# Patient Record
Sex: FEMALE | Race: ASIAN | NOT HISPANIC OR LATINO | URBAN - METROPOLITAN AREA
[De-identification: names, ages, dates, MRNs, and addresses within clinical notes are randomized per-mention and may not be internally consistent; named-entity substitution may affect disease eponyms.]

---

## 2021-05-13 PROBLEM — Z00.00 ENCOUNTER FOR PREVENTIVE HEALTH EXAMINATION: Status: ACTIVE | Noted: 2021-05-13

## 2022-12-24 ENCOUNTER — INPATIENT (INPATIENT)
Facility: HOSPITAL | Age: 87
LOS: 11 days | Discharge: NURSING, REHAB OR LONG-TERM CARE | DRG: 853 | End: 2023-01-05
Attending: INTERNAL MEDICINE | Admitting: INTERNAL MEDICINE
Payer: MEDICARE

## 2022-12-24 VITALS
WEIGHT: 89.95 LBS | HEART RATE: 123 BPM | SYSTOLIC BLOOD PRESSURE: 144 MMHG | OXYGEN SATURATION: 93 % | TEMPERATURE: 98 F | DIASTOLIC BLOOD PRESSURE: 90 MMHG | RESPIRATION RATE: 18 BRPM

## 2022-12-24 DIAGNOSIS — I80.10 PHLEBITIS AND THROMBOPHLEBITIS OF UNSPECIFIED FEMORAL VEIN: ICD-10-CM

## 2022-12-24 DIAGNOSIS — Z90.49 ACQUIRED ABSENCE OF OTHER SPECIFIED PARTS OF DIGESTIVE TRACT: Chronic | ICD-10-CM

## 2022-12-24 DIAGNOSIS — I26.99 OTHER PULMONARY EMBOLISM WITHOUT ACUTE COR PULMONALE: ICD-10-CM

## 2022-12-24 LAB
ALBUMIN SERPL ELPH-MCNC: 1.2 G/DL — LOW (ref 3.3–5)
ALBUMIN SERPL ELPH-MCNC: 1.5 G/DL — LOW (ref 3.3–5)
ALP SERPL-CCNC: 137 U/L — HIGH (ref 40–120)
ALP SERPL-CCNC: 149 U/L — HIGH (ref 40–120)
ALT FLD-CCNC: 55 U/L — HIGH (ref 10–45)
ALT FLD-CCNC: 59 U/L — HIGH (ref 10–45)
ANION GAP SERPL CALC-SCNC: 11 MMOL/L — SIGNIFICANT CHANGE UP (ref 5–17)
ANION GAP SERPL CALC-SCNC: 7 MMOL/L — SIGNIFICANT CHANGE UP (ref 5–17)
APPEARANCE UR: CLEAR — SIGNIFICANT CHANGE UP
APTT BLD: 30.2 SEC — SIGNIFICANT CHANGE UP (ref 27.5–35.5)
APTT BLD: >200 SEC — CRITICAL HIGH (ref 27.5–35.5)
AST SERPL-CCNC: 63 U/L — HIGH (ref 10–40)
AST SERPL-CCNC: 71 U/L — HIGH (ref 10–40)
BACTERIA # UR AUTO: NEGATIVE /HPF — SIGNIFICANT CHANGE UP
BASE EXCESS BLDA CALC-SCNC: -2.7 MMOL/L — LOW (ref -2–3)
BASOPHILS # BLD AUTO: 0.05 K/UL — SIGNIFICANT CHANGE UP (ref 0–0.2)
BASOPHILS NFR BLD AUTO: 0.4 % — SIGNIFICANT CHANGE UP (ref 0–2)
BILIRUB SERPL-MCNC: 0.6 MG/DL — SIGNIFICANT CHANGE UP (ref 0.2–1.2)
BILIRUB SERPL-MCNC: 0.7 MG/DL — SIGNIFICANT CHANGE UP (ref 0.2–1.2)
BILIRUB UR-MCNC: ABNORMAL
BUN SERPL-MCNC: 25 MG/DL — HIGH (ref 7–23)
BUN SERPL-MCNC: 27 MG/DL — HIGH (ref 7–23)
CALCIUM SERPL-MCNC: 7.5 MG/DL — LOW (ref 8.4–10.5)
CALCIUM SERPL-MCNC: 8.2 MG/DL — LOW (ref 8.4–10.5)
CHLORIDE SERPL-SCNC: 103 MMOL/L — SIGNIFICANT CHANGE UP (ref 96–108)
CHLORIDE SERPL-SCNC: 105 MMOL/L — SIGNIFICANT CHANGE UP (ref 96–108)
CK MB BLD-MCNC: 2.4 % — SIGNIFICANT CHANGE UP (ref 0–3.5)
CK MB CFR SERPL CALC: 5.3 NG/ML — SIGNIFICANT CHANGE UP (ref 0.5–10)
CK SERPL-CCNC: 218 U/L — HIGH (ref 25–170)
CO2 BLDA-SCNC: 21 MMOL/L — SIGNIFICANT CHANGE UP (ref 19–24)
CO2 SERPL-SCNC: 23 MMOL/L — SIGNIFICANT CHANGE UP (ref 22–31)
CO2 SERPL-SCNC: 24 MMOL/L — SIGNIFICANT CHANGE UP (ref 22–31)
COLOR SPEC: YELLOW — SIGNIFICANT CHANGE UP
CREAT SERPL-MCNC: 0.76 MG/DL — SIGNIFICANT CHANGE UP (ref 0.5–1.3)
CREAT SERPL-MCNC: 1.03 MG/DL — SIGNIFICANT CHANGE UP (ref 0.5–1.3)
DIFF PNL FLD: ABNORMAL
EGFR: 53 ML/MIN/1.73M2 — LOW
EGFR: 76 ML/MIN/1.73M2 — SIGNIFICANT CHANGE UP
EOSINOPHIL # BLD AUTO: 0.02 K/UL — SIGNIFICANT CHANGE UP (ref 0–0.5)
EOSINOPHIL NFR BLD AUTO: 0.2 % — SIGNIFICANT CHANGE UP (ref 0–6)
EPI CELLS # UR: SIGNIFICANT CHANGE UP
GAS PNL BLDA: SIGNIFICANT CHANGE UP
GLUCOSE SERPL-MCNC: 216 MG/DL — HIGH (ref 70–99)
GLUCOSE SERPL-MCNC: 81 MG/DL — SIGNIFICANT CHANGE UP (ref 70–99)
GLUCOSE UR QL: NEGATIVE — SIGNIFICANT CHANGE UP
HCO3 BLDA-SCNC: 21 MMOL/L — SIGNIFICANT CHANGE UP (ref 21–28)
HCT VFR BLD CALC: 28.4 % — LOW (ref 34.5–45)
HCT VFR BLD CALC: 36.8 % — SIGNIFICANT CHANGE UP (ref 34.5–45)
HGB BLD-MCNC: 11.6 G/DL — SIGNIFICANT CHANGE UP (ref 11.5–15.5)
HGB BLD-MCNC: 9.3 G/DL — LOW (ref 11.5–15.5)
HOROWITZ INDEX BLDA+IHG-RTO: 100 — SIGNIFICANT CHANGE UP
IMM GRANULOCYTES NFR BLD AUTO: 0.9 % — SIGNIFICANT CHANGE UP (ref 0–0.9)
INR BLD: 1.34 RATIO — HIGH (ref 0.88–1.16)
INR BLD: 1.85 RATIO — HIGH (ref 0.88–1.16)
KETONES UR-MCNC: NEGATIVE — SIGNIFICANT CHANGE UP
LACTATE SERPL-SCNC: 2.3 MMOL/L — HIGH (ref 0.7–2)
LACTATE SERPL-SCNC: 4.2 MMOL/L — CRITICAL HIGH (ref 0.7–2)
LACTATE SERPL-SCNC: 7.5 MMOL/L — CRITICAL HIGH (ref 0.7–2)
LEUKOCYTE ESTERASE UR-ACNC: ABNORMAL
LYMPHOCYTES # BLD AUTO: 1.29 K/UL — SIGNIFICANT CHANGE UP (ref 1–3.3)
LYMPHOCYTES # BLD AUTO: 10.4 % — LOW (ref 13–44)
MCHC RBC-ENTMCNC: 28.1 PG — SIGNIFICANT CHANGE UP (ref 27–34)
MCHC RBC-ENTMCNC: 28.4 PG — SIGNIFICANT CHANGE UP (ref 27–34)
MCHC RBC-ENTMCNC: 31.5 GM/DL — LOW (ref 32–36)
MCHC RBC-ENTMCNC: 32.7 GM/DL — SIGNIFICANT CHANGE UP (ref 32–36)
MCV RBC AUTO: 86.6 FL — SIGNIFICANT CHANGE UP (ref 80–100)
MCV RBC AUTO: 89.1 FL — SIGNIFICANT CHANGE UP (ref 80–100)
MONOCYTES # BLD AUTO: 0.37 K/UL — SIGNIFICANT CHANGE UP (ref 0–0.9)
MONOCYTES NFR BLD AUTO: 3 % — SIGNIFICANT CHANGE UP (ref 2–14)
NEUTROPHILS # BLD AUTO: 10.6 K/UL — HIGH (ref 1.8–7.4)
NEUTROPHILS NFR BLD AUTO: 85.1 % — HIGH (ref 43–77)
NITRITE UR-MCNC: NEGATIVE — SIGNIFICANT CHANGE UP
NRBC # BLD: 0 /100 WBCS — SIGNIFICANT CHANGE UP (ref 0–0)
NRBC # BLD: 0 /100 WBCS — SIGNIFICANT CHANGE UP (ref 0–0)
NT-PROBNP SERPL-SCNC: 8237 PG/ML — HIGH (ref 0–300)
PCO2 BLDA: 27 MMHG — LOW (ref 32–35)
PH BLDA: 7.49 — HIGH (ref 7.35–7.45)
PH UR: 6 — SIGNIFICANT CHANGE UP (ref 5–8)
PLATELET # BLD AUTO: 175 K/UL — SIGNIFICANT CHANGE UP (ref 150–400)
PLATELET # BLD AUTO: 241 K/UL — SIGNIFICANT CHANGE UP (ref 150–400)
PO2 BLDA: 289 MMHG — HIGH (ref 83–108)
POTASSIUM SERPL-MCNC: 4.9 MMOL/L — SIGNIFICANT CHANGE UP (ref 3.5–5.3)
POTASSIUM SERPL-MCNC: 5.5 MMOL/L — HIGH (ref 3.5–5.3)
POTASSIUM SERPL-SCNC: 4.9 MMOL/L — SIGNIFICANT CHANGE UP (ref 3.5–5.3)
POTASSIUM SERPL-SCNC: 5.5 MMOL/L — HIGH (ref 3.5–5.3)
PROT SERPL-MCNC: 4.6 G/DL — LOW (ref 6–8.3)
PROT SERPL-MCNC: 5.6 G/DL — LOW (ref 6–8.3)
PROT UR-MCNC: 30 MG/DL
PROTHROM AB SERPL-ACNC: 15.6 SEC — HIGH (ref 10.5–13.4)
PROTHROM AB SERPL-ACNC: 21.6 SEC — HIGH (ref 10.5–13.4)
RAPID RVP RESULT: SIGNIFICANT CHANGE UP
RBC # BLD: 3.28 M/UL — LOW (ref 3.8–5.2)
RBC # BLD: 4.13 M/UL — SIGNIFICANT CHANGE UP (ref 3.8–5.2)
RBC # FLD: 13.2 % — SIGNIFICANT CHANGE UP (ref 10.3–14.5)
RBC # FLD: 13.2 % — SIGNIFICANT CHANGE UP (ref 10.3–14.5)
RBC CASTS # UR COMP ASSIST: ABNORMAL /HPF (ref 0–4)
SAO2 % BLDA: 99.2 % — HIGH (ref 94–98)
SARS-COV-2 RNA SPEC QL NAA+PROBE: SIGNIFICANT CHANGE UP
SODIUM SERPL-SCNC: 136 MMOL/L — SIGNIFICANT CHANGE UP (ref 135–145)
SODIUM SERPL-SCNC: 137 MMOL/L — SIGNIFICANT CHANGE UP (ref 135–145)
SP GR SPEC: 1.01 — SIGNIFICANT CHANGE UP (ref 1.01–1.02)
TROPONIN I, HIGH SENSITIVITY RESULT: 140.4 NG/L — HIGH
TROPONIN I, HIGH SENSITIVITY RESULT: 170.9 NG/L — HIGH
UROBILINOGEN FLD QL: 4
WBC # BLD: 12.44 K/UL — HIGH (ref 3.8–10.5)
WBC # BLD: 15.43 K/UL — HIGH (ref 3.8–10.5)
WBC # FLD AUTO: 12.44 K/UL — HIGH (ref 3.8–10.5)
WBC # FLD AUTO: 15.43 K/UL — HIGH (ref 3.8–10.5)
WBC UR QL: SIGNIFICANT CHANGE UP /HPF (ref 0–5)

## 2022-12-24 PROCEDURE — 74177 CT ABD & PELVIS W/CONTRAST: CPT | Mod: 26,MG

## 2022-12-24 PROCEDURE — 93010 ELECTROCARDIOGRAM REPORT: CPT

## 2022-12-24 PROCEDURE — 99221 1ST HOSP IP/OBS SF/LOW 40: CPT

## 2022-12-24 PROCEDURE — 71045 X-RAY EXAM CHEST 1 VIEW: CPT | Mod: 26

## 2022-12-24 PROCEDURE — G1004: CPT

## 2022-12-24 PROCEDURE — 71260 CT THORAX DX C+: CPT | Mod: 26,MG

## 2022-12-24 PROCEDURE — 99291 CRITICAL CARE FIRST HOUR: CPT | Mod: FS

## 2022-12-24 RX ORDER — SODIUM CHLORIDE 9 MG/ML
1250 INJECTION INTRAMUSCULAR; INTRAVENOUS; SUBCUTANEOUS ONCE
Refills: 0 | Status: COMPLETED | OUTPATIENT
Start: 2022-12-24 | End: 2022-12-24

## 2022-12-24 RX ORDER — PIPERACILLIN AND TAZOBACTAM 4; .5 G/20ML; G/20ML
3.38 INJECTION, POWDER, LYOPHILIZED, FOR SOLUTION INTRAVENOUS ONCE
Refills: 0 | Status: COMPLETED | OUTPATIENT
Start: 2022-12-24 | End: 2022-12-24

## 2022-12-24 RX ORDER — SODIUM CHLORIDE 9 MG/ML
1000 INJECTION, SOLUTION INTRAVENOUS
Refills: 0 | Status: DISCONTINUED | OUTPATIENT
Start: 2022-12-24 | End: 2022-12-26

## 2022-12-24 RX ORDER — ACETAMINOPHEN 500 MG
650 TABLET ORAL EVERY 6 HOURS
Refills: 0 | Status: DISCONTINUED | OUTPATIENT
Start: 2022-12-24 | End: 2022-12-26

## 2022-12-24 RX ORDER — VANCOMYCIN HCL 1 G
1000 VIAL (EA) INTRAVENOUS ONCE
Refills: 0 | Status: COMPLETED | OUTPATIENT
Start: 2022-12-24 | End: 2022-12-24

## 2022-12-24 RX ORDER — VANCOMYCIN HCL 1 G
1000 VIAL (EA) INTRAVENOUS EVERY 12 HOURS
Refills: 0 | Status: DISCONTINUED | OUTPATIENT
Start: 2022-12-24 | End: 2022-12-25

## 2022-12-24 RX ORDER — MIDODRINE HYDROCHLORIDE 2.5 MG/1
5 TABLET ORAL EVERY 8 HOURS
Refills: 0 | Status: DISCONTINUED | OUTPATIENT
Start: 2022-12-24 | End: 2022-12-26

## 2022-12-24 RX ORDER — MIDODRINE HYDROCHLORIDE 2.5 MG/1
15 TABLET ORAL EVERY 8 HOURS
Refills: 0 | Status: DISCONTINUED | OUTPATIENT
Start: 2022-12-24 | End: 2022-12-24

## 2022-12-24 RX ORDER — HEPARIN SODIUM 5000 [USP'U]/ML
INJECTION INTRAVENOUS; SUBCUTANEOUS
Qty: 25000 | Refills: 0 | Status: DISCONTINUED | OUTPATIENT
Start: 2022-12-24 | End: 2022-12-25

## 2022-12-24 RX ORDER — PIPERACILLIN AND TAZOBACTAM 4; .5 G/20ML; G/20ML
3.38 INJECTION, POWDER, LYOPHILIZED, FOR SOLUTION INTRAVENOUS EVERY 8 HOURS
Refills: 0 | Status: DISCONTINUED | OUTPATIENT
Start: 2022-12-24 | End: 2022-12-26

## 2022-12-24 RX ORDER — PANTOPRAZOLE SODIUM 20 MG/1
40 TABLET, DELAYED RELEASE ORAL DAILY
Refills: 0 | Status: DISCONTINUED | OUTPATIENT
Start: 2022-12-24 | End: 2022-12-26

## 2022-12-24 RX ORDER — PIPERACILLIN AND TAZOBACTAM 4; .5 G/20ML; G/20ML
3.38 INJECTION, POWDER, LYOPHILIZED, FOR SOLUTION INTRAVENOUS ONCE
Refills: 0 | Status: DISCONTINUED | OUTPATIENT
Start: 2022-12-24 | End: 2022-12-24

## 2022-12-24 RX ORDER — HEPARIN SODIUM 5000 [USP'U]/ML
3500 INJECTION INTRAVENOUS; SUBCUTANEOUS ONCE
Refills: 0 | Status: COMPLETED | OUTPATIENT
Start: 2022-12-24 | End: 2022-12-24

## 2022-12-24 RX ADMIN — PIPERACILLIN AND TAZOBACTAM 200 GRAM(S): 4; .5 INJECTION, POWDER, LYOPHILIZED, FOR SOLUTION INTRAVENOUS at 16:47

## 2022-12-24 RX ADMIN — SODIUM CHLORIDE 100 MILLILITER(S): 9 INJECTION, SOLUTION INTRAVENOUS at 16:47

## 2022-12-24 RX ADMIN — Medication 0.5 MILLIGRAM(S): at 12:30

## 2022-12-24 RX ADMIN — Medication 250 MILLIGRAM(S): at 12:07

## 2022-12-24 RX ADMIN — HEPARIN SODIUM 800 UNIT(S)/HR: 5000 INJECTION INTRAVENOUS; SUBCUTANEOUS at 15:22

## 2022-12-24 RX ADMIN — SODIUM CHLORIDE 1250 MILLILITER(S): 9 INJECTION INTRAMUSCULAR; INTRAVENOUS; SUBCUTANEOUS at 12:40

## 2022-12-24 RX ADMIN — Medication 1000 MILLIGRAM(S): at 14:11

## 2022-12-24 RX ADMIN — SODIUM CHLORIDE 1250 MILLILITER(S): 9 INJECTION INTRAMUSCULAR; INTRAVENOUS; SUBCUTANEOUS at 14:15

## 2022-12-24 RX ADMIN — MIDODRINE HYDROCHLORIDE 5 MILLIGRAM(S): 2.5 TABLET ORAL at 22:03

## 2022-12-24 RX ADMIN — HEPARIN SODIUM 3500 UNIT(S): 5000 INJECTION INTRAVENOUS; SUBCUTANEOUS at 15:20

## 2022-12-24 RX ADMIN — PIPERACILLIN AND TAZOBACTAM 25 GRAM(S): 4; .5 INJECTION, POWDER, LYOPHILIZED, FOR SOLUTION INTRAVENOUS at 22:30

## 2022-12-24 RX ADMIN — Medication 100 MILLIGRAM(S): at 21:16

## 2022-12-24 RX ADMIN — HEPARIN SODIUM 700 UNIT(S)/HR: 5000 INJECTION INTRAVENOUS; SUBCUTANEOUS at 19:29

## 2022-12-24 RX ADMIN — HEPARIN SODIUM 0 UNIT(S)/HR: 5000 INJECTION INTRAVENOUS; SUBCUTANEOUS at 18:30

## 2022-12-24 NOTE — ED PROVIDER NOTE - OBJECTIVE STATEMENT
daughter at bedside translating for patient   87-year-old female brought in by EMS to the ED by her daughter for a decubitus ulcer.  Patient was also short of breath per EMS.  Per patient's daughter she had a small ulcer chronically which they were doing local wound care for, however she reports she has not seen the ulcer in 2 weeks, when she looked at it today she saw that it was large and very deep.  Patient is bedbound and does not ambulate.  No fever chills no chest pain no nausea vomiting. daughter at bedside translating for patient   87-year-old female brought in by EMS to the ED by her daughter for a decubitus ulcer.  Patient was also short of breath per EMS.  Per patient's daughter she had a small ulcer chronically which they were doing local wound care for, however she reports she has not seen the ulcer in 2 weeks, when she looked at it today she saw that it was large and very deep.  Patient is bedbound and does not ambulate.  No fever chills no chest pain no nausea vomiting. pt cannot provide history

## 2022-12-24 NOTE — ED PROVIDER NOTE - CLINICAL SUMMARY MEDICAL DECISION MAKING FREE TEXT BOX
87-year-old female brought in by EMS to the ED by her daughter for a decubitus ulcer.  Patient was also short of breath per EMS.  Per patient's daughter she had a small ulcer chronically which they were doing local wound care for, however she reports she has not seen the ulcer in 2 weeks, when she looked at it today she saw that it was large and very deep.  Patient is bedbound and does not ambulate.  No fever chills no chest pain no nausea vomiting. PE as noted above. ED sepsis w/u initiated. 87-year-old female brought in by EMS to the ED by her daughter for a decubitus ulcer.  Patient was also short of breath per EMS.  Per patient's daughter she had a small ulcer chronically which they were doing local wound care for, however she reports she has not seen the ulcer in 2 weeks, when she looked at it today she saw that it was large and very deep.  Patient is bedbound and does not ambulate.  No fever chills no chest pain no nausea vomiting. PE as noted above. ED sepsis w/u initiated.    4pm: lab abnormalities reviewed. CT results reviewed- b/l PE and right fem DVT.  Gluteal abscess noted and air droplets around the sacral decubitus. Surgery and ICU c/s. Pt accepted to [facility].  Heparin started. daughter at bedside translating for patient   87-year-old female brought in by EMS to the ED by her daughter for a decubitus ulcer.  Patient was also short of breath per EMS.  Per patient's daughter she had a small ulcer chronically which they were doing local wound care for, however she reports she has not seen the ulcer in 2 weeks, when she looked at it today she saw that it was large and very deep.  Patient is bedbound and does not ambulate.  No fever chills no chest pain no nausea vomiting. pt cannot provide history  labs imaging re-assess    4pm: lab abnormalities reviewed. CT results reviewed- b/l PE and right fem DVT.  Gluteal abscess noted and air droplets around the sacral decubitus. Surgery and ICU c/s. Pt accepted to .  Heparin started.

## 2022-12-24 NOTE — ED ADULT NURSE NOTE - OBJECTIVE STATEMENT
Patient presents to ED after daughter called ambulance for worsening pressure ulcer on sacrum. Patient daughter states she last saw the ulcer 2 weeks ago Patient presents to ED after daughter called ambulance for worsening pressure ulcer on sacrum. Patient daughter states she last saw the ulcer 2 weeks ago and then when seeing it today realized it was very bad. Patient has home health aide but aide never mentioned to family worsening condition of the ulcer. Patient agitated and pulling at clothes. Patient's daughter states she Patient presents to ED after daughter called ambulance for worsening pressure ulcer on sacrum. Patient daughter states she last saw the ulcer 2 weeks ago and then when seeing it today realized it was very bad. Patient has home health aide but aide never mentioned to family worsening condition of the ulcer. Patient agitated and pulling at clothes. Patient's daughter states she has not seen a doctor in many years.

## 2022-12-24 NOTE — H&P ADULT - NSHPPHYSICALEXAM_GEN_ALL_CORE
Vital Signs Last 24 Hrs  T(C): 36.7 (24 Dec 2022 11:55), Max: 36.7 (24 Dec 2022 11:55)  T(F): 98.1 (24 Dec 2022 11:55), Max: 98.1 (24 Dec 2022 11:55)  HR: 110 (24 Dec 2022 14:46) (110 - 123)  BP: 101/57 (24 Dec 2022 14:46) (80/45 - 144/90)  BP(mean): 93 (24 Dec 2022 11:55) (93 - 93)  RR: 31 (24 Dec 2022 14:46) (18 - 31)  SpO2: 95% (24 Dec 2022 14:46) (93% - 95%)    Physical Exam  Gen:  WN/WD Female tachypneic in bed, on nasal cannula oxygen. Muttering words in chinese  ENT:  NC/AT, no JVD noted  Thorax:  Symmetric, no retractions  Lung:  CTA b/l  CV:  S1, S2. RRR  Abd:  Soft, NT/ND.  BS normoactive, no masses to palp  Extrem:  2 + edema b/l, no C/C, DP/radial pulses +2 Vital Signs Last 24 Hrs  T(C): 36.7 (24 Dec 2022 11:55), Max: 36.7 (24 Dec 2022 11:55)  T(F): 98.1 (24 Dec 2022 11:55), Max: 98.1 (24 Dec 2022 11:55)  HR: 110 (24 Dec 2022 14:46) (110 - 123)  BP: 101/57 (24 Dec 2022 14:46) (80/45 - 144/90)  BP(mean): 93 (24 Dec 2022 11:55) (93 - 93)  RR: 31 (24 Dec 2022 14:46) (18 - 31)  SpO2: 95% (24 Dec 2022 14:46) (93% - 95%)    Physical Exam  Gen:  WN/WD Female tachypneic in bed, on nasal cannula oxygen.   ENT:  NC/AT, no JVD noted  Thorax:  Symmetric, no retractions  Lung:  CTA b/l no rales , rhonchi  CV:  S1, S2. RRR  Abd:  Soft, NT/ND.  BS normoactive, no masses to palp  Extrem:  2 + edema b/l, no C/C, DP/radial pulses +2  Skin - Large stg4 sacral decub

## 2022-12-24 NOTE — ED ADULT NURSE NOTE - CHPI ED NUR SYMPTOMS NEG
no chills/no decreased eating/drinking no chills/no decreased eating/drinking/no dizziness/no fever/no nausea/no tingling/no vomiting/no weakness

## 2022-12-24 NOTE — H&P ADULT - NSHPLABSRESULTS_GEN_ALL_CORE
Labs                        11.6   12.44 )-----------( 241      ( 24 Dec 2022 11:34 )             36.8       12-24    137  |  103  |  27<H>  ----------------------------<  216<H>  5.5<H>   |  23  |  1.03    Ca    8.2<L>      24 Dec 2022 11:34    TPro  5.6<L>  /  Alb  1.5<L>  /  TBili  0.7  /  DBili  x   /  AST  63<H>  /  ALT  59<H>  /  AlkPhos  149<H>  12-24        Lactic Acid Trend  12-24-22 @ 12:50   -   4.2<HH>  12-24-22 @ 11:34   -   7.5<HH>

## 2022-12-24 NOTE — ED PROVIDER NOTE - CARE PLAN
1 Principal Discharge DX:	Pulmonary embolism  Secondary Diagnosis:	DVT, lower extremity  Secondary Diagnosis:	Gluteal abscess  Secondary Diagnosis:	Sacral decubitus ulcer

## 2022-12-24 NOTE — ED PROVIDER NOTE - PHYSICAL EXAMINATION
Gen: Well appearing in NAD.  AAOx3  Head: atraumatic  Heart: s1/s2, RRR  Lung: CTA b/l, no wheezing/rhonchi or rales. SPo2 was 90% on RA  Abd: soft, NT/ND, no rebound or guarding, NCVAT  Msk: 3+ pedal edema b/l   Neuro: patient moving all extremity equally  Skin: large stage 4 decubitus ulcer.   Psych: Alert and oriented Gen: Well appearing in NAD.  AAOx3  Head: atraumatic  HEENT: dry po  Heart: s1/s2, RRR  Lung: slightly coarse at bases b/l, no wheezing/rhonchi or rales. SPo2 was 90% on RA  Abd: soft, NT/ND, no rebound or guarding, NCVAT  Msk: 3+ pedal edema b/l   Neuro: patient moving all extremity equally  Skin: large stage 4 decubitus ulcer, bone exposed, scant pus discharge.   Psych: awake, not orieinted (bseline)

## 2022-12-24 NOTE — ED PROVIDER NOTE - NS_BEDUNITTYPES_ED_ALL_ED
Topical Retinoid counseling:  Patient advised to apply a pea-sized amount only at bedtime and wait 30 minutes after washing their face before applying.  If too drying, patient may add a non-comedogenic moisturizer. The patient verbalized understanding of the proper use and possible adverse effects of retinoids.  All of the patient's questions and concerns were addressed. Minocycline Pregnancy And Lactation Text: This medication is Pregnancy Category D and not consider safe during pregnancy. It is also excreted in breast milk. Doxycycline Counseling:  Patient counseled regarding possible photosensitivity and increased risk for sunburn.  Patient instructed to avoid sunlight, if possible.  When exposed to sunlight, patients should wear protective clothing, sunglasses, and sunscreen.  The patient was instructed to call the office immediately if the following severe adverse effects occur:  hearing changes, easy bruising/bleeding, severe headache, or vision changes.  The patient verbalized understanding of the proper use and possible adverse effects of doxycycline.  All of the patient's questions and concerns were addressed. Tetracycline Counseling: Patient counseled regarding possible photosensitivity and increased risk for sunburn.  Patient instructed to avoid sunlight, if possible.  When exposed to sunlight, patients should wear protective clothing, sunglasses, and sunscreen.  The patient was instructed to call the office immediately if the following severe adverse effects occur:  hearing changes, easy bruising/bleeding, severe headache, or vision changes.  The patient verbalized understanding of the proper use and possible adverse effects of tetracycline.  All of the patient's questions and concerns were addressed. Patient understands to avoid pregnancy while on therapy due to potential birth defects. Use Enhanced Medication Counseling?: No Isotretinoin Pregnancy And Lactation Text: This medication is Pregnancy Category X and is considered extremely dangerous during pregnancy. It is unknown if it is excreted in breast milk. Topical Clindamycin Pregnancy And Lactation Text: This medication is Pregnancy Category B and is considered safe during pregnancy. It is unknown if it is excreted in breast milk. Birth Control Pills Counseling: Birth Control Pill Counseling: I discussed with the patient the potential side effects of OCPs including but not limited to increased risk of stroke, heart attack, thrombophlebitis, deep venous thrombosis, hepatic adenomas, breast changes, GI upset, headaches, and depression.  The patient verbalized understanding of the proper use and possible adverse effects of OCPs. All of the patient's questions and concerns were addressed. Topical Retinoid Pregnancy And Lactation Text: This medication is Pregnancy Category C. It is unknown if this medication is excreted in breast milk. Sarecycline Counseling: Patient advised regarding possible photosensitivity and discoloration of the teeth, skin, lips, tongue and gums.  Patient instructed to avoid sunlight, if possible.  When exposed to sunlight, patients should wear protective clothing, sunglasses, and sunscreen.  The patient was instructed to call the office immediately if the following severe adverse effects occur:  hearing changes, easy bruising/bleeding, severe headache, or vision changes.  The patient verbalized understanding of the proper use and possible adverse effects of sarecycline.  All of the patient's questions and concerns were addressed. Doxycycline Pregnancy And Lactation Text: This medication is Pregnancy Category D and not consider safe during pregnancy. It is also excreted in breast milk but is considered safe for shorter treatment courses. Azithromycin Counseling:  I discussed with the patient the risks of azithromycin including but not limited to GI upset, allergic reaction, drug rash, diarrhea, and yeast infections. Topical Sulfur Applications Counseling: Topical Sulfur Counseling: Patient counseled that this medication may cause skin irritation or allergic reactions.  In the event of skin irritation, the patient was advised to reduce the amount of the drug applied or use it less frequently.   The patient verbalized understanding of the proper use and possible adverse effects of topical sulfur application.  All of the patient's questions and concerns were addressed. Birth Control Pills Pregnancy And Lactation Text: This medication should be avoided if pregnant and for the first 30 days post-partum. High Dose Vitamin A Counseling: Side effects reviewed, pt to contact office should one occur. Tazorac Counseling:  Patient advised that medication is irritating and drying.  Patient may need to apply sparingly and wash off after an hour before eventually leaving it on overnight.  The patient verbalized understanding of the proper use and possible adverse effects of tazorac.  All of the patient's questions and concerns were addressed. Erythromycin Counseling:  I discussed with the patient the risks of erythromycin including but not limited to GI upset, allergic reaction, drug rash, diarrhea, increase in liver enzymes, and yeast infections. Azithromycin Pregnancy And Lactation Text: This medication is considered safe during pregnancy and is also secreted in breast milk. Topical Sulfur Applications Pregnancy And Lactation Text: This medication is Pregnancy Category C and has an unknown safety profile during pregnancy. It is unknown if this topical medication is excreted in breast milk. Benzoyl Peroxide Counseling: Patient counseled that medicine may cause skin irritation and bleach clothing.  In the event of skin irritation, the patient was advised to reduce the amount of the drug applied or use it less frequently.   The patient verbalized understanding of the proper use and possible adverse effects of benzoyl peroxide.  All of the patient's questions and concerns were addressed. Tazorac Pregnancy And Lactation Text: This medication is not safe during pregnancy. It is unknown if this medication is excreted in breast milk. High Dose Vitamin A Pregnancy And Lactation Text: High dose vitamin A therapy is contraindicated during pregnancy and breast feeding. Dapsone Counseling: I discussed with the patient the risks of dapsone including but not limited to hemolytic anemia, agranulocytosis, rashes, methemoglobinemia, kidney failure, peripheral neuropathy, headaches, GI upset, and liver toxicity.  Patients who start dapsone require monitoring including baseline LFTs and weekly CBCs for the first month, then every month thereafter.  The patient verbalized understanding of the proper use and possible adverse effects of dapsone.  All of the patient's questions and concerns were addressed. Spironolactone Counseling: Patient advised regarding risks of diarrhea, abdominal pain, hyperkalemia, birth defects (for female patients), liver toxicity and renal toxicity. The patient may need blood work to monitor liver and kidney function and potassium levels while on therapy. The patient verbalized understanding of the proper use and possible adverse effects of spironolactone.  All of the patient's questions and concerns were addressed. Erythromycin Pregnancy And Lactation Text: This medication is Pregnancy Category B and is considered safe during pregnancy. It is also excreted in breast milk. Bactrim Counseling:  I discussed with the patient the risks of sulfa antibiotics including but not limited to GI upset, allergic reaction, drug rash, diarrhea, dizziness, photosensitivity, and yeast infections.  Rarely, more serious reactions can occur including but not limited to aplastic anemia, agranulocytosis, methemoglobinemia, blood dyscrasias, liver or kidney failure, lung infiltrates or desquamative/blistering drug rashes. Detail Level: Simple Benzoyl Peroxide Pregnancy And Lactation Text: This medication is Pregnancy Category C. It is unknown if benzoyl peroxide is excreted in breast milk. ICU Topical Clindamycin Counseling: Patient counseled that this medication may cause skin irritation or allergic reactions.  In the event of skin irritation, the patient was advised to reduce the amount of the drug applied or use it less frequently.   The patient verbalized understanding of the proper use and possible adverse effects of clindamycin.  All of the patient's questions and concerns were addressed. Minocycline Counseling: Patient advised regarding possible photosensitivity and discoloration of the teeth, skin, lips, tongue and gums.  Patient instructed to avoid sunlight, if possible.  When exposed to sunlight, patients should wear protective clothing, sunglasses, and sunscreen.  The patient was instructed to call the office immediately if the following severe adverse effects occur:  hearing changes, easy bruising/bleeding, severe headache, or vision changes.  The patient verbalized understanding of the proper use and possible adverse effects of minocycline.  All of the patient's questions and concerns were addressed. Spironolactone Pregnancy And Lactation Text: This medication can cause feminization of the male fetus and should be avoided during pregnancy. The active metabolite is also found in breast milk. Dapsone Pregnancy And Lactation Text: This medication is Pregnancy Category C and is not considered safe during pregnancy or breast feeding. Isotretinoin Counseling: Patient should get monthly blood tests, not donate blood, not drive at night if vision affected, not share medication, and not undergo elective surgery for 6 months after tx completed. Side effects reviewed, pt to contact office should one occur. Bactrim Pregnancy And Lactation Text: This medication is Pregnancy Category D and is known to cause fetal risk.  It is also excreted in breast milk. Winlevi Pregnancy And Lactation Text: This medication is considered safe during pregnancy and breastfeeding. Winlevi Counseling:  I discussed with the patient the risks of topical clascoterone including but not limited to erythema, scaling, itching, and stinging. Patient voiced their understanding.

## 2022-12-24 NOTE — H&P ADULT - CONVERSATION DETAILS
Case d/w Bruno  made patient DNR/I  discussed role in pressors and she did not want at this time, but stated will think about it  wants patient to be treated and kept comfortable  IVF, A/C, Antibiotics ok

## 2022-12-24 NOTE — ED ADULT NURSE REASSESSMENT NOTE - NS ED NURSE REASSESS COMMENT FT1
Patient is pulling at EKG leads, oxygen, and IV line. IV line covered with dominguez to prevent patient pulling line out. Unable to obtain accurate continuous cardiac monitoring due to patient movement and pulling at wires. Patient family at bedside unable to calm patient.

## 2022-12-24 NOTE — ED PROVIDER NOTE - CRITICAL CARE ATTENDING CONTRIBUTION TO CARE
dr lizama: daughter at bedside translating for patient   87-year-old female brought in by EMS to the ED by her daughter for a decubitus ulcer.  Patient was also short of breath per EMS.  Per patient's daughter she had a small ulcer chronically which they were doing local wound care for, however she reports she has not seen the ulcer in 2 weeks, when she looked at it today she saw that it was large and very deep.  Patient is bedbound and does not ambulate.  No fever chills no chest pain no nausea vomiting. pt cannot provide history  labs imaging re-assess    4pm: lab abnormalities reviewed. CT results reviewed- b/l PE and right fem DVT.  Gluteal abscess noted and air droplets around the sacral decubitus. Surgery and ICU c/s. Pt accepted to .  Heparin started.    I spent time providing direct patient care, discussion with family and other physicians, interpreting imaging and lab results and documentation

## 2022-12-24 NOTE — H&P ADULT - NS ATTEND AMEND GEN_ALL_CORE FT
pt seen and examined in ed  case d/w Surgery and id team  d/w dtr   want dnr/I, no pressors  ezra admit to AI,   Midodrine  IVF  IV antibiotics  ID eval to be called in am  tte  trend bnp/trop/lactic acid  palliative care eval.

## 2022-12-24 NOTE — H&P ADULT - HISTORY OF PRESENT ILLNESS
Patient is a 87y old  Female who presents with a chief complaint of sepsis (24 Dec 2022 15:38)      Source: Patient's daughter  Reliability: very good    CC: large sacral ulcer    HPI  This is an 87-year-old female brought in by EMS to the ED by her daughter for a decubitus ulcer.  Patient was also short of breath per EMS.  Per patient's daughter she had a small ulcer that began 6 months ago, which they were doing local wound care for, however she reports she has not seen the ulcer in 2 weeks, when she looked at it today she saw that it was large and very deep.  As per daughter, patient has been declining in health for the past year, last seen by pmd 18 months ago. Patient was ambulating with poor balance, became incontinent over 6 months ago. She developed and a sacral ulcer while wearing a diaper and being less mobile. She eventually became more sedentary, bedbound about  2 weeks ago. Sacral ulcer soon developed after that and was noticed by daughter as the size of a coin. Daughter noticed today the ulcer became very large. There was no reported fever, chills no chest pain no nausea vomiting.    In the ED, patient was evaluated for respiratory distress and lower back tenderness. She was initiated on sepsis protocol, IVF resuscitation, started on IV antibiotics, blood cultures and lactic acid levels were obtained. A CT angiogram of chest and CT of abdomen/ pelvis were performed. Interpretation was as followed:    IMPRESSION:  1. Bilateral pulmonary emboli. Thrombusis identified within the right   femoral vein as well as deep femoral vein consistent with DVT.  2. There is a marginally enhancing air/fluid collection, which may be   multiloculated measuring 4.5 x 2.0 cm within the left gluteal   musculature. Subcutaneous air is identified overlying the inferior sacrum   and medial right gluteal region.  3. Droplets of epidural air are identified at the level of the sacral   canal; the patient is status post laminectomy at L4-L5. Differential   considerations of the epidural air would include extension of the sacral   decubitus infection versus secondary to degenerative change.      Findings were discussed with Dr. GETACHEW MAYORGA [id number] 12/24/2022   2:44 PM by Dr. Odell with read back confirmation.    --- End of Report ---        ELENI ODELL MD; Attending Radiologist  This document has been electronically signed. Dec 24 2022  2:45PM    < end of copied text >    Critical care and general surgery were both consulted      PAST MEDICAL & SURGICAL HISTORY:  Colon cancer    Allergies    No Known Allergies        MEDICATIONS  (STANDING):  heparin  Infusion.  Unit(s)/Hr (8 mL/Hr) IV Continuous <Continuous>  lactated ringers. 1000 milliLiter(s) (100 mL/Hr) IV Continuous <Continuous>  piperacillin/tazobactam IVPB.- 3.375 Gram(s) IV Intermittent once  vancomycin  IVPB 1000 milliGRAM(s) IV Intermittent every 12 hours    MEDICATIONS  (PRN):  acetaminophen     Tablet .. 650 milliGRAM(s) Oral every 6 hours PRN Temp greater or equal to 38C (100.4F), Mild Pain (1 - 3)           Patient is a 87y old  Female who presents with a chief complaint of sepsis (24 Dec 2022 15:38)    Source: Patient's daughter  Reliability: very good    CC: large sacral ulcer    HPI  This is an 87-year-old female  with h/o Dementia, colon cancer s/p resection brought in by EMS to the ED by her daughter for a decubitus ulcer and increasing weakness.  Patient was also short of breath per EMS.  Per patient's daughter she had a small ulcer that began 6 months ago, which they were doing local wound care for, however she reports she has not seen the ulcer in 2 weeks, when she looked at it today she saw that it was large and very deep.  As per daughter, patient has been declining in health for the past year, last seen by pmd 18 months ago. Patient was ambulating with poor balance, became incontinent over 6 months ago. She developed and a sacral ulcer while wearing a diaper and being less mobile. She eventually became more sedentary, bedbound about  2 weeks ago. Sacral ulcer soon developed after that and was noticed by daughter as the size of a coin. Daughter noticed today the ulcer became very large. There was no reported fever, chills no chest pain no nausea vomiting.    In the ED, patient was evaluated for respiratory distress and lower back tenderness. She was initiated on sepsis protocol, IVF resuscitation, started on IV antibiotics, blood cultures and lactic acid levels were obtained. A CT angiogram of chest and CT of abdomen/ pelvis were performed.  where showed Gluteal Abcess, b/l PE, Fem DVT and sacral decub    Critical care and general surgery were both consulted  Surgical consult called and seen patient and not acute surgical candidate

## 2022-12-24 NOTE — H&P ADULT - ASSESSMENT
87-year-old female brought in by EMS to the ED by her daughter for a decubitus ulcer.  Patient was also short of breath per EMS.  Per patient's daughter she had a small ulcer that began 6 months ago, which they were doing local wound care for, however she reports she has not seen the ulcer in 2 weeks, when she looked at it today she saw that it was large and very deep.  As per daughter, patient has been declining in health for the past year, last seen by pmd 18 months ago. Patient was ambulating with poor balance, became incontinent over 6 months ago. She developed and a sacral ulcer while wearing a diaper and being less mobile. She eventually became more sedentary, bedbound about  2 weeks ago. Sacral ulcer soon developed after that and was noticed by daughter as the size of a coin. Daughter noticed today the ulcer became very large. There was no reported fever, chills no chest pain no nausea vomiting.    1. Large sacral decubiti with subsequent Septic shock  2. Right femoral vein DVT  3. Bilateral pulmonary emboli with probable obstructive shock  4. Impending respiratory failure  5. Elevated troponin levels (tropinitis),  6. Renal insufficiency vs. dehydration  7. Colon cancer    - Admit to acute illness service  - Continue heparin infusion  - Continue supplemental oxygen  - monitor oxygen saturation, BP, heart rate  - Continue antibiotics, IVF resuscitation -lactate ringers solution  - Monitor urine output, kidney function  - sacral decub / wound care as per surgery  - Family does not want vasopressors  - Repeat EKG, chest xray, ABG as needed  - Follow up cultures, lab, lactic acid levels  - GI and DVT prophylaxis  - Patient is at risk for sudden death, poor prognosis  - Case d/w patient's daughter at bedside  - Patient is DNR/DNI with [facility] in chart 87-year-old female brought in by EMS to the ED by her daughter for a decubitus ulcer.  Patient was also short of breath per EMS.  Per patient's daughter she had a small ulcer that began 6 months ago, which they were doing local wound care for, however she reports she has not seen the ulcer in 2 weeks, when she looked at it today she saw that it was large and very deep.  As per daughter, patient has been declining in health for the past year, last seen by pmd 18 months ago. Patient was ambulating with poor balance, became incontinent over 6 months ago. She developed and a sacral ulcer while wearing a diaper and being less mobile. She eventually became more sedentary, bedbound about  2 weeks ago. Sacral ulcer soon developed after that and was noticed by daughter as the size of a coin. Daughter noticed today the ulcer became very large. There was no reported fever, chills no chest pain no nausea vomiting.    1. Large sacral decubiti with subsequent Septic shock  2. Right femoral vein DVT  3. Bilateral pulmonary emboli with probable obstructive shock  4. Impending respiratory failure  5. Elevated troponin levels (tropinitis),  6. Colon cancer    - Admit to acute illness service  - Continue heparin infusion  - Continue supplemental oxygen  - monitor oxygen saturation, BP, heart rate  - Continue antibiotics, IVF resuscitation -lactate ringers solution  - Monitor urine output, kidney function  - sacral decub / wound care as per surgery  - Family does not want vasopressors  - Repeat EKG, chest xray, ABG as needed  - Follow up cultures, lab, lactic acid levels  - GI and DVT prophylaxis  - Patient is at risk for sudden death, poor prognosis  - Case d/w patient's daughter at bedside  - Patient is DNR/DNI with [facility] in chart 87-year-old female brought in by EMS to the ED by her daughter for a decubitus ulcer.  Patient was also short of breath per EMS.  Per patient's daughter she had a small ulcer that began 6 months ago, which they were doing local wound care for, however she reports she has not seen the ulcer in 2 weeks, when she looked at it today she saw that it was large and very deep.  As per daughter, patient has been declining in health for the past year, last seen by pmd 18 months ago. Patient was ambulating with poor balance, became incontinent over 6 months ago. She developed and a sacral ulcer while wearing a diaper and being less mobile. She eventually became more sedentary, bedbound about  2 weeks ago. Sacral ulcer soon developed after that and was noticed by daughter as the size of a coin. Daughter noticed today the ulcer became very large. There was no reported fever, chills no chest pain no nausea vomiting.    1. Septic shock due to Large sacral decubiti with with lactic acidosis  2. Right femoral vein DVT  3. Bilateral pulmonary emboli   3. Elevated troponin levels suspect from PE  6. Colon cancer    - Admit to acute illness service  - Continue heparin infusion  - Continue supplemental oxygen  - monitor oxygen saturation, BP, heart rate  - Continue antibiotics, IVF resuscitation -lactate ringers solution  - start Midodrine for borderline bp  - Monitor urine output, kidney function  - sacral decub / wound care as per surgery  - Family does not want vasopressors  - Repeat EKG, chest xray, ABG as needed, check Echo  - Follow up cultures, lab, lactic acid levels  - GI and DVT prophylaxis  - Patient is at risk for sudden death, poor prognosis  - Case d/w patient's daughter at bedside  - Patient is DNR/DNI with [facility] in chart

## 2022-12-25 ENCOUNTER — TRANSCRIPTION ENCOUNTER (OUTPATIENT)
Age: 87
End: 2022-12-25

## 2022-12-25 DIAGNOSIS — L98.429 NON-PRESSURE CHRONIC ULCER OF BACK WITH UNSPECIFIED SEVERITY: ICD-10-CM

## 2022-12-25 LAB
-  BACTEROIDES FRAGILIS: SIGNIFICANT CHANGE UP
-  BACTEROIDES FRAGILIS: SIGNIFICANT CHANGE UP
-  STREPTOCOCCUS SP. (NOT GRP A, B OR S PNEUMONIAE): SIGNIFICANT CHANGE UP
ALBUMIN SERPL ELPH-MCNC: 1.3 G/DL — LOW (ref 3.3–5)
ALP SERPL-CCNC: 142 U/L — HIGH (ref 40–120)
ALT FLD-CCNC: 33 U/L — SIGNIFICANT CHANGE UP (ref 10–45)
ANION GAP SERPL CALC-SCNC: 9 MMOL/L — SIGNIFICANT CHANGE UP (ref 5–17)
APTT BLD: 62.2 SEC — HIGH (ref 27.5–35.5)
APTT BLD: 74.9 SEC — HIGH (ref 27.5–35.5)
AST SERPL-CCNC: 41 U/L — HIGH (ref 10–40)
BASOPHILS # BLD AUTO: 0 K/UL — SIGNIFICANT CHANGE UP (ref 0–0.2)
BASOPHILS NFR BLD AUTO: 0 % — SIGNIFICANT CHANGE UP (ref 0–2)
BILIRUB SERPL-MCNC: 0.5 MG/DL — SIGNIFICANT CHANGE UP (ref 0.2–1.2)
BUN SERPL-MCNC: 22 MG/DL — SIGNIFICANT CHANGE UP (ref 7–23)
CALCIUM SERPL-MCNC: 7.4 MG/DL — LOW (ref 8.4–10.5)
CHLORIDE SERPL-SCNC: 102 MMOL/L — SIGNIFICANT CHANGE UP (ref 96–108)
CHOLEST SERPL-MCNC: 110 MG/DL — SIGNIFICANT CHANGE UP
CO2 SERPL-SCNC: 24 MMOL/L — SIGNIFICANT CHANGE UP (ref 22–31)
CREAT SERPL-MCNC: 0.82 MG/DL — SIGNIFICANT CHANGE UP (ref 0.5–1.3)
CULTURE RESULTS: NO GROWTH — SIGNIFICANT CHANGE UP
D DIMER BLD IA.RAPID-MCNC: 3142 NG/ML DDU — HIGH
DACRYOCYTES BLD QL SMEAR: SLIGHT — SIGNIFICANT CHANGE UP
EGFR: 69 ML/MIN/1.73M2 — SIGNIFICANT CHANGE UP
EOSINOPHIL # BLD AUTO: 0.09 K/UL — SIGNIFICANT CHANGE UP (ref 0–0.5)
EOSINOPHIL NFR BLD AUTO: 1 % — SIGNIFICANT CHANGE UP (ref 0–6)
GLUCOSE SERPL-MCNC: 99 MG/DL — SIGNIFICANT CHANGE UP (ref 70–99)
GRAM STN FLD: SIGNIFICANT CHANGE UP
HCT VFR BLD CALC: 27.3 % — LOW (ref 34.5–45)
HCT VFR BLD CALC: 27.7 % — LOW (ref 34.5–45)
HDLC SERPL-MCNC: 37 MG/DL — LOW
HGB BLD-MCNC: 8.8 G/DL — LOW (ref 11.5–15.5)
HGB BLD-MCNC: 8.9 G/DL — LOW (ref 11.5–15.5)
HYPOCHROMIA BLD QL: SLIGHT — SIGNIFICANT CHANGE UP
INR BLD: 1.46 RATIO — HIGH (ref 0.88–1.16)
INR BLD: 1.49 RATIO — HIGH (ref 0.88–1.16)
INR BLD: 1.5 RATIO — HIGH (ref 0.88–1.16)
LACTATE SERPL-SCNC: 1.9 MMOL/L — SIGNIFICANT CHANGE UP (ref 0.7–2)
LACTATE SERPL-SCNC: 2.4 MMOL/L — HIGH (ref 0.7–2)
LACTATE SERPL-SCNC: 2.7 MMOL/L — HIGH (ref 0.7–2)
LACTATE SERPL-SCNC: 2.8 MMOL/L — HIGH (ref 0.7–2)
LIPID PNL WITH DIRECT LDL SERPL: 59 MG/DL — SIGNIFICANT CHANGE UP
LYMPHOCYTES # BLD AUTO: 1.01 K/UL — SIGNIFICANT CHANGE UP (ref 1–3.3)
LYMPHOCYTES # BLD AUTO: 11 % — LOW (ref 13–44)
MAGNESIUM SERPL-MCNC: 1.9 MG/DL — SIGNIFICANT CHANGE UP (ref 1.6–2.6)
MANUAL SMEAR VERIFICATION: SIGNIFICANT CHANGE UP
MCHC RBC-ENTMCNC: 27.9 PG — SIGNIFICANT CHANGE UP (ref 27–34)
MCHC RBC-ENTMCNC: 28.2 PG — SIGNIFICANT CHANGE UP (ref 27–34)
MCHC RBC-ENTMCNC: 32.1 GM/DL — SIGNIFICANT CHANGE UP (ref 32–36)
MCHC RBC-ENTMCNC: 32.2 GM/DL — SIGNIFICANT CHANGE UP (ref 32–36)
MCV RBC AUTO: 86.8 FL — SIGNIFICANT CHANGE UP (ref 80–100)
MCV RBC AUTO: 87.5 FL — SIGNIFICANT CHANGE UP (ref 80–100)
METHOD TYPE: SIGNIFICANT CHANGE UP
METHOD TYPE: SIGNIFICANT CHANGE UP
MONOCYTES # BLD AUTO: 0.28 K/UL — SIGNIFICANT CHANGE UP (ref 0–0.9)
MONOCYTES NFR BLD AUTO: 3 % — SIGNIFICANT CHANGE UP (ref 2–14)
NEUTROPHILS # BLD AUTO: 7.84 K/UL — HIGH (ref 1.8–7.4)
NEUTROPHILS NFR BLD AUTO: 80 % — HIGH (ref 43–77)
NEUTS BAND # BLD: 5 % — SIGNIFICANT CHANGE UP (ref 0–8)
NON HDL CHOLESTEROL: 73 MG/DL — SIGNIFICANT CHANGE UP
NRBC # BLD: 0 /100 WBCS — SIGNIFICANT CHANGE UP (ref 0–0)
NRBC # BLD: 0 /100 — SIGNIFICANT CHANGE UP (ref 0–0)
NT-PROBNP SERPL-SCNC: 6427 PG/ML — HIGH (ref 0–300)
OVALOCYTES BLD QL SMEAR: SLIGHT — SIGNIFICANT CHANGE UP
PHOSPHATE SERPL-MCNC: 3.7 MG/DL — SIGNIFICANT CHANGE UP (ref 2.5–4.5)
PLAT MORPH BLD: NORMAL — SIGNIFICANT CHANGE UP
PLATELET # BLD AUTO: 189 K/UL — SIGNIFICANT CHANGE UP (ref 150–400)
PLATELET # BLD AUTO: 193 K/UL — SIGNIFICANT CHANGE UP (ref 150–400)
PLATELET CLUMP BLD QL SMEAR: ABNORMAL
POLYCHROMASIA BLD QL SMEAR: SLIGHT — SIGNIFICANT CHANGE UP
POTASSIUM SERPL-MCNC: 3.6 MMOL/L — SIGNIFICANT CHANGE UP (ref 3.5–5.3)
POTASSIUM SERPL-SCNC: 3.6 MMOL/L — SIGNIFICANT CHANGE UP (ref 3.5–5.3)
PROT SERPL-MCNC: 4.7 G/DL — LOW (ref 6–8.3)
PROTHROM AB SERPL-ACNC: 17 SEC — HIGH (ref 10.5–13.4)
PROTHROM AB SERPL-ACNC: 17.3 SEC — HIGH (ref 10.5–13.4)
PROTHROM AB SERPL-ACNC: 17.5 SEC — HIGH (ref 10.5–13.4)
RBC # BLD: 3.12 M/UL — LOW (ref 3.8–5.2)
RBC # BLD: 3.19 M/UL — LOW (ref 3.8–5.2)
RBC # FLD: 13.2 % — SIGNIFICANT CHANGE UP (ref 10.3–14.5)
RBC # FLD: 13.3 % — SIGNIFICANT CHANGE UP (ref 10.3–14.5)
RBC BLD AUTO: ABNORMAL
SODIUM SERPL-SCNC: 135 MMOL/L — SIGNIFICANT CHANGE UP (ref 135–145)
SPECIMEN SOURCE: SIGNIFICANT CHANGE UP
SPECIMEN SOURCE: SIGNIFICANT CHANGE UP
TRIGL SERPL-MCNC: 66 MG/DL — SIGNIFICANT CHANGE UP
TROPONIN I, HIGH SENSITIVITY RESULT: 148.9 NG/L — HIGH
TROPONIN I, HIGH SENSITIVITY RESULT: 166.4 NG/L — HIGH
TSH SERPL-MCNC: 2.41 UIU/ML — SIGNIFICANT CHANGE UP (ref 0.36–3.74)
WBC # BLD: 10.92 K/UL — HIGH (ref 3.8–10.5)
WBC # BLD: 9.22 K/UL — SIGNIFICANT CHANGE UP (ref 3.8–10.5)
WBC # FLD AUTO: 10.92 K/UL — HIGH (ref 3.8–10.5)
WBC # FLD AUTO: 9.22 K/UL — SIGNIFICANT CHANGE UP (ref 3.8–10.5)

## 2022-12-25 PROCEDURE — 99231 SBSQ HOSP IP/OBS SF/LOW 25: CPT

## 2022-12-25 PROCEDURE — 93306 TTE W/DOPPLER COMPLETE: CPT | Mod: 26

## 2022-12-25 PROCEDURE — 93010 ELECTROCARDIOGRAM REPORT: CPT | Mod: 76

## 2022-12-25 PROCEDURE — 99223 1ST HOSP IP/OBS HIGH 75: CPT

## 2022-12-25 RX ORDER — POLYETHYLENE GLYCOL 3350 17 G/17G
17 POWDER, FOR SOLUTION ORAL
Refills: 0 | Status: DISCONTINUED | OUTPATIENT
Start: 2022-12-25 | End: 2022-12-26

## 2022-12-25 RX ORDER — VANCOMYCIN HCL 1 G
750 VIAL (EA) INTRAVENOUS EVERY 12 HOURS
Refills: 0 | Status: DISCONTINUED | OUTPATIENT
Start: 2022-12-25 | End: 2022-12-25

## 2022-12-25 RX ORDER — HEPARIN SODIUM 5000 [USP'U]/ML
INJECTION INTRAVENOUS; SUBCUTANEOUS
Qty: 25000 | Refills: 0 | Status: COMPLETED | OUTPATIENT
Start: 2022-12-25 | End: 2022-12-26

## 2022-12-25 RX ORDER — ASPIRIN/CALCIUM CARB/MAGNESIUM 324 MG
81 TABLET ORAL DAILY
Refills: 0 | Status: DISCONTINUED | OUTPATIENT
Start: 2022-12-25 | End: 2022-12-26

## 2022-12-25 RX ORDER — COLLAGENASE CLOSTRIDIUM HIST. 250 UNIT/G
1 OINTMENT (GRAM) TOPICAL DAILY
Refills: 0 | Status: DISCONTINUED | OUTPATIENT
Start: 2022-12-25 | End: 2022-12-26

## 2022-12-25 RX ORDER — SENNA PLUS 8.6 MG/1
2 TABLET ORAL AT BEDTIME
Refills: 0 | Status: DISCONTINUED | OUTPATIENT
Start: 2022-12-25 | End: 2022-12-26

## 2022-12-25 RX ADMIN — PIPERACILLIN AND TAZOBACTAM 25 GRAM(S): 4; .5 INJECTION, POWDER, LYOPHILIZED, FOR SOLUTION INTRAVENOUS at 21:35

## 2022-12-25 RX ADMIN — Medication 650 MILLIGRAM(S): at 18:17

## 2022-12-25 RX ADMIN — POLYETHYLENE GLYCOL 3350 17 GRAM(S): 17 POWDER, FOR SOLUTION ORAL at 05:29

## 2022-12-25 RX ADMIN — SODIUM CHLORIDE 100 MILLILITER(S): 9 INJECTION, SOLUTION INTRAVENOUS at 07:32

## 2022-12-25 RX ADMIN — HEPARIN SODIUM 700 UNIT(S)/HR: 5000 INJECTION INTRAVENOUS; SUBCUTANEOUS at 00:27

## 2022-12-25 RX ADMIN — PANTOPRAZOLE SODIUM 40 MILLIGRAM(S): 20 TABLET, DELAYED RELEASE ORAL at 15:15

## 2022-12-25 RX ADMIN — POLYETHYLENE GLYCOL 3350 17 GRAM(S): 17 POWDER, FOR SOLUTION ORAL at 18:16

## 2022-12-25 RX ADMIN — Medication 81 MILLIGRAM(S): at 15:16

## 2022-12-25 RX ADMIN — SODIUM CHLORIDE 125 MILLILITER(S): 9 INJECTION, SOLUTION INTRAVENOUS at 15:21

## 2022-12-25 RX ADMIN — Medication 1 APPLICATION(S): at 15:15

## 2022-12-25 RX ADMIN — PIPERACILLIN AND TAZOBACTAM 25 GRAM(S): 4; .5 INJECTION, POWDER, LYOPHILIZED, FOR SOLUTION INTRAVENOUS at 05:30

## 2022-12-25 RX ADMIN — Medication 650 MILLIGRAM(S): at 18:45

## 2022-12-25 RX ADMIN — HEPARIN SODIUM 800 UNIT(S)/HR: 5000 INJECTION INTRAVENOUS; SUBCUTANEOUS at 18:15

## 2022-12-25 RX ADMIN — HEPARIN SODIUM 700 UNIT(S)/HR: 5000 INJECTION INTRAVENOUS; SUBCUTANEOUS at 07:44

## 2022-12-25 RX ADMIN — Medication 100 MILLIGRAM(S): at 05:30

## 2022-12-25 RX ADMIN — MIDODRINE HYDROCHLORIDE 5 MILLIGRAM(S): 2.5 TABLET ORAL at 15:15

## 2022-12-25 RX ADMIN — SODIUM CHLORIDE 100 MILLILITER(S): 9 INJECTION, SOLUTION INTRAVENOUS at 05:56

## 2022-12-25 RX ADMIN — PIPERACILLIN AND TAZOBACTAM 25 GRAM(S): 4; .5 INJECTION, POWDER, LYOPHILIZED, FOR SOLUTION INTRAVENOUS at 15:15

## 2022-12-25 RX ADMIN — MIDODRINE HYDROCHLORIDE 5 MILLIGRAM(S): 2.5 TABLET ORAL at 05:32

## 2022-12-25 RX ADMIN — SENNA PLUS 2 TABLET(S): 8.6 TABLET ORAL at 21:39

## 2022-12-25 RX ADMIN — MIDODRINE HYDROCHLORIDE 5 MILLIGRAM(S): 2.5 TABLET ORAL at 21:39

## 2022-12-25 RX ADMIN — HEPARIN SODIUM 700 UNIT(S)/HR: 5000 INJECTION INTRAVENOUS; SUBCUTANEOUS at 07:33

## 2022-12-25 NOTE — PROGRESS NOTE ADULT - ASSESSMENT
PLAN:    -on heparin drip for DVT/PE, follow PTT titrate per nomogram  -on anbx for decubitus  -per sign out plan is for OR tomorrow for debridement, NPO after midnight tonight   -discuss with cards and surgery how long heparin gtt to be held ( if at all) prior to procedure

## 2022-12-25 NOTE — PATIENT PROFILE ADULT - FALL HARM RISK - HARM RISK INTERVENTIONS

## 2022-12-25 NOTE — DIETITIAN INITIAL EVALUATION ADULT - ADD RECOMMEND
1. Recommend MVI, zinc, and ascorbic acid supplement to aid in wound healing  2. Recommend Ensure Plus High Protein 8oz PO BID (Provides 700kcal & 40grams of Protein) to optimize nutritional status  3. Patient will recive house made smoothie 1x/daily (provide vanilla greek yogurt, peanut butter, and banana).  4. Monitor daily PO intakes, weights, and BM regularity  5. Provide ongoing encouragement and assistance with meals

## 2022-12-25 NOTE — DIETITIAN INITIAL EVALUATION ADULT - PERTINENT LABORATORY DATA
12-25    135  |  102  |  22  ----------------------------<  99  3.6   |  24  |  0.82    Ca    7.4<L>      25 Dec 2022 06:28  Phos  3.7     12-25  Mg     1.9     12-25    TPro  4.7<L>  /  Alb  1.3<L>  /  TBili  0.5  /  DBili  x   /  AST  41<H>  /  ALT  33  /  AlkPhos  142<H>  12-25

## 2022-12-25 NOTE — DIETITIAN NUTRITION RISK NOTIFICATION - ADDITIONAL COMMENTS/DIETITIAN RECOMMENDATIONS
Ensure Plus High Protein 8oz PO BID (Provides 700kcal & 40grams of Protein) + housemade smoothie 1x/daily

## 2022-12-25 NOTE — PATIENT PROFILE ADULT - FUNCTIONAL ASSESSMENT - BASIC MOBILITY 6.
3-calculated by average/Not able to assess (calculate score using [facility] averaging method)

## 2022-12-25 NOTE — PROGRESS NOTE ADULT - SUBJECTIVE AND OBJECTIVE BOX
87 yof h/o Dementia, colon cancer s/p resection brought in by EMS to the ED by her daughter for a decubitus ulcer and increasing weakness. edbound Pt presents from home, where she lives with her daughter, for appearing sicker, sob, and worsening know sacral decubitus ulcer.  Workup demonstrates leukocytosis, Ct with sacral decub and tracks into left gluteal fold.  Abx are started, this morning pt seen with Nursing staff, wound foul smell, and eschar noted.       PAST MEDICAL & SURGICAL HISTORY:  Colon cancer      Dementia      S/P colon resection      MEDICATIONS  (STANDING):  clindamycin IVPB      clindamycin IVPB 900 milliGRAM(s) IV Intermittent every 8 hours  collagenase Ointment 1 Application(s) Topical daily  heparin  Infusion.  Unit(s)/Hr (8 mL/Hr) IV Continuous <Continuous>  lactated ringers. 1000 milliLiter(s) (100 mL/Hr) IV Continuous <Continuous>  midodrine 5 milliGRAM(s) Oral every 8 hours  pantoprazole  Injectable 40 milliGRAM(s) IV Push daily  piperacillin/tazobactam IVPB.. 3.375 Gram(s) IV Intermittent every 8 hours  polyethylene glycol 3350 17 Gram(s) Oral two times a day  senna 2 Tablet(s) Oral at bedtime  vancomycin  IVPB 750 milliGRAM(s) IV Intermittent every 12 hours    MEDICATIONS  (PRN):  acetaminophen     Tablet .. 650 milliGRAM(s) Oral every 6 hours PRN Temp greater or equal to 38C (100.4F), Mild Pain (1 - 3)         PE: Vital Signs Last 24 Hrs  T(C): 36.4 (25 Dec 2022 05:17), Max: 37.7 (24 Dec 2022 22:37)  T(F): 97.5 (25 Dec 2022 05:17), Max: 99.8 (24 Dec 2022 22:37)  HR: 81 (25 Dec 2022 05:17) (80 - 123)  BP: 127/66 (25 Dec 2022 05:17) (80/45 - 144/90)  BP(mean): 93 (24 Dec 2022 11:55) (93 - 93)  RR: 18 (25 Dec 2022 05:17) (17 - 31)  SpO2: 100% (25 Dec 2022 05:17) (93% - 100%)    Parameters below as of 25 Dec 2022 05:17  Patient On (Oxygen Delivery Method): room air  Gen: in bed NAD  abdL soft, nt, nd, ng   sacral: wound foul smell, eschar noted, purulent discharge, no crepitus noted on surrouding area., not extending to perineum.  calfs: soft, nontender, no swelling                             8.9    9.22  )-----------( 193      ( 25 Dec 2022 06:30 )             27.7   12-25    135  |  102  |  22  ----------------------------<  99  3.6   |  24  |  0.82    Ca    7.4<L>      25 Dec 2022 06:28  Phos  3.7     12-25  Mg     1.9     12-25    TPro  4.7<L>  /  Alb  1.3<L>  /  TBili  0.5  /  DBili  x   /  AST  41<H>  /  ALT  33  /  AlkPhos  142<H>  12-25

## 2022-12-25 NOTE — PROGRESS NOTE ADULT - ASSESSMENT
87 yof h/o Dementia, colon cancer s/p resection, Ct with sacral decub and tracks into left gluteal fold.  Abx are started, this morning pt seen with Nursing staff, wound foul smell, and eschar noted.

## 2022-12-25 NOTE — PATIENT PROFILE ADULT - NSPROEXTENSIONSOFSELF_GEN_A_NUR
HISTORY     indicated that his mother is alive. He indicated that his father is . family history includes Arthritis in his mother; Diabetes in his father; Heart Disease in his father. SOCIAL HISTORY      reports that he has been smoking Cigarettes. He has a 60.00 pack-year smoking history. He has never used smokeless tobacco. He reports that he drinks alcohol. He reports that he does not use drugs. PHYSICAL EXAM     INITIAL VITALS:  height is 6' 4\" (1.93 m) and weight is 196 lb (88.9 kg). His temporal temperature is 97.3 °F (36.3 °C). His blood pressure is 142/87 (abnormal) and his pulse is 78. His respiration is 16 and oxygen saturation is 97%. Physical Exam   Constitutional: He is oriented to person, place, and time. He appears well-developed and well-nourished. HENT:   Head: Normocephalic and atraumatic. Right Ear: External ear normal.   Left Ear: External ear normal.   Eyes: Right eye exhibits no discharge. Left eye exhibits no discharge. No scleral icterus. Neck: Normal range of motion. No JVD present. No tracheal deviation present. No thyromegaly present. Cardiovascular: Normal rate and regular rhythm. Exam reveals no gallop and no friction rub. No murmur heard. Pulmonary/Chest: Effort normal and breath sounds normal. No stridor. No respiratory distress. He has no wheezes. He has no rales. Abdominal: Soft. He exhibits no distension. There is no tenderness. There is no rebound and no guarding. Musculoskeletal: He exhibits no edema or tenderness. Left thumb, complex laceration, probably about  5 cm, patient is able to flex and extend the digit. There is also fingernail involvement. Neurological: He is alert and oriented to person, place, and time. Coordination normal.   Skin: Skin is warm and dry. No rash (On exposed body surfaces) noted. He is not diaphoretic. Psychiatric: He has a normal mood and affect.  His behavior is normal. Thought content normal. none

## 2022-12-25 NOTE — DIETITIAN INITIAL EVALUATION ADULT - PERTINENT MEDS FT
MEDICATIONS  (STANDING):  aspirin  chewable 81 milliGRAM(s) Oral daily  collagenase Ointment 1 Application(s) Topical daily  heparin  Infusion.  Unit(s)/Hr (8 mL/Hr) IV Continuous <Continuous>  lactated ringers. 1000 milliLiter(s) (125 mL/Hr) IV Continuous <Continuous>  midodrine 5 milliGRAM(s) Oral every 8 hours  pantoprazole  Injectable 40 milliGRAM(s) IV Push daily  piperacillin/tazobactam IVPB.. 3.375 Gram(s) IV Intermittent every 8 hours  polyethylene glycol 3350 17 Gram(s) Oral two times a day  senna 2 Tablet(s) Oral at bedtime  vancomycin  IVPB 750 milliGRAM(s) IV Intermittent every 12 hours    MEDICATIONS  (PRN):  acetaminophen     Tablet .. 650 milliGRAM(s) Oral every 6 hours PRN Temp greater or equal to 38C (100.4F), Mild Pain (1 - 3)

## 2022-12-25 NOTE — PROGRESS NOTE ADULT - SUBJECTIVE AND OBJECTIVE BOX
F/U Note:    87y Female admitted with decubitus ulcer, DVT, and PE        Vital Signs Last 24 Hrs  T(C): 36.7 (25 Dec 2022 14:32), Max: 37.7 (24 Dec 2022 22:37)  T(F): 98 (25 Dec 2022 14:32), Max: 99.8 (24 Dec 2022 22:37)  HR: 80 (25 Dec 2022 14:32) (80 - 81)  BP: 116/69 (25 Dec 2022 14:32) (101/58 - 127/66)  RR: 20 (25 Dec 2022 14:32) (17 - 20)  SpO2: 100% (25 Dec 2022 14:32) (99% - 100%)    Parameters below as of 25 Dec 2022 14:32  Patient On (Oxygen Delivery Method): room air                                8.9    9.22  )-----------( 193      ( 25 Dec 2022 06:30 )             27.7         12-25    135  |  102  |  22  ----------------------------<  99  3.6   |  24  |  0.82    Ca    7.4<L>      25 Dec 2022 06:28  Phos  3.7     12-25  Mg     1.9     12-25    TPro  4.7<L>  /  Alb  1.3<L>  /  TBili  0.5  /  DBili  x   /  AST  41<H>  /  ALT  33  /  AlkPhos  142<H>  12-25                NEURO: no gross defect  CV: (+) S1/S2, rrr, no mrg  RESP: CTA b/l  GI: soft, non tender

## 2022-12-25 NOTE — DIETITIAN INITIAL EVALUATION ADULT - ORAL INTAKE PTA/DIET HISTORY
Per patients daughter, patient would only eat "junk food" at home; refused to eat a balanced meal. Daughter states patient had gradually lost weight due to health issues. Reports fecal incontinence since spring.

## 2022-12-25 NOTE — DIETITIAN NUTRITION RISK NOTIFICATION - TREATMENT: THE FOLLOWING DIET HAS BEEN RECOMMENDED
Diet, Regular:   Supplement Feeding Modality:  Oral  Ensure Plus High Protein Cans or Servings Per Day:  1       Frequency:  Two Times a day (12-25-22 @ 13:27) [Pending Verification By Attending]  Diet, Regular (12-24-22 @ 16:13) [Active]

## 2022-12-25 NOTE — PROGRESS NOTE ADULT - SUBJECTIVE AND OBJECTIVE BOX
Patient is a 87y old  Female who presents with a chief complaint of decubitus ulcer (24 Dec 2022 16:31)      Events last 24 hours:   Patient remains DNR/DNI with bilateral pulmonary emboli. No events overnight. She ate breakfast this morning, no complaints. She remains afebrile,     PAST MEDICAL & SURGICAL HISTORY:  Colon cancer  Dementia  S/P colon resection        Medications:  clindamycin IVPB      clindamycin IVPB 900 milliGRAM(s) IV Intermittent every 8 hours  piperacillin/tazobactam IVPB.. 3.375 Gram(s) IV Intermittent every 8 hours  vancomycin  IVPB 750 milliGRAM(s) IV Intermittent every 12 hours    midodrine 5 milliGRAM(s) Oral every 8 hours      acetaminophen     Tablet .. 650 milliGRAM(s) Oral every 6 hours PRN      heparin  Infusion.  Unit(s)/Hr IV Continuous <Continuous>    pantoprazole  Injectable 40 milliGRAM(s) IV Push daily  polyethylene glycol 3350 17 Gram(s) Oral two times a day  senna 2 Tablet(s) Oral at bedtime        lactated ringers. 1000 milliLiter(s) IV Continuous <Continuous>                ICU Vital Signs Last 24 Hrs  T(C): 36.4 (25 Dec 2022 05:17), Max: 37.7 (24 Dec 2022 22:37)  T(F): 97.5 (25 Dec 2022 05:17), Max: 99.8 (24 Dec 2022 22:37)  HR: 81 (25 Dec 2022 05:17) (80 - 123)  BP: 127/66 (25 Dec 2022 05:17) (80/45 - 144/90)  BP(mean): 93 (24 Dec 2022 11:55) (93 - 93)  ABP: --  ABP(mean): --  RR: 18 (25 Dec 2022 05:17) (17 - 31)  SpO2: 100% (25 Dec 2022 05:17) (93% - 100%)    O2 Parameters below as of 25 Dec 2022 05:17  Patient On (Oxygen Delivery Method): room air            ABG - ( 24 Dec 2022 14:45 )  pH, Arterial: 7.49  pH, Blood: x     /  pCO2: 27    /  pO2: 289   / HCO3: 21    / Base Excess: -2.7  /  SaO2: 99.2                I&O's Detail    24 Dec 2022 07:01  -  25 Dec 2022 07:00  --------------------------------------------------------  IN:  Total IN: 0 mL    OUT:    Voided (mL): 100 mL  Total OUT: 100 mL    Total NET: -100 mL            LABS:                        8.9    9.22  )-----------( 193      ( 25 Dec 2022 06:30 )             27.7     12    135  |  102  |  22  ----------------------------<  99  3.6   |  24  |  0.82    Ca    7.4<L>      25 Dec 2022 06:28  Phos  3.7       Mg     1.9         TPro  4.7<L>  /  Alb  1.3<L>  /  TBili  0.5  /  DBili  x   /  AST  41<H>  /  ALT  33  /  AlkPhos  142<H>        CARDIAC MARKERS ( 24 Dec 2022 16:48 )  x     / x     / 218 U/L / x     / 5.3 ng/mL      CAPILLARY BLOOD GLUCOSE        PT/INR - ( 25 Dec 2022 06:30 )   PT: 17.3 sec;   INR: 1.49 ratio         PTT - ( 25 Dec 2022 06:30 )  PTT:62.2 sec  Urinalysis Basic - ( 24 Dec 2022 14:42 )    Color: Yellow / Appearance: Clear / S.015 / pH: x  Gluc: x / Ketone: Negative  / Bili: Small / Urobili: 4   Blood: x / Protein: 30 mg/dL / Nitrite: Negative   Leuk Esterase: Trace / RBC: 26-50 /HPF / WBC 3-5 /HPF   Sq Epi: x / Non Sq Epi: Neg.-Few / Bacteria: Negative /HPF      CULTURES:  Rapid RVP Result: NotDetec (22 @ 11:47)      Physical Examination:    General: Awake, but disoriented. Alert, interactive, on nasal cannula oxygen    HEENT: Pupils equal, reactive to light.  Symmetric.    PULM: Diminished breath sounds on auscultation bilaterally, no significant sputum production    CVS: Regular rate and rhythm, no murmurs, rubs, or gallops    ABD: Soft, nondistended, nontender, normoactive bowel sounds, no masses    EXT: 2+ edema bilateral LE, no cyanosis        CRITICAL CARE TIME SPENT:  minutes of critical care time spent providing medical care for patient's acute illness/conditions that impairs at least one vital organ system and/or poses a high risk of imminent or life threatening deterioration in the patient's condition. It includes time spent evaluating and treating the patient's acute illness as well as time spent reviewing labs, radiology, discussing goals of care with patient and/or patient's family, and discussing the case with a multidisciplinary team, including the eICU, in an effort to prevent further life threatening deterioration or end organ damage. This time is independent of any procedures performed.       Patient is a 87y old  Female who presents with a chief complaint of decubitus ulcer (24 Dec 2022 16:31)      Events last 24 hours:   Patient remains DNR/DNI with bilateral pulmonary emboli. No events overnight. She ate breakfast this morning, no complaints. She remains afebrile,   patient remains on n/c   alert, responsive. tolerating a/c       PAST MEDICAL & SURGICAL HISTORY:  Colon cancer  Dementia  S/P colon resection        Medications:  clindamycin IVPB      clindamycin IVPB 900 milliGRAM(s) IV Intermittent every 8 hours  piperacillin/tazobactam IVPB.. 3.375 Gram(s) IV Intermittent every 8 hours  vancomycin  IVPB 750 milliGRAM(s) IV Intermittent every 12 hours  midodrine 5 milliGRAM(s) Oral every 8 hours  acetaminophen     Tablet .. 650 milliGRAM(s) Oral every 6 hours PRN  heparin  Infusion.  Unit(s)/Hr IV Continuous <Continuous>  pantoprazole  Injectable 40 milliGRAM(s) IV Push daily  polyethylene glycol 3350 17 Gram(s) Oral two times a day  senna 2 Tablet(s) Oral at bedtime  Lactated ringers. 1000 milliLiter(s) IV Continuous <Continuous>                ICU Vital Signs Last 24 Hrs  T(C): 36.4 (25 Dec 2022 05:17), Max: 37.7 (24 Dec 2022 22:37)  T(F): 97.5 (25 Dec 2022 05:17), Max: 99.8 (24 Dec 2022 22:37)  HR: 81 (25 Dec 2022 05:17) (80 - 123)  BP: 127/66 (25 Dec 2022 05:17) (80/45 - 144/90)  BP(mean): 93 (24 Dec 2022 11:55) (93 - 93)  ABP: --  ABP(mean): --  RR: 18 (25 Dec 2022 05:17) (17 - 31)  SpO2: 100% (25 Dec 2022 05:17) (93% - 100%)    O2 Parameters below as of 25 Dec 2022 05:17  Patient On (Oxygen Delivery Method): room air            ABG - ( 24 Dec 2022 14:45 )  pH, Arterial: 7.49  pH, Blood: x     /  pCO2: 27    /  pO2: 289   / HCO3: 21    / Base Excess: -2.7  /  SaO2: 99.2                I&O's Detail    24 Dec 2022 07:01  -  25 Dec 2022 07:00  --------------------------------------------------------  IN:  Total IN: 0 mL    OUT:    Voided (mL): 100 mL  Total OUT: 100 mL    Total NET: -100 mL            LABS:                        8.9    9.22  )-----------( 193      ( 25 Dec 2022 06:30 )             27.7     12-    135  |  102  |  22  ----------------------------<  99  3.6   |  24  |  0.82    Ca    7.4<L>      25 Dec 2022 06:28  Phos  3.7     12  Mg     1.9         TPro  4.7<L>  /  Alb  1.3<L>  /  TBili  0.5  /  DBili  x   /  AST  41<H>  /  ALT  33  /  AlkPhos  142<H>  12-25      CARDIAC MARKERS ( 24 Dec 2022 16:48 )  x     / x     / 218 U/L / x     / 5.3 ng/mL      CAPILLARY BLOOD GLUCOSE        PT/INR - ( 25 Dec 2022 06:30 )   PT: 17.3 sec;   INR: 1.49 ratio         PTT - ( 25 Dec 2022 06:30 )  PTT:62.2 sec  Urinalysis Basic - ( 24 Dec 2022 14:42 )    Color: Yellow / Appearance: Clear / S.015 / pH: x  Gluc: x / Ketone: Negative  / Bili: Small / Urobili: 4   Blood: x / Protein: 30 mg/dL / Nitrite: Negative   Leuk Esterase: Trace / RBC: 26-50 /HPF / WBC 3-5 /HPF   Sq Epi: x / Non Sq Epi: Neg.-Few / Bacteria: Negative /HPF      CULTURES:  Rapid RVP Result: NotDetec (22 @ 11:47)      Physical Examination:    General: Awake, but disoriented. Alert, interactive, on nasal cannula oxygen    HEENT: Pupils equal, reactive to light.  Symmetric.    PULM: Diminished breath sounds on auscultation bilaterally, no significant sputum production    CVS: Regular rate and rhythm, no murmurs, rubs, or gallops    ABD: Soft, nondistended, nontender, normoactive bowel sounds, no masses    EXT: 2+ edema bilateral LE, no cyanosis        CRITICAL CARE TIME SPENT:  minutes of critical care time spent providing medical care for patient's acute illness/conditions that impairs at least one vital organ system and/or poses a high risk of imminent or life threatening deterioration in the patient's condition. It includes time spent evaluating and treating the patient's acute illness as well as time spent reviewing labs, radiology, discussing goals of care with patient and/or patient's family, and discussing the case with a multidisciplinary team, including the eICU, in an effort to prevent further life threatening deterioration or end organ damage. This time is independent of any procedures performed.

## 2022-12-25 NOTE — DIETITIAN INITIAL EVALUATION ADULT - OTHER INFO
Patient confused; unable to obtain pertinent information from patient at this time. Patient with suboptimal PO intakes , consuming 26-50% of meals at this time per nursing flow sheets. Per daughter, patient requires total assistance. Obtained food preferences from family to enhance PO intake (updated in patient diet cardex). Patient will receive house made smoothie at lunch daily (provides vanilla greek yogurt, peanut butter and banana) and recommend Ensure Plus High Protein 8oz PO BID (Provides 700kcal & 40grams of Protein) to optimize nutritional status. Unstageable pressure injury noted at sacrum, Stage 2 pressure injury noted at left hip, Stage 1 pressure injury noted at right hip. Recommend MVI, zinc, and ascorbic acid supplement to aid in wound healing.

## 2022-12-25 NOTE — PATIENT PROFILE ADULT - STATED REASON FOR ADMISSION
Shortness of breath Female Completion Statement: After discussing her treatment course we decided to discontinue isotretinoin therapy at this time. I explained that she would need to continue her birth control methods for at least one month after the last dosage. She should also get a pregnancy test one month after the last dose. She shouldn't donate blood for one month after the last dose. She should call with any new symptoms of depression.

## 2022-12-25 NOTE — PROGRESS NOTE ADULT - ASSESSMENT
87-year-old female brought in by EMS to the ED by her daughter for a decubitus ulcer.  Patient was also short of breath per EMS.  Per patient's daughter she had a small ulcer that began 6 months ago, which they were doing local wound care for, however she reports she has not seen the ulcer in 2 weeks, when she looked at it today she saw that it was large and very deep.  As per daughter, patient has been declining in health for the past year, last seen by pmd 18 months ago. Patient was ambulating with poor balance, became incontinent over 6 months ago. She developed and a sacral ulcer while wearing a diaper and being less mobile. She eventually became more sedentary, bedbound about  2 weeks ago. Sacral ulcer soon developed after that and was noticed by daughter as the size of a coin. Daughter noticed today the ulcer became very large. There was no reported fever, chills no chest pain no nausea vomiting.    1. Septic shock due to Large sacral decubiti with lactic acidosis  2. Right femoral vein DVT  3. Bilateral pulmonary emboli   4. Elevated troponin levels suspect from PE  5. Colon cancer    - Continue current management  - Continue heparin infusion - now at therapeutic goal  - Continue supplemental oxygen via nasal cannula  - monitor oxygen saturation, BP, heart rate  - Continue antibiotics, IVF resuscitation -lactate ringers solution  - Midodrine started for borderline bp  - Monitor urine output, kidney function  - sacral decub / wound care as per surgery  - Family does not want vasopressors  - Repeat EKG, chest xray, ABG as needed, Echo pending  - Follow up cultures, lab, lactic acid levels decreasing  - GI and DVT prophylaxis  - Patient is at risk for sudden death, poor prognosis  - Palliative care evaluation pending  - ID consult pending - called  - Patient is DNR/DNI with MOLST in chart  - Case d/w patient's daughter at bedside 87-year-old female brought in by EMS to the ED by her daughter for a decubitus ulcer.  Patient was also short of breath per EMS.  Per patient's daughter she had a small ulcer that began 6 months ago, which they were doing local wound care for, however she reports she has not seen the ulcer in 2 weeks, when she looked at it today she saw that it was large and very deep.  As per daughter, patient has been declining in health for the past year, last seen by pmd 18 months ago. Patient was ambulating with poor balance, became incontinent over 6 months ago. She developed and a sacral ulcer while wearing a diaper and being less mobile. She eventually became more sedentary, bedbound about  2 weeks ago. Sacral ulcer soon developed after that and was noticed by daughter as the size of a coin. Daughter noticed today the ulcer became very large. There was no reported fever, chills no chest pain no nausea vomiting.    1. Septic shock due to Large sacral decubiti with lactic acidosis  2. Right femoral vein DVT  3. Bilateral pulmonary emboli   4. Elevated troponin levels suspect from PE  5. Colon cancer  6. Severe Protein malnutrition with low hypoalbuminemia     - Continue current management  - Continue heparin infusion - now at therapeutic goal  - Continue supplemental oxygen via nasal cannula  - monitor oxygen saturation, BP, heart rate  - Continue antibiotics, IVF resuscitation -lactate ringers solution increase to 125  - Midodrine started for borderline bp  - Monitor urine output, kidney function  - sacral decub / wound care as per surgery - plan for debridement in am   - Family does not want vasopressors  - Repeat EKG, chest xray, ABG as needed, Echo pending  - Follow up cultures, lab, lactic acid levels decreasing  - GI and DVT prophylaxis  - Patient is at risk for sudden death, poor prognosis  - Palliative care evaluation pending  - ID consult pending - called  - Patient is DNR/DNI with MOLST in chart  - Case d/w patient's daughter at bedside

## 2022-12-26 ENCOUNTER — TRANSCRIPTION ENCOUNTER (OUTPATIENT)
Age: 87
End: 2022-12-26

## 2022-12-26 DIAGNOSIS — L89.154 PRESSURE ULCER OF SACRAL REGION, STAGE 4: ICD-10-CM

## 2022-12-26 LAB
APTT BLD: 31.6 SEC — SIGNIFICANT CHANGE UP (ref 27.5–35.5)
APTT BLD: 31.9 SEC — SIGNIFICANT CHANGE UP (ref 27.5–35.5)
APTT BLD: 41.4 SEC — HIGH (ref 27.5–35.5)
CULTURE RESULTS: SIGNIFICANT CHANGE UP
GRAM STN FLD: SIGNIFICANT CHANGE UP
HCT VFR BLD CALC: 26.2 % — LOW (ref 34.5–45)
HCT VFR BLD CALC: 28.3 % — LOW (ref 34.5–45)
HCT VFR BLD CALC: 28.8 % — LOW (ref 34.5–45)
HGB BLD-MCNC: 8.5 G/DL — LOW (ref 11.5–15.5)
HGB BLD-MCNC: 9.1 G/DL — LOW (ref 11.5–15.5)
HGB BLD-MCNC: 9.4 G/DL — LOW (ref 11.5–15.5)
INR BLD: 1.4 RATIO — HIGH (ref 0.88–1.16)
MCHC RBC-ENTMCNC: 28.1 PG — SIGNIFICANT CHANGE UP (ref 27–34)
MCHC RBC-ENTMCNC: 28.3 PG — SIGNIFICANT CHANGE UP (ref 27–34)
MCHC RBC-ENTMCNC: 28.4 PG — SIGNIFICANT CHANGE UP (ref 27–34)
MCHC RBC-ENTMCNC: 32.2 GM/DL — SIGNIFICANT CHANGE UP (ref 32–36)
MCHC RBC-ENTMCNC: 32.4 GM/DL — SIGNIFICANT CHANGE UP (ref 32–36)
MCHC RBC-ENTMCNC: 32.6 GM/DL — SIGNIFICANT CHANGE UP (ref 32–36)
MCV RBC AUTO: 86.2 FL — SIGNIFICANT CHANGE UP (ref 80–100)
MCV RBC AUTO: 87.6 FL — SIGNIFICANT CHANGE UP (ref 80–100)
MCV RBC AUTO: 87.9 FL — SIGNIFICANT CHANGE UP (ref 80–100)
NRBC # BLD: 0 /100 WBCS — SIGNIFICANT CHANGE UP (ref 0–0)
ORGANISM # SPEC MICROSCOPIC CNT: SIGNIFICANT CHANGE UP
ORGANISM # SPEC MICROSCOPIC CNT: SIGNIFICANT CHANGE UP
PLATELET # BLD AUTO: 184 K/UL — SIGNIFICANT CHANGE UP (ref 150–400)
PLATELET # BLD AUTO: 199 K/UL — SIGNIFICANT CHANGE UP (ref 150–400)
PLATELET # BLD AUTO: 215 K/UL — SIGNIFICANT CHANGE UP (ref 150–400)
PROTHROM AB SERPL-ACNC: 16.3 SEC — HIGH (ref 10.5–13.4)
RBC # BLD: 2.99 M/UL — LOW (ref 3.8–5.2)
RBC # BLD: 3.22 M/UL — LOW (ref 3.8–5.2)
RBC # BLD: 3.34 M/UL — LOW (ref 3.8–5.2)
RBC # FLD: 13.2 % — SIGNIFICANT CHANGE UP (ref 10.3–14.5)
RBC # FLD: 13.3 % — SIGNIFICANT CHANGE UP (ref 10.3–14.5)
RBC # FLD: 13.4 % — SIGNIFICANT CHANGE UP (ref 10.3–14.5)
SPECIMEN SOURCE: SIGNIFICANT CHANGE UP
WBC # BLD: 10.13 K/UL — SIGNIFICANT CHANGE UP (ref 3.8–10.5)
WBC # BLD: 13.69 K/UL — HIGH (ref 3.8–10.5)
WBC # BLD: 9.46 K/UL — SIGNIFICANT CHANGE UP (ref 3.8–10.5)
WBC # FLD AUTO: 10.13 K/UL — SIGNIFICANT CHANGE UP (ref 3.8–10.5)
WBC # FLD AUTO: 13.69 K/UL — HIGH (ref 3.8–10.5)
WBC # FLD AUTO: 9.46 K/UL — SIGNIFICANT CHANGE UP (ref 3.8–10.5)

## 2022-12-26 PROCEDURE — ZZZZZ: CPT

## 2022-12-26 PROCEDURE — 88305 TISSUE EXAM BY PATHOLOGIST: CPT | Mod: 26

## 2022-12-26 PROCEDURE — 88311 DECALCIFY TISSUE: CPT | Mod: 26

## 2022-12-26 PROCEDURE — 11044 DBRDMT BONE 1ST 20 SQ CM/<: CPT

## 2022-12-26 PROCEDURE — 11047 DBRDMT BONE EACH ADDL: CPT

## 2022-12-26 PROCEDURE — 11043 DBRDMT MUSC&/FSCA 1ST 20/<: CPT | Mod: 59

## 2022-12-26 RX ORDER — ASPIRIN/CALCIUM CARB/MAGNESIUM 324 MG
81 TABLET ORAL DAILY
Refills: 0 | Status: DISCONTINUED | OUTPATIENT
Start: 2022-12-26 | End: 2022-12-28

## 2022-12-26 RX ORDER — HEPARIN SODIUM 5000 [USP'U]/ML
3500 INJECTION INTRAVENOUS; SUBCUTANEOUS EVERY 6 HOURS
Refills: 0 | Status: DISCONTINUED | OUTPATIENT
Start: 2022-12-26 | End: 2022-12-27

## 2022-12-26 RX ORDER — ONDANSETRON 8 MG/1
4 TABLET, FILM COATED ORAL ONCE
Refills: 0 | Status: DISCONTINUED | OUTPATIENT
Start: 2022-12-26 | End: 2022-12-26

## 2022-12-26 RX ORDER — SENNA PLUS 8.6 MG/1
2 TABLET ORAL AT BEDTIME
Refills: 0 | Status: DISCONTINUED | OUTPATIENT
Start: 2022-12-26 | End: 2022-12-28

## 2022-12-26 RX ORDER — HEPARIN SODIUM 5000 [USP'U]/ML
3500 INJECTION INTRAVENOUS; SUBCUTANEOUS ONCE
Refills: 0 | Status: COMPLETED | OUTPATIENT
Start: 2022-12-26 | End: 2022-12-26

## 2022-12-26 RX ORDER — ACETAMINOPHEN 500 MG
650 TABLET ORAL EVERY 6 HOURS
Refills: 0 | Status: DISCONTINUED | OUTPATIENT
Start: 2022-12-26 | End: 2022-12-28

## 2022-12-26 RX ORDER — HEPARIN SODIUM 5000 [USP'U]/ML
1500 INJECTION INTRAVENOUS; SUBCUTANEOUS EVERY 6 HOURS
Refills: 0 | Status: DISCONTINUED | OUTPATIENT
Start: 2022-12-26 | End: 2022-12-27

## 2022-12-26 RX ORDER — MIDODRINE HYDROCHLORIDE 2.5 MG/1
5 TABLET ORAL EVERY 8 HOURS
Refills: 0 | Status: DISCONTINUED | OUTPATIENT
Start: 2022-12-26 | End: 2022-12-28

## 2022-12-26 RX ORDER — PIPERACILLIN AND TAZOBACTAM 4; .5 G/20ML; G/20ML
3.38 INJECTION, POWDER, LYOPHILIZED, FOR SOLUTION INTRAVENOUS EVERY 8 HOURS
Refills: 0 | Status: DISCONTINUED | OUTPATIENT
Start: 2022-12-26 | End: 2022-12-28

## 2022-12-26 RX ORDER — HYDROMORPHONE HYDROCHLORIDE 2 MG/ML
0.25 INJECTION INTRAMUSCULAR; INTRAVENOUS; SUBCUTANEOUS
Refills: 0 | Status: DISCONTINUED | OUTPATIENT
Start: 2022-12-26 | End: 2022-12-26

## 2022-12-26 RX ORDER — HEPARIN SODIUM 5000 [USP'U]/ML
INJECTION INTRAVENOUS; SUBCUTANEOUS
Qty: 25000 | Refills: 0 | Status: DISCONTINUED | OUTPATIENT
Start: 2022-12-26 | End: 2022-12-27

## 2022-12-26 RX ORDER — PANTOPRAZOLE SODIUM 20 MG/1
40 TABLET, DELAYED RELEASE ORAL DAILY
Refills: 0 | Status: DISCONTINUED | OUTPATIENT
Start: 2022-12-26 | End: 2022-12-28

## 2022-12-26 RX ORDER — HYDROMORPHONE HYDROCHLORIDE 2 MG/ML
0.5 INJECTION INTRAMUSCULAR; INTRAVENOUS; SUBCUTANEOUS
Refills: 0 | Status: DISCONTINUED | OUTPATIENT
Start: 2022-12-26 | End: 2022-12-26

## 2022-12-26 RX ORDER — POLYETHYLENE GLYCOL 3350 17 G/17G
17 POWDER, FOR SOLUTION ORAL
Refills: 0 | Status: DISCONTINUED | OUTPATIENT
Start: 2022-12-26 | End: 2022-12-28

## 2022-12-26 RX ADMIN — PANTOPRAZOLE SODIUM 40 MILLIGRAM(S): 20 TABLET, DELAYED RELEASE ORAL at 12:30

## 2022-12-26 RX ADMIN — PIPERACILLIN AND TAZOBACTAM 25 GRAM(S): 4; .5 INJECTION, POWDER, LYOPHILIZED, FOR SOLUTION INTRAVENOUS at 22:18

## 2022-12-26 RX ADMIN — HEPARIN SODIUM 800 UNIT(S)/HR: 5000 INJECTION INTRAVENOUS; SUBCUTANEOUS at 19:35

## 2022-12-26 RX ADMIN — PIPERACILLIN AND TAZOBACTAM 25 GRAM(S): 4; .5 INJECTION, POWDER, LYOPHILIZED, FOR SOLUTION INTRAVENOUS at 15:25

## 2022-12-26 RX ADMIN — SENNA PLUS 2 TABLET(S): 8.6 TABLET ORAL at 22:16

## 2022-12-26 RX ADMIN — MIDODRINE HYDROCHLORIDE 5 MILLIGRAM(S): 2.5 TABLET ORAL at 22:17

## 2022-12-26 RX ADMIN — SODIUM CHLORIDE 125 MILLILITER(S): 9 INJECTION, SOLUTION INTRAVENOUS at 05:50

## 2022-12-26 RX ADMIN — MIDODRINE HYDROCHLORIDE 5 MILLIGRAM(S): 2.5 TABLET ORAL at 14:48

## 2022-12-26 RX ADMIN — Medication 81 MILLIGRAM(S): at 14:50

## 2022-12-26 RX ADMIN — HEPARIN SODIUM 900 UNIT(S)/HR: 5000 INJECTION INTRAVENOUS; SUBCUTANEOUS at 05:52

## 2022-12-26 RX ADMIN — POLYETHYLENE GLYCOL 3350 17 GRAM(S): 17 POWDER, FOR SOLUTION ORAL at 05:48

## 2022-12-26 RX ADMIN — POLYETHYLENE GLYCOL 3350 17 GRAM(S): 17 POWDER, FOR SOLUTION ORAL at 18:24

## 2022-12-26 RX ADMIN — HEPARIN SODIUM 800 UNIT(S)/HR: 5000 INJECTION INTRAVENOUS; SUBCUTANEOUS at 16:36

## 2022-12-26 RX ADMIN — HEPARIN SODIUM 3500 UNIT(S): 5000 INJECTION INTRAVENOUS; SUBCUTANEOUS at 16:31

## 2022-12-26 RX ADMIN — MIDODRINE HYDROCHLORIDE 5 MILLIGRAM(S): 2.5 TABLET ORAL at 05:47

## 2022-12-26 RX ADMIN — PIPERACILLIN AND TAZOBACTAM 25 GRAM(S): 4; .5 INJECTION, POWDER, LYOPHILIZED, FOR SOLUTION INTRAVENOUS at 05:48

## 2022-12-26 NOTE — PROGRESS NOTE ADULT - ASSESSMENT
87-year-old female brought in by EMS to the ED by her daughter for a decubitus ulcer.  Patient was also short of breath per EMS.  Per patient's daughter she had a small ulcer that began 6 months ago, which they were doing local wound care for, however she reports she has not seen the ulcer in 2 weeks, when she looked at it today she saw that it was large and very deep.  As per daughter, patient has been declining in health for the past year, last seen by pmd 18 months ago. Patient was ambulating with poor balance, became incontinent over 6 months ago. She developed and a sacral ulcer while wearing a diaper and being less mobile. She eventually became more sedentary, bedbound about  2 weeks ago. Sacral ulcer soon developed after that and was noticed by daughter as the size of a coin. Daughter noticed today the ulcer became very large. There was no reported fever, chills no chest pain no nausea vomiting.    1. Septic shock due to Large sacral decubiti with lactic acidosis  2. Right femoral vein DVT  3. Bilateral pulmonary emboli   4. Elevated troponin levels suspect from PE  5. Colon cancer  6. Severe Protein malnutrition with low hypoalbuminemia     - Continue current management  - Heparin infusion turned off this AM - preop  - Scheduled for sacral decubiti debridement in the OR  - Continue supplemental oxygen via nasal cannula  - monitor oxygen saturation, BP, heart rate  - Continue antibiotics, IVF hydration with lactated ringer's solution  - Continue Midodrine for borderline bp  - Monitor urine output, kidney function  - Tolerating diet well. Continue nutrition requirement post-operatively  - Family does not want vasopressors  - Repeat EKG, chest xray, ABG as needed  - Follow up cultures, AM labs  - GI and DVT prophylaxis  - Patient is at risk for sudden death, poor prognosis  - Palliative care evaluation pending  - Antibiotics as per ID - consult appreciated  - Patient is DNR/DNI with MOLST in chart  - Case d/w dtr 87-year-old female brought in by EMS to the ED by her daughter for a decubitus ulcer.  Patient was also short of breath per EMS.  Per patient's daughter she had a small ulcer that began 6 months ago, which they were doing local wound care for, however she reports she has not seen the ulcer in 2 weeks, when she looked at it today she saw that it was large and very deep.  As per daughter, patient has been declining in health for the past year, last seen by pmd 18 months ago. Patient was ambulating with poor balance, became incontinent over 6 months ago. She developed and a sacral ulcer while wearing a diaper and being less mobile. She eventually became more sedentary, bedbound about  2 weeks ago. Sacral ulcer soon developed after that and was noticed by daughter as the size of a coin. Daughter noticed today the ulcer became very large. There was no reported fever, chills no chest pain no nausea vomiting.    1. Septic shock due to Large sacral decubiti with lactic acidosis  2. Right femoral vein DVT  3. Bilateral pulmonary emboli   4. Elevated troponin levels suspect from PE  5. Colon cancer  6. Severe Protein malnutrition with low hypoalbuminemia     - Continue current management  - Heparin infusion turned off this AM - preop, will restart post op   - Scheduled for sacral decubiti debridement in the OR  - Continue supplemental oxygen via nasal cannula  - monitor oxygen saturation, BP, heart rate  - Continue antibiotics, IVF hydration with lactated ringer's solution  - Continue Midodrine for borderline bp  - Monitor urine output, kidney function  - Tolerating diet well. Continue nutrition requirement post-operatively  - Family does not want vasopressors  - Repeat EKG, chest xray, ABG as needed  - Follow up cultures, AM labs  - GI and DVT prophylaxis  - Patient is at risk for sudden death, poor prognosis  - Palliative care evaluation pending  - Antibiotics as per ID - consult appreciated  - Patient is DNR/DNI with MOLST in chart  - Case d/w dtr

## 2022-12-26 NOTE — PROGRESS NOTE ADULT - SUBJECTIVE AND OBJECTIVE BOX
Patient is a 87y old  Female who presents with a chief complaint of decubitus ulcer (26 Dec 2022 08:15)      Events last 24 hours:   Patient remains afebrile, tolerating n/c,  no events overnight. She is scheduled for sacral debridement this morning. NPO overnight, heparin infusion turned off. She has no complaints.       PAST MEDICAL & SURGICAL HISTORY:  Colon cancer  Dementia  S/P colon resection      Medications:  piperacillin/tazobactam IVPB.. 3.375 Gram(s) IV Intermittent every 8 hours  midodrine 5 milliGRAM(s) Oral every 8 hours  acetaminophen     Tablet .. 650 milliGRAM(s) Oral every 6 hours PRN  aspirin  chewable 81 milliGRAM(s) Oral daily  pantoprazole  Injectable 40 milliGRAM(s) IV Push daily  polyethylene glycol 3350 17 Gram(s) Oral two times a day  senna 2 Tablet(s) Oral at bedtime  lactated ringers. 1000 milliLiter(s) IV Continuous <Continuous>  collagenase Ointment 1 Application(s) Topical daily            ICU Vital Signs Last 24 Hrs  T(C): 36.6 (26 Dec 2022 08:23), Max: 36.7 (25 Dec 2022 14:32)  T(F): 97.8 (26 Dec 2022 08:23), Max: 98 (25 Dec 2022 14:32)  HR: 69 (26 Dec 2022 08:23) (65 - 80)  BP: 113/86 (26 Dec 2022 08:23) (91/50 - 116/69)  BP(mean): --  ABP: --  ABP(mean): --  RR: 21 (26 Dec 2022 08:23) (16 - 21)  SpO2: 100% (26 Dec 2022 08:23) (100% - 100%)    O2 Parameters below as of 26 Dec 2022 05:26  Patient On (Oxygen Delivery Method): nasal cannula  O2 Flow (L/min): 2      ABG - ( 24 Dec 2022 14:45 )  pH, Arterial: 7.49  pH, Blood: x     /  pCO2: 27    /  pO2: 289   / HCO3: 21    / Base Excess: -2.7  /  SaO2: 99.2            I&O's Detail    25 Dec 2022 07:01  -  26 Dec 2022 07:00  --------------------------------------------------------  IN:    Heparin Infusion: 84 mL    IV PiggyBack: 100 mL    Lactated Ringers: 1500 mL    Oral Fluid: 250 mL  Total IN: 1934 mL    OUT:    Voided (mL): 530 mL  Total OUT: 530 mL    Total NET: 1404 mL            LABS:                        9.1    10.13 )-----------( 199      ( 26 Dec 2022 06:15 )             28.3     12-25    135  |  102  |  22  ----------------------------<  99  3.6   |  24  |  0.82    Ca    7.4<L>      25 Dec 2022 06:28  Phos  3.7     12  Mg     1.9         TPro  4.7<L>  /  Alb  1.3<L>  /  TBili  0.5  /  DBili  x   /  AST  41<H>  /  ALT  33  /  AlkPhos  142<H>  1225      CARDIAC MARKERS ( 24 Dec 2022 16:48 )  x     / x     / 218 U/L / x     / 5.3 ng/mL      CAPILLARY BLOOD GLUCOSE        PT/INR - ( 26 Dec 2022 06:15 )   PT: 16.3 sec;   INR: 1.40 ratio         PTT - ( 26 Dec 2022 06:15 )  PTT:31.9 sec  Urinalysis Basic - ( 24 Dec 2022 14:42 )    Color: Yellow / Appearance: Clear / S.015 / pH: x  Gluc: x / Ketone: Negative  / Bili: Small / Urobili: 4   Blood: x / Protein: 30 mg/dL / Nitrite: Negative   Leuk Esterase: Trace / RBC: 26-50 /HPF / WBC 3-5 /HPF   Sq Epi: x / Non Sq Epi: Neg.-Few / Bacteria: Negative /HPF      CULTURES:  Culture Results:   No growth (22 @ 14:42)  Rapid RVP Result: NotDetec (22 @ 11:47)  Culture Results:   Growth in anaerobic bottle: Gram Positive Cocci in Pairs and Chains  Growth in aerobic bottle: Gram Positive Cocci in Pairs and Chains  ***Blood Panel PCR results on this specimen are available  approximately 3 hours after the Gram stain result.***  Gram stain, PCR, and/or culture results may not always  correspond due to difference in methodologies.  ************************************************************  This PCR assay was performed by multiplex PCR. This  Assay tests for 66 bacterial and resistance gene targets.  Please refer to the [facility] [employer] test directory  at [email or web address] for details. (22 @ 11:34)  Culture Results:   Growth in anaerobic bottle: Gram positive cocci in pairs and Gram  Negative Rods  ***Blood Panel PCR results on this specimen are available  approximately 3 hours after the Gram stain result.***  Gram stain, PCR, and/or culture results may not always  correspond due to difference in methodologies.  ************************************************************  This PCR assay was performed by multiplex PCR. This  Assay tests for 66 bacterial and resistance gene targets.  Please refer to the [facility] [employer] test directory  at [email or web address] for details. (22 @ 11:34)      Physical Examination:    General: Awake with dyspnea on exertion.  Alert, oriented, interactive, minimally verbal.    HEENT: Pupils equal, reactive to light.  Symmetric.    PULM: Diminished to auscultation bilaterally, no significant sputum production    CVS: Regular rate and rhythm, no murmurs, rubs, or gallops    ABD: Soft, nondistended, nontender, normoactive bowel sounds, no masses    EXT: 2+ edema b/l LE. no cyanosis, nontender        CRITICAL CARE TIME SPENT:  minutes of critical care time spent providing medical care for patient's acute illness/conditions that impairs at least one vital organ system and/or poses a high risk of imminent or life threatening deterioration in the patient's condition. It includes time spent evaluating and treating the patient's acute illness as well as time spent reviewing labs, radiology, discussing goals of care with patient and/or patient's family, and discussing the case with a multidisciplinary team, including the eICU, in an effort to prevent further life threatening deterioration or end organ damage. This time is independent of any procedures performed.

## 2022-12-26 NOTE — PROGRESS NOTE ADULT - ASSESSMENT
Full note pending 86 yo F hx colon CA, dementia, bedbound, necrotizing decubitus ulcer with extension to gluteal area and some epidural air at level of sacrum as well with growth of strep species and bacteroides from the blood.   She also has pe and dvt.   Awaiting surgery today for source control.   Recommendations:  continue zosyn  await surgical intervention today   Will f/u blood and urine cultures

## 2022-12-27 ENCOUNTER — TRANSCRIPTION ENCOUNTER (OUTPATIENT)
Age: 87
End: 2022-12-27

## 2022-12-27 LAB
ANION GAP SERPL CALC-SCNC: 7 MMOL/L — SIGNIFICANT CHANGE UP (ref 5–17)
APTT BLD: 127 SEC — CRITICAL HIGH (ref 27.5–35.5)
APTT BLD: 129.8 SEC — CRITICAL HIGH (ref 27.5–35.5)
APTT BLD: 175.5 SEC — CRITICAL HIGH (ref 27.5–35.5)
APTT BLD: 32 SEC — SIGNIFICANT CHANGE UP (ref 27.5–35.5)
APTT BLD: 69.5 SEC — HIGH (ref 27.5–35.5)
BUN SERPL-MCNC: 25 MG/DL — HIGH (ref 7–23)
CALCIUM SERPL-MCNC: 7.7 MG/DL — LOW (ref 8.4–10.5)
CHLORIDE SERPL-SCNC: 103 MMOL/L — SIGNIFICANT CHANGE UP (ref 96–108)
CO2 SERPL-SCNC: 25 MMOL/L — SIGNIFICANT CHANGE UP (ref 22–31)
CREAT SERPL-MCNC: 1.03 MG/DL — SIGNIFICANT CHANGE UP (ref 0.5–1.3)
EGFR: 53 ML/MIN/1.73M2 — LOW
GLUCOSE SERPL-MCNC: 133 MG/DL — HIGH (ref 70–99)
HCT VFR BLD CALC: 24 % — LOW (ref 34.5–45)
HCT VFR BLD CALC: 24.9 % — LOW (ref 34.5–45)
HCT VFR BLD CALC: 28 % — LOW (ref 34.5–45)
HCT VFR BLD CALC: 33 % — LOW (ref 34.5–45)
HGB BLD-MCNC: 10.9 G/DL — LOW (ref 11.5–15.5)
HGB BLD-MCNC: 7.9 G/DL — LOW (ref 11.5–15.5)
HGB BLD-MCNC: 8.5 G/DL — LOW (ref 11.5–15.5)
HGB BLD-MCNC: 9.3 G/DL — LOW (ref 11.5–15.5)
INR BLD: 1.19 RATIO — HIGH (ref 0.88–1.16)
INR BLD: 1.37 RATIO — HIGH (ref 0.88–1.16)
INR BLD: 1.42 RATIO — HIGH (ref 0.88–1.16)
MAGNESIUM SERPL-MCNC: 1.8 MG/DL — SIGNIFICANT CHANGE UP (ref 1.6–2.6)
MCHC RBC-ENTMCNC: 27.9 PG — SIGNIFICANT CHANGE UP (ref 27–34)
MCHC RBC-ENTMCNC: 28.7 PG — SIGNIFICANT CHANGE UP (ref 27–34)
MCHC RBC-ENTMCNC: 29.6 PG — SIGNIFICANT CHANGE UP (ref 27–34)
MCHC RBC-ENTMCNC: 30.1 PG — SIGNIFICANT CHANGE UP (ref 27–34)
MCHC RBC-ENTMCNC: 32.9 GM/DL — SIGNIFICANT CHANGE UP (ref 32–36)
MCHC RBC-ENTMCNC: 33 GM/DL — SIGNIFICANT CHANGE UP (ref 32–36)
MCHC RBC-ENTMCNC: 33.2 GM/DL — SIGNIFICANT CHANGE UP (ref 32–36)
MCHC RBC-ENTMCNC: 34.1 GM/DL — SIGNIFICANT CHANGE UP (ref 32–36)
MCV RBC AUTO: 84.1 FL — SIGNIFICANT CHANGE UP (ref 80–100)
MCV RBC AUTO: 84.8 FL — SIGNIFICANT CHANGE UP (ref 80–100)
MCV RBC AUTO: 89.7 FL — SIGNIFICANT CHANGE UP (ref 80–100)
MCV RBC AUTO: 90.6 FL — SIGNIFICANT CHANGE UP (ref 80–100)
NRBC # BLD: 0 /100 WBCS — SIGNIFICANT CHANGE UP (ref 0–0)
PHOSPHATE SERPL-MCNC: 4 MG/DL — SIGNIFICANT CHANGE UP (ref 2.5–4.5)
PLATELET # BLD AUTO: 138 K/UL — LOW (ref 150–400)
PLATELET # BLD AUTO: 174 K/UL — SIGNIFICANT CHANGE UP (ref 150–400)
PLATELET # BLD AUTO: 204 K/UL — SIGNIFICANT CHANGE UP (ref 150–400)
PLATELET # BLD AUTO: 218 K/UL — SIGNIFICANT CHANGE UP (ref 150–400)
POTASSIUM SERPL-MCNC: 4.2 MMOL/L — SIGNIFICANT CHANGE UP (ref 3.5–5.3)
POTASSIUM SERPL-SCNC: 4.2 MMOL/L — SIGNIFICANT CHANGE UP (ref 3.5–5.3)
PROTHROM AB SERPL-ACNC: 13.8 SEC — HIGH (ref 10.5–13.4)
PROTHROM AB SERPL-ACNC: 16 SEC — HIGH (ref 10.5–13.4)
PROTHROM AB SERPL-ACNC: 16.5 SEC — HIGH (ref 10.5–13.4)
RBC # BLD: 2.83 M/UL — LOW (ref 3.8–5.2)
RBC # BLD: 2.96 M/UL — LOW (ref 3.8–5.2)
RBC # BLD: 3.09 M/UL — LOW (ref 3.8–5.2)
RBC # BLD: 3.68 M/UL — LOW (ref 3.8–5.2)
RBC # FLD: 13.2 % — SIGNIFICANT CHANGE UP (ref 10.3–14.5)
RBC # FLD: 13.3 % — SIGNIFICANT CHANGE UP (ref 10.3–14.5)
RBC # FLD: 13.8 % — SIGNIFICANT CHANGE UP (ref 10.3–14.5)
RBC # FLD: 13.8 % — SIGNIFICANT CHANGE UP (ref 10.3–14.5)
SODIUM SERPL-SCNC: 135 MMOL/L — SIGNIFICANT CHANGE UP (ref 135–145)
WBC # BLD: 12.78 K/UL — HIGH (ref 3.8–10.5)
WBC # BLD: 12.98 K/UL — HIGH (ref 3.8–10.5)
WBC # BLD: 13.02 K/UL — HIGH (ref 3.8–10.5)
WBC # BLD: 14.58 K/UL — HIGH (ref 3.8–10.5)
WBC # FLD AUTO: 12.78 K/UL — HIGH (ref 3.8–10.5)
WBC # FLD AUTO: 12.98 K/UL — HIGH (ref 3.8–10.5)
WBC # FLD AUTO: 13.02 K/UL — HIGH (ref 3.8–10.5)
WBC # FLD AUTO: 14.58 K/UL — HIGH (ref 3.8–10.5)

## 2022-12-27 PROCEDURE — 99497 ADVNCD CARE PLAN 30 MIN: CPT | Mod: 25

## 2022-12-27 PROCEDURE — 99233 SBSQ HOSP IP/OBS HIGH 50: CPT | Mod: FS

## 2022-12-27 PROCEDURE — 99223 1ST HOSP IP/OBS HIGH 75: CPT

## 2022-12-27 DEVICE — ARISTA 3GR: Type: IMPLANTABLE DEVICE | Status: FUNCTIONAL

## 2022-12-27 DEVICE — BONE WAX 2.5GM: Type: IMPLANTABLE DEVICE | Status: FUNCTIONAL

## 2022-12-27 DEVICE — CYTAL WOUND MATRIX 6-LAYER 10X15CM: Type: IMPLANTABLE DEVICE | Status: FUNCTIONAL

## 2022-12-27 RX ORDER — HEPARIN SODIUM 5000 [USP'U]/ML
3500 INJECTION INTRAVENOUS; SUBCUTANEOUS EVERY 6 HOURS
Refills: 0 | Status: DISCONTINUED | OUTPATIENT
Start: 2022-12-27 | End: 2022-12-28

## 2022-12-27 RX ORDER — HEPARIN SODIUM 5000 [USP'U]/ML
1500 INJECTION INTRAVENOUS; SUBCUTANEOUS EVERY 6 HOURS
Refills: 0 | Status: DISCONTINUED | OUTPATIENT
Start: 2022-12-27 | End: 2022-12-28

## 2022-12-27 RX ORDER — HEPARIN SODIUM 5000 [USP'U]/ML
INJECTION INTRAVENOUS; SUBCUTANEOUS
Qty: 25000 | Refills: 0 | Status: DISCONTINUED | OUTPATIENT
Start: 2022-12-27 | End: 2022-12-28

## 2022-12-27 RX ORDER — BRIMONIDINE TARTRATE 2 MG/MG
1 SOLUTION/ DROPS OPHTHALMIC
Refills: 0 | Status: DISCONTINUED | OUTPATIENT
Start: 2022-12-27 | End: 2022-12-28

## 2022-12-27 RX ORDER — MORPHINE SULFATE 50 MG/1
2 CAPSULE, EXTENDED RELEASE ORAL ONCE
Refills: 0 | Status: DISCONTINUED | OUTPATIENT
Start: 2022-12-27 | End: 2022-12-27

## 2022-12-27 RX ADMIN — PANTOPRAZOLE SODIUM 40 MILLIGRAM(S): 20 TABLET, DELAYED RELEASE ORAL at 11:15

## 2022-12-27 RX ADMIN — MIDODRINE HYDROCHLORIDE 5 MILLIGRAM(S): 2.5 TABLET ORAL at 21:34

## 2022-12-27 RX ADMIN — HEPARIN SODIUM 700 UNIT(S)/HR: 5000 INJECTION INTRAVENOUS; SUBCUTANEOUS at 00:26

## 2022-12-27 RX ADMIN — MORPHINE SULFATE 2 MILLIGRAM(S): 50 CAPSULE, EXTENDED RELEASE ORAL at 10:27

## 2022-12-27 RX ADMIN — PIPERACILLIN AND TAZOBACTAM 25 GRAM(S): 4; .5 INJECTION, POWDER, LYOPHILIZED, FOR SOLUTION INTRAVENOUS at 16:14

## 2022-12-27 RX ADMIN — Medication 81 MILLIGRAM(S): at 11:15

## 2022-12-27 RX ADMIN — MIDODRINE HYDROCHLORIDE 5 MILLIGRAM(S): 2.5 TABLET ORAL at 06:59

## 2022-12-27 RX ADMIN — MORPHINE SULFATE 2 MILLIGRAM(S): 50 CAPSULE, EXTENDED RELEASE ORAL at 09:27

## 2022-12-27 RX ADMIN — HEPARIN SODIUM 800 UNIT(S)/HR: 5000 INJECTION INTRAVENOUS; SUBCUTANEOUS at 13:40

## 2022-12-27 RX ADMIN — BRIMONIDINE TARTRATE 1 DROP(S): 2 SOLUTION/ DROPS OPHTHALMIC at 18:23

## 2022-12-27 RX ADMIN — HEPARIN SODIUM 0 UNIT(S)/HR: 5000 INJECTION INTRAVENOUS; SUBCUTANEOUS at 20:57

## 2022-12-27 RX ADMIN — PIPERACILLIN AND TAZOBACTAM 25 GRAM(S): 4; .5 INJECTION, POWDER, LYOPHILIZED, FOR SOLUTION INTRAVENOUS at 21:38

## 2022-12-27 RX ADMIN — MIDODRINE HYDROCHLORIDE 5 MILLIGRAM(S): 2.5 TABLET ORAL at 14:22

## 2022-12-27 RX ADMIN — HEPARIN SODIUM 700 UNIT(S)/HR: 5000 INJECTION INTRAVENOUS; SUBCUTANEOUS at 21:58

## 2022-12-27 RX ADMIN — PIPERACILLIN AND TAZOBACTAM 25 GRAM(S): 4; .5 INJECTION, POWDER, LYOPHILIZED, FOR SOLUTION INTRAVENOUS at 06:57

## 2022-12-27 RX ADMIN — SENNA PLUS 2 TABLET(S): 8.6 TABLET ORAL at 21:34

## 2022-12-27 NOTE — CONSULT NOTE ADULT - SUBJECTIVE AND OBJECTIVE BOX
HPI:   Patient is a 87y female with h/o colon ca s/p resection, dementia, has become bedridden over the past months. She was noted by report to have a coin sized decubitus over the sacrum as of 2 weeks ago but then yesterday her daughter noted it to be much bigger and patient has gotten much weaker hence took her to the hospital. She was found to have the decube with extension to the gluteal area and there is some air in the epidural area as well. I cannot get any information from the patient . She also is now known to have dvt and pe.   Blood is growing strept species and bacteroides  REVIEW OF SYSTEMS:  All other review of systems negative (Comprehensive ROS)cannot get    PAST MEDICAL & SURGICAL HISTORY:  Colon cancer      Dementia      S/P colon resection          Allergies    No Known Allergies    Intolerances        Antimicrobials Day #  :  piperacillin/tazobactam IVPB.. 3.375 Gram(s) IV Intermittent every 8 hours  vancomycin  IVPB 750 milliGRAM(s) IV Intermittent every 12 hours    Other Medications:  acetaminophen     Tablet .. 650 milliGRAM(s) Oral every 6 hours PRN  aspirin  chewable 81 milliGRAM(s) Oral daily  collagenase Ointment 1 Application(s) Topical daily  heparin  Infusion.  Unit(s)/Hr IV Continuous <Continuous>  lactated ringers. 1000 milliLiter(s) IV Continuous <Continuous>  midodrine 5 milliGRAM(s) Oral every 8 hours  pantoprazole  Injectable 40 milliGRAM(s) IV Push daily  polyethylene glycol 3350 17 Gram(s) Oral two times a day  senna 2 Tablet(s) Oral at bedtime      FAMILY HISTORY:    cannot get  SOCIAL HISTORY:  Smoking: [ ]Yes [ ]xNo  ETOH: [ ]Yes [x ]No  Drug Use: [ ]Yes [ x]No  Marriedx [ ] Single[ ]    T(F): 98 (22 @ 14:32), Max: 99.8 (22 @ 22:37)  HR: 80 (22 @ 14:32)  BP: 116/69 (22 @ 14:32)  RR: 20 (22 @ 14:32)  SpO2: 100% (22 @ 14:32)  Wt(kg): --    PHYSICAL EXAM:  General: alert,in fetal position no acute distress  Eyes:  anicteric, no conjunctival injection, no discharge  Oropharynx: no lesions or injection 	  Neck: supple, without adenopathy  Lungs: clear to auscultation  Heart: regular rate and rhythm; no murmur, rubs or gallops  Abdomen: soft, nondistended, nontender, without mass or organomegaly  Skin: no lesions  Extremities: no clubbing, cyanosis, / Legs with  edema  Neurologic: alert, not moving much  back with necrotic decube, grossly purulent discharge, malodorous, some edematous changes over the left gluteal area.   LAB RESULTS:                        8.9    9.22  )-----------( 193      ( 25 Dec 2022 06:30 )             27.7     12-    135  |  102  |  22  ----------------------------<  99  3.6   |  24  |  0.82    Ca    7.4<L>      25 Dec 2022 06:28  Phos  3.7       Mg     1.9         TPro  4.7<L>  /  Alb  1.3<L>  /  TBili  0.5  /  DBili  x   /  AST  41<H>  /  ALT  33  /  AlkPhos  142<H>  -25    LIVER FUNCTIONS - ( 25 Dec 2022 06:28 )  Alb: 1.3 g/dL / Pro: 4.7 g/dL / ALK PHOS: 142 U/L / ALT: 33 U/L / AST: 41 U/L / GGT: x           Urinalysis Basic - ( 24 Dec 2022 14:42 )    Color: Yellow / Appearance: Clear / S.015 / pH: x  Gluc: x / Ketone: Negative  / Bili: Small / Urobili: 4   Blood: x / Protein: 30 mg/dL / Nitrite: Negative   Leuk Esterase: Trace / RBC: 26-50 /HPF / WBC 3-5 /HPF   Sq Epi: x / Non Sq Epi: Neg.-Few / Bacteria: Negative /HPF        MICROBIOLOGY:  RECENT CULTURES:   @ 11:34 .Blood Blood-Peripheral Blood Culture PCR    Growth in anaerobic bottle: Gram Positive Cocci in Pairs and Chains  ***Blood Panel PCR results on this specimen are available  approximately 3 hours after the Gram stain result.***  Gram stain, PCR, and/or culture results may not always  correspond due to difference in methodologies.  ************************************************************  This PCR assay was performed by multiplex PCR. This  Assay tests for 66 bacterial and resistance gene targets.  Please refer to the [facility] test directory  at [email or web address] for details.    Growth in anaerobic bottle: Gram Positive Cocci in Pairs and Chains          RADIOLOGY REVIEWED:    < from: CT Abdomen and Pelvis w/ IV Cont (22 @ 13:58) >    ACC: [id number] EXAM:  CT ABDOMEN AND PELVIS IC                        ACC: [id number] EXAM:  CT CHEST IC                          PROCEDURE DATE:  2022          INTERPRETATION:  CLINICAL INFORMATION: Shortness of breath, evaluate for   DVT. Fever, assess for sacral decubitus ulceration.    COMPARISON: None.    CONTRAST/COMPLICATIONS:  IV Contrast: Omnipaque 350 (accession [id number]), IV contrast documented   in unlinked concurrent exam (accession [id number])  90 cc administered   10   cc discarded  Oral Contrast: NONE  Complications: None reported at time of study completion    PROCEDURE:  CT Angiography of the Chest was performed followed by portal venous phase   imaging of the Abdomen and Pelvis.  Sagittal and coronal reformats were performed as well as 3D (MIP)   reconstructions.    FINDINGS:  CHEST:  LUNGS AND LARGE AIRWAYS: Patent central airways. Opacity within the   inferior/posterior aspect of the right lower lobe may represent   atelectasis versus infiltrate.  PLEURA: Small bilateral pleural effusions.  VESSELS: Bilateral pulmonary emboli identified.  HEART: Heart size is normal. No pericardial effusion.  MEDIASTINUM AND SOLITARIO: No lymphadenopathy.  CHEST WALL AND LOWER NECK: Hypodense nodules measuring up to 7 mm   identified within the visualized portion of the thyroid parenchyma.    ABDOMEN AND PELVIS:  LIVER: Within normal limits.  BILE DUCTS: Normal caliber.  GALLBLADDER: Not visualized.  SPLEEN: Within normal limits.  PANCREAS: Within normal limits.  ADRENALS: Within normal limits.  KIDNEYS/URETERS: No hydronephrosis. No renal calculi. Subcentimeter   hypodense foci are noted within the bilateral renal parenchyma, too small   to characterize.    BLADDER: Within normal limits.  REPRODUCTIVE ORGANS: Uterus and adnexa within normal limits.    BOWEL: Abundant fecal material scattered throughout the colon and rectum.   An element of fecal rectal impaction cannot be fully excluded. Appendix   is not visualized.  PERITONEUM: No ascites.  VESSELS: Thrombus is identified within the right femoral vein as well as   deep femoral vein consistent with DVT.  RETROPERITONEUM/LYMPH NODES: No lymphadenopathy.  ABDOMINAL WALL: There is a marginally enhancing air/fluid collection,   which may be multiloculated measuring 4.5x 2.0 cm within the left   gluteal musculature. Subcutaneous air is identified overlying the   inferior sacrum and medial right gluteal region. Diffuse anasarca.  BONES: Droplets of epidural air are identified at the level of the sacral   canal; the patient is status post laminectomy at L4-L5. Differential   considerations of the epidural air would include extension of the sacral   decubitus infection versus secondary to degenerative change.    IMPRESSION:  1. Bilateral pulmonary emboli. Thrombusis identified within the right   femoral vein as well as deep femoral vein consistent with DVT.  2. There is a marginally enhancing air/fluid collection, which may be   multiloculated measuring 4.5 x 2.0 cm within the left gluteal   musculature. Subcutaneous air is identified overlying the inferior sacrum   and medial right gluteal region.  3. Droplets of epidural air are identified at the level of the sacral   canal; the patient is status post laminectomy at L4-L5. Differential   considerations of the epidural air would include extension of the sacral   decubitus infection versus secondary to degenerative change.      Findings were discussed with Dr. GETACHEW MAYORGA [phone] 2022   2:44 PM by Dr. Maloney with read back confirmation.    --- End of Report ---      < end of copied text >              < from: CT Chest w/ IV Cont (22 @ 13:58) >    ACC: [id number] EXAM:  CT ABDOMEN AND PELVIS IC                        ACC: [id number] EXAM:  CT CHEST IC                          PROCEDURE DATE:  2022          INTERPRETATION:  CLINICAL INFORMATION: Shortness of breath, evaluate for   DVT. Fever, assess for sacral decubitus ulceration.    COMPARISON: None.    CONTRAST/COMPLICATIONS:  IV Contrast: Omnipaque 350 (accession [id number]), IV contrast documented   in unlinked concurrent exam (accession [id number])  90 cc administered   10   cc discarded  Oral Contrast: NONE  Complications: None reported at time of study completion    PROCEDURE:  CT Angiography of the Chest was performed followed by portal venous phase   imaging of the Abdomen and Pelvis.  Sagittal and coronal reformats were performed as well as 3D (MIP)   reconstructions.    FINDINGS:  CHEST:  LUNGS AND LARGE AIRWAYS: Patent central airways. Opacity within the   inferior/posterior aspect of the right lower lobe may represent   atelectasis versus infiltrate.  PLEURA: Small bilateral pleural effusions.  VESSELS: Bilateral pulmonary emboli identified.  HEART: Heart size is normal. No pericardial effusion.  MEDIASTINUM AND SOLITARIO: No lymphadenopathy.  CHEST WALL AND LOWER NECK: Hypodense nodules measuring up to 7 mm   identified within the visualized portion of the thyroid parenchyma.    ABDOMEN AND PELVIS:  LIVER: Within normal limits.  BILE DUCTS: Normal caliber.  GALLBLADDER: Not visualized.  SPLEEN: Within normal limits.  PANCREAS: Within normal limits.  ADRENALS: Within normal limits.  KIDNEYS/URETERS: No hydronephrosis. No renal calculi. Subcentimeter   hypodense foci are noted within the bilateral renal parenchyma, too small   to characterize.    BLADDER: Within normal limits.  REPRODUCTIVE ORGANS: Uterus and adnexa within normal limits.    BOWEL: Abundant fecal material scattered throughout the colon and rectum.   An element of fecal rectal impaction cannot be fully excluded. Appendix   is not visualized.  PERITONEUM: No ascites.  VESSELS: Thrombus is identified within the right femoral vein as well as   deep femoral vein consistent with DVT.  RETROPERITONEUM/LYMPH NODES: No lymphadenopathy.  ABDOMINAL WALL: There is a marginally enhancing air/fluid collection,   which may be multiloculated measuring 4.5x 2.0 cm within the left   gluteal musculature. Subcutaneous air is identified overlying the   inferior sacrum and medial right gluteal region. Diffuse anasarca.  BONES: Droplets of epidural air are identified at the level of the sacral   canal; the patient is status post laminectomy at L4-L5. Differential   considerations of the epidural air would include extension of the sacral   decubitus infection versus secondary to degenerative change.    IMPRESSION:  1. Bilateral pulmonary emboli. Thrombusis identified within the right   femoral vein as well as deep femoral vein consistent with DVT.  2. There is a marginally enhancing air/fluid collection, which may be   multiloculated measuring 4.5 x 2.0 cm within the left gluteal   musculature. Subcutaneous air is identified overlying the inferior sacrum   and medial right gluteal region.  3. Droplets of epidural air are identified at the level of the sacral   canal; the patient is status post laminectomy at L4-L5. Differential   considerations of the epidural air would include extension of the sacral   decubitus infection versus secondary to degenerative change.      Findings were discussed with Dr. GETACHEW MAYORGA [phone] 2022   2:44 PM by Dr. Maloney with read back confirmation.    --- End of Report ---            < end of copied text >    Impression:   Patient with dementia, bedbound of late, necrotizing decubitus ulcer with extension to gluteal area and some epidural air at level of sacrum as well with growth of strept species and bacteroides from the blood. She also has pe and dvt. She is planned for surgery as most critical intervention to control the necrotizing infection once she is medically stable in light of having pe and dvt.   Recommendations:  continue zosyn, hold further vanco  await surgical intervention  await further culture results
Bedbound Pt presents from home, where she lives with her daughter, for appearing sicker, sob, and worsening know sacral decubitus ulcer.  Workup demonstrates leukocytosis, Ct with sacral decub and tracks into left gluteal fold.  Abx are started.  PAST MEDICAL & SURGICAL HISTORY:  sacral decubitus ulcer,   bedbound    MEDICATIONS  (STANDING):  heparin  Infusion.  Unit(s)/Hr (8 mL/Hr) IV Continuous <Continuous>    MEDICATIONS  (PRN):      Allergies    No Known Allergies    Intolerances        Physical Exam:  General: NAD, resting comfortably  HEENT: NC/AT, EOMI, normal hearing, no oral lesions, no LAD, neck supple  Pulmonary: CTA B/L  Cardiovascular: S1, S2 w/RRR  Abdominal: soft, ND/NT  Extremities:  no clubbing/cyanosis/edema  Neuro: A/O x 3  Back/gluteus: 10 cm sacral decub ulcer, bone exposed, with surrounding eschar and erythema, with nonviable tissue immediately in the wound and adjacent to it.  Draining pus when expressed from the left gluteus.      Vital Signs Last 24 Hrs  T(C): 36.7 (24 Dec 2022 11:55), Max: 36.7 (24 Dec 2022 11:55)  T(F): 98.1 (24 Dec 2022 11:55), Max: 98.1 (24 Dec 2022 11:55)  HR: 110 (24 Dec 2022 14:46) (110 - 123)  BP: 101/57 (24 Dec 2022 14:46) (80/45 - 144/90)  BP(mean): 93 (24 Dec 2022 11:55) (93 - 93)  RR: 31 (24 Dec 2022 14:46) (18 - 31)  SpO2: 95% (24 Dec 2022 14:46) (93% - 95%)    Parameters below as of 24 Dec 2022 14:46  Patient On (Oxygen Delivery Method): mask, nonrebreather  O2 Flow (L/min): 8      LABS:                        11.6   12.44 )-----------( 241      ( 24 Dec 2022 11:34 )             36.8     12-24    137  |  103  |  27<H>  ----------------------------<  216<H>  5.5<H>   |  23  |  1.03    Ca    8.2<L>      24 Dec 2022 11:34    TPro  5.6<L>  /  Alb  1.5<L>  /  TBili  0.7  /  DBili  x   /  AST  63<H>  /  ALT  59<H>  /  AlkPhos  149<H>  12-24    PT/INR - ( 24 Dec 2022 11:34 )   PT: 15.6 sec;   INR: 1.34 ratio         PTT - ( 24 Dec 2022 11:34 )  PTT:30.2 sec  Urinalysis Basic - ( 24 Dec 2022 14:42 )    Color: Yellow / Appearance: Clear / S.015 / pH: x  Gluc: x / Ketone: Negative  / Bili: Small / Urobili: 4   Blood: x / Protein: 30 mg/dL / Nitrite: Negative   Leuk Esterase: Trace / RBC: 26-50 /HPF / WBC 3-5 /HPF   Sq Epi: x / Non Sq Epi: Neg.-Few / Bacteria: Negative /HPF    CAPILLARY BLOOD GLUCOSE        LIVER FUNCTIONS - ( 24 Dec 2022 11:34 )  Alb: 1.5 g/dL / Pro: 5.6 g/dL / ALK PHOS: 149 U/L / ALT: 59 U/L / AST: 63 U/L / GGT: x             Cultures:      RADIOLOGY & ADDITIONAL STUDIES:  IMPRESSION:  1. Bilateral pulmonary emboli. Thrombus is identified within the right   femoral vein as well as deep femoral vein consistent with DVT.  2. There is a marginally enhancing air/fluid collection, which may be   multiloculated measuring 4.5 x 2.0 cm within the left gluteal   musculature. Subcutaneous air is identified overlying the inferior sacrum   and medial right gluteal region.  3. Droplets of epidural air are identified at the level of the sacral   canal; the patient is status post laminectomy at L4-L5. Differential   considerations of the epidural air would include extension of the sacral   decubitus infection versus secondary to degenerative change.      Findings were discussed with Dr. GETACHEW MAYORGA [phone] 2022   2:44 PM by Dr. Odell with read back confirmation.    --- End of Report ---            ELENI ODELL MD; Attending Radiologist  This document has been electronically signed. Dec 24 2022  2:45PM      
CHIEF COMPLAINT:  Patient is a 87y old  Female who presents with a chief complaint of decubitus ulcer (25 Dec 2022 10:46)    HPI:  Patient is a 87y old  Female who presents with a chief complaint of sepsis (24 Dec 2022 15:38)    Source: Patient's daughter  Reliability: very good    CC: large sacral ulcer    HPI  This is an 87-year-old female  with h/o Dementia, colon cancer s/p resection brought in by EMS to the ED by her daughter for a decubitus ulcer and increasing weakness.  Patient was also short of breath per EMS.  Per patient's daughter she had a small ulcer that began 6 months ago, which they were doing local wound care for, however she reports she has not seen the ulcer in 2 weeks, when she looked at it today she saw that it was large and very deep.  As per daughter, patient has been declining in health for the past year, last seen by pmd 18 months ago. Patient was ambulating with poor balance, became incontinent over 6 months ago. She developed and a sacral ulcer while wearing a diaper and being less mobile. She eventually became more sedentary, bedbound about  2 weeks ago. Sacral ulcer soon developed after that and was noticed by daughter as the size of a coin. Daughter noticed today the ulcer became very large. There was no reported fever, chills no chest pain no nausea vomiting.    In the ED, patient was evaluated for respiratory distress and lower back tenderness. She was initiated on sepsis protocol, IVF resuscitation, started on IV antibiotics, blood cultures and lactic acid levels were obtained. A CT angiogram of chest and CT of abdomen/ pelvis were performed.  where showed Gluteal Abcess, b/l PE, Fem DVT and sacral decub    Critical care and general surgery were both consulted  Surgical consult called and seen patient and not acute surgical candidate     (24 Dec 2022 16:31)      As above. Seen at request of Dr. Workman pre op. Has PE and elevated tn,k evidence of MI by echo .  No report of chest pain dyspnea.      PMH:   Colon cancer    Dementia        PSH:   S/P colon resection      FAMILY HISTORY:      SOCIAL HISTORY:  Smoking:  Alcohol:  Drugs:    ALLERGIES:  No Known Allergies      Home Medications:      MEDICATIONS:  acetaminophen     Tablet .. 650 milliGRAM(s) Oral every 6 hours PRN  clindamycin IVPB      clindamycin IVPB 900 milliGRAM(s) IV Intermittent every 8 hours  collagenase Ointment 1 Application(s) Topical daily  heparin  Infusion.  Unit(s)/Hr IV Continuous <Continuous>  lactated ringers. 1000 milliLiter(s) IV Continuous <Continuous>  midodrine 5 milliGRAM(s) Oral every 8 hours  pantoprazole  Injectable 40 milliGRAM(s) IV Push daily  piperacillin/tazobactam IVPB.. 3.375 Gram(s) IV Intermittent every 8 hours  polyethylene glycol 3350 17 Gram(s) Oral two times a day  senna 2 Tablet(s) Oral at bedtime  vancomycin  IVPB 750 milliGRAM(s) IV Intermittent every 12 hours      REVIEW OF SYSTEMS:  not obtainable at this time    PHYSICAL EXAM:  T(C): 36.4 (22 @ 05:17), Max: 37.7 (22 @ 22:37)  HR: 81 (22 @ 05:17) (80 - 115)  BP: 127/66 (22 @ 05:17) (80/45 - 127/66)  RR: 18 (22 @ 05:17) (17 - 31)  SpO2: 100% (22 @ 05:17) (95% - 100%)  Wt(kg): --    GENERAL: NAD, well-groomed, well-developed  HEAD:  Atraumatic, Normocephalic  EYES: EOMI, conjunctiva and sclera clear  ENT: Moist mucous membranes,  NECK: Supple, No JVD, no bruits  CHEST/LUNG: Clear to ausculation and percussion bilaterally; No rales, rhonchi, wheezing, or rubs  HEART: Regular rate and rhythm; No murmurs, rubs, or gallops PMI non displaced.  ABDOMEN: Soft, Nontender, Nondistended; Bowel sounds present  EXTREMITIES:   flexed no edema  SKIN: No rashes or lesions  NERVOUS SYSTEM:  Alert     Cardiovascular Diagnostic Testing:  ECG:  < from: 12 Lead ECG (22 @ 16:11) >    Ventricular Rate 97 BPM    Atrial Rate 97 BPM    P-R Interval 132 ms    QRS Duration 64 ms    Q-T Interval 354 ms    QTC Calculation(Bazett) 449 ms    R Axis 238 degrees    T Axis -74 degrees    Diagnosis Line sinus or atrial rhythm  suspect incorrect lead placement  Right superior axis deviation  Low voltage QRS  Inferior infarct , age undetermined  Cannot rule out Anteroseptal infarct , age undetermined  Abnormal ECG  No previous ECGs available  please repeat  Confirmed by CAITIE CELIS, ALEX STRAUSS () on 2022 10:00:41 AM    < end of copied text >      LABS:                        8.9    9.22  )-----------( 193      ( 25 Dec 2022 06:30 )             27.7         135  |  102  |  22  ----------------------------<  99  3.6   |  24  |  0.82    Ca    7.4<L>      25 Dec 2022 06:28  Phos  3.7       Mg     1.9         TPro  4.7<L>  /  Alb  1.3<L>  /  TBili  0.5  /  DBili  x   /  AST  41<H>  /  ALT  33  /  AlkPhos  142<H>      PT/INR - ( 25 Dec 2022 06:30 )   PT: 17.3 sec;   INR: 1.49 ratio         PTT - ( 25 Dec 2022 06:30 )  PTT:62.2 sec  CARDIAC MARKERS ( 24 Dec 2022 16:48 )  x     / x     / 218 U/L / x     / 5.3 ng/mL      Serum Pro-Brain Natriuretic Peptide: 6427 pg/mL ( @ 06:28)  Serum Pro-Brain Natriuretic Peptide: 8237 pg/mL ( @ 11:34)    Total Cholesterol: 110  LDL: --  HDL: 37  T      Thyroid Stimulating Hormone, Serum: 2.410 uIU/mL ( @ 06:30)    Troponin I, High Sensitivity Result: 140.4 ng/L (22 @ 11:34)  Troponin I, High Sensitivity Result: 170.9 ng/L (22 @ 16:48)  Troponin I, High Sensitivity Result: 166.4 ng/L (22 @ 23:50)  Troponin I, High Sensitivity Result: 148.9 ng/L (22 @ 06:30)      Telemetry:   sinus    IMAGING:   < from: CT Chest w/ IV Cont (22 @ 13:58) >    ACC: [id number] EXAM:  CT ABDOMEN AND PELVIS IC                        ACC: [id number] EXAM:  CT CHEST IC                          PROCEDURE DATE:  2022          INTERPRETATION:  CLINICAL INFORMATION: Shortness of breath, evaluate for   DVT. Fever, assess for sacral decubitus ulceration.    COMPARISON: None.    CONTRAST/COMPLICATIONS:  IV Contrast: Omnipaque 350 (accession [id number]), IV contrast documented   in unlinked concurrent exam (accession [id number])  90 cc administered   10   cc discarded  Oral Contrast: NONE  Complications: None reported at time of study completion    PROCEDURE:  CT Angiography of the Chest was performed followed by portal venous phase   imaging of the Abdomen and Pelvis.  Sagittal and coronal reformats were performed as well as 3D (MIP)   reconstructions.    FINDINGS:  CHEST:  LUNGS AND LARGE AIRWAYS: Patent central airways. Opacity within the   inferior/posterior aspect of the right lower lobe may represent   atelectasis versus infiltrate.  PLEURA: Small bilateral pleural effusions.  VESSELS: Bilateral pulmonary emboli identified.  HEART: Heart size is normal. No pericardial effusion.  MEDIASTINUM AND SOLITARIO: No lymphadenopathy.  CHEST WALL AND LOWER NECK: Hypodense nodules measuring up to 7 mm   identified within the visualized portion of the thyroid parenchyma.    ABDOMEN AND PELVIS:  LIVER: Within normal limits.  BILE DUCTS: Normal caliber.  GALLBLADDER: Not visualized.  SPLEEN: Within normal limits.  PANCREAS: Within normal limits.  ADRENALS: Within normal limits.  KIDNEYS/URETERS: No hydronephrosis. No renal calculi. Subcentimeter   hypodense foci are noted within the bilateral renal parenchyma, too small   to characterize.    BLADDER: Within normal limits.  REPRODUCTIVE ORGANS: Uterus and adnexa within normal limits.    BOWEL: Abundant fecal material scattered throughout the colon and rectum.   An element of fecal rectal impaction cannot be fully excluded. Appendix   is not visualized.  PERITONEUM: No ascites.  VESSELS: Thrombus is identified within the right femoral vein as well as   deep femoral vein consistent with DVT.  RETROPERITONEUM/LYMPH NODES: No lymphadenopathy.  ABDOMINAL WALL: There is a marginally enhancing air/fluid collection,   which may be multiloculated measuring 4.5x 2.0 cm within the left   gluteal musculature. Subcutaneous air is identified overlying the   inferior sacrum and medial right gluteal region. Diffuse anasarca.  BONES: Droplets of epidural air are identified at the level of the sacral   canal; the patient is status post laminectomy at L4-L5. Differential   considerations of the epidural air would include extension of the sacral   decubitus infection versus secondary to degenerative change.    IMPRESSION:  1. Bilateral pulmonary emboli. Thrombusis identified within the right   femoral vein as well as deep femoral vein consistent with DVT.  2. There is a marginally enhancing air/fluid collection, which may be   multiloculated measuring 4.5 x 2.0 cm within the left gluteal   musculature. Subcutaneous air is identified overlying the inferior sacrum   and medial right gluteal region.  3. Droplets of epidural air are identified at the level of the sacral   canal; the patient is status post laminectomy at L4-L5. Differential   considerations of the epidural air would include extension of the sacral   decubitus infection versus secondary to degenerative change.      Findings were discussed with Dr. GETACHEW MAYORGA [id number] 2022   2:44 PM by Dr. Odell with read back confirmation.    --- End of Report ---    ELENI ODELL MD; Attending Radiologist  This document has been electronically signed. Dec 24 2022  2:45PM    < end of copied text >    < from: Xray Chest 1 View- PORTABLE-Urgent (22 @ 11:42) >    ACC: [id number] EXAM:  XR CHEST PORTABLE URGENT 1V                          PROCEDURE DATE:  2022          INTERPRETATION:  Portable chest radiograph    CLINICAL INFORMATION: Sepsis    TECHNIQUE:  Portable  AP chest radiograph.    COMPARISON: None. .    FINDINGS:  CATHETERS AND TUBES: None    PULMONARY: Elevated LEFT hemidiaphragm The visualized lungs are clear of   airspace consolidations or pleural effusions.   No pneumothorax.    HEART/VASCULAR: The heart and mediastinum size and configuration are   within normal limits.    BONES: Visualized osseous structures are intact.    IMPRESSION:   No radiographic evidence of active chest disease.  Please see same day chest CT scan report.    --- End of Report ---      SOLOMON SALOMON; Attending Radiologist    < end of copied text >    < from: TTE Echo Complete w/o Contrast w/ Doppler (22 @ 07:51) >    ACC: [id number] EXAM:  ECHO TTE WO CON COMP W DOPP                          PROCEDURE DATE:  2022          INTERPRETATION:  TRANSTHORACIC ECHOCARDIOGRAM REPORT        Patient Name:   LIAM GARCIA Patient Location: [address] Rec #:  [id number]   Accession #:      [id number]  Account #:      [id number]   Height:           62.0 in 157.5 cm  YOB: 1935  Weight:           88.2 lb 40.00 kg  Patient Age:    87 years   BSA:              1.35 m²  Patient Gender: F          BP:     127/66 mmHg      Date of Exam:        2022 7:51:29 AM  Sonographer:         KULWANT  Referring Physician: ANURADHA    Procedure:     2D Echo/Doppler/Color Doppler Complete.  Indications:   Dyspnea, unspecified - R06.00  Diagnosis:     Dyspnea, unspecified - R06.00  Study Details: Technically fair study.        2D AND M-MODE MEASUREMENTS (normal ranges within parentheses):  Left Ventricle:                  Normal   Aorta/Left Atrium:               Normal  IVSd (2D):              1.30 cm (0.7-1.1) Aortic Root (Mmode): 2.92 cm   (2.4-3.7)  LVPWd (2D):             1.00 cm (0.7-1.1) AoV Cusp Separation: 1.91 cm   (1.5-2.6)  LVIDd (2D):             3.21 cm (3.4-5.7) Left Atrium (Mmode): 3.09 cm   (1.9-4.0)  LVIDs (2D):             2.40 cm  LVFS (2D):             25.3 %   (>25%)  LV EF (2D):              51 %    (>55%)  Relative Wall Thickness  0.62    (<0.42)    LV DIASTOLIC FUNCTION:  MV Peak E: 0.66 m/s Decel Time: 178 msec  MV Peak A: 0.91 m/s  E/A Ratio: 0.72    SPECTRAL DOPPLER ANALYSIS (where applicable):  Mitral Valve:  MV P1/2 Time: 51.67 msec  MV Area, PHT: 4.26 cm²    Aortic Valve: AoV Max Bruce: 1.20 m/s AoV Peak P.7 mmHg AoV Mean PG:   3.1 mmHg    LVOT Vmax: 0.80 m/s LVOT VTI: 0.180 m LVOT Diameter: 1.80 cm    AoV Area,Vmax: 1.70 cm² AoV Area, VTI: 1.61 cm² AoV Area, Vmn: 1.48 cm²  Ao VTI: 0.285  Tricuspid Valve and PA/RV Systolic Pressure: TR Max Velocity: 1.56 m/s RA   Pressure: 10 mmHg RVSP/PASP: 19.8 mmHg      PHYSICIAN INTERPRETATION:  Left Ventricle: The leftventricular internal cavity size is normal.  Global LV systolic function was mildly decreased. Left ventricular   ejection fraction, by visual estimation, is 45 to 50%. Spectral Doppler   shows impaired relaxation pattern of left ventricular myocardial filling   (Grade I diastolic dysfunction). Echogenic region at apex suggestive of   small thrombus.      LV Wall Scoring:  The mid anteroseptal segment, apical anterior segment, and apex are   akinetic.  The mid and apical inferior septum is hypokinetic. All remaining scored  segments are normal.    Right Ventricle: Normal right ventricular size and function. The right   ventricular size is normal.  Left Atrium: Normal left atrial size.  Right Atrium: Normal right atrial size.  Pericardium: There is no evidence of pericardial effusion.  Mitral Valve: Structurally normal mitral valve, with normal leaflet   excursion. Thickening of the anterior and posterior mitral valve   leaflets. Mild mitral valve regurgitation is seen.  Tricuspid Valve: The tricuspid valve is normal in structure. Mild   tricuspid regurgitation is visualized.  Aortic Valve: The aortic valve is trileaflet. Sclerotic aortic valve with   normal opening. Trivial aortic valve regurgitation is seen.  Aorta: The aortic root is normal in size and structure.  Venous: The inferior vena cava is normal. The inferior vena cava was   normal sized, with respiratory size variation greater than 50%.      Summary:   1. Left ventricular ejection fraction, by visual estimation, is 45 to   50%.   2. Mildly decreased global left ventricular systolic function.   3. Mid anteroseptal segment, apical anterior segment, apex, and mid and   apical inferior septum are abnormal as described above.   4. Spectral Doppler shows impaired relaxation pattern of left   ventricular myocardial filling (Grade I diastolic dysfunction).   5. There is mild septal left ventricular hypertrophy.   6. Echogenic region at apex suggestive of small thrombus.   7. Normal left atrial size.   8. Normal right atrial size.   9. Mild mitral valve regurgitation.  10. Thickening of the anterior and posterior mitral valve leaflets.  11. Mild tricuspid regurgitation.  12. Sclerotic aortic valve with normal opening.    [id number] Alex Samson MD,[facility] , Electronically signed on   2022 at 10:09:46 AM        < end of copied text >        
  HPI  This is an 87-year-old female  with h/o Dementia, colon cancer s/p resection brought in by EMS to the ED by her daughter for a decubitus ulcer and increasing weakness.  Patient was also short of breath per EMS.  Per patient's daughter she had a small ulcer that began 6 months ago, which they were doing local wound care for, however she reports she has not seen the ulcer in 2 weeks, when she looked at it she saw that it was large and very deep.  As per daughter, patient has been declining in health for the past year, last seen by pmd 18 months ago. Patient was ambulating with poor balance, became incontinent over 6 months ago. She developed and a sacral ulcer while wearing a diaper and being less mobile. She eventually became more sedentary, bedbound about  2 weeks ago. Sacral ulcer soon developed after that and was noticed by daughter as the size of a coin. Daughter recently noticed  the ulcer became very large. There was no reported fever, chills no chest pain no nausea vomiting.    In the ED, patient was evaluated for respiratory distress and lower back tenderness. She was initiated on sepsis protocol, IVF resuscitation, started on IV antibiotics, blood cultures and lactic acid levels were obtained. A CT angiogram of chest and CT of abdomen/ pelvis were performed.  It showed Gluteal Abcess, b/l PE, Fem DVT and sacral decub    Critical care and general surgery were both consulted  Surgical consult called         PAST MEDICAL & SURGICAL HISTORY:  Colon cancer      Dementia      S/P colon resection          SOCIAL HISTORY:    Admitted from:  home   Substance abuse history:              Tobacco hx:                  Alcohol hx:              Home Opioid hx:  Mosque:                                    Preferred Language:    Surrogate/HCP/: ( Daughter and her  )  daughter Tomas Carr h# [phone]  cell#  [phone]  w#   [id number] [id number]            FAMILY HISTORY:    Baseline ADLs (prior to admission):    Allergies    No Known Allergies    Intolerances        Review of Systems:  Unable to obtain due to poor mentation]    MEDICATIONS  (STANDING):  aspirin  chewable 81 milliGRAM(s) Oral daily  heparin  Infusion.  Unit(s)/Hr (8 mL/Hr) IV Continuous <Continuous>  midodrine 5 milliGRAM(s) Oral every 8 hours  pantoprazole  Injectable 40 milliGRAM(s) IV Push daily  piperacillin/tazobactam IVPB.. 3.375 Gram(s) IV Intermittent every 8 hours  polyethylene glycol 3350 17 Gram(s) Oral two times a day  senna 2 Tablet(s) Oral at bedtime    MEDICATIONS  (PRN):  acetaminophen     Tablet .. 650 milliGRAM(s) Oral every 6 hours PRN Temp greater or equal to 38C (100.4F), Mild Pain (1 - 3)  heparin   Injectable 3500 Unit(s) IV Push every 6 hours PRN For aPTT less than 40  heparin   Injectable 1500 Unit(s) IV Push every 6 hours PRN For aPTT between 40 - 57      PHYSICAL EXAM:    Vital Signs Last 24 Hrs  T(C): 36.3 (27 Dec 2022 05:00), Max: 36.7 (26 Dec 2022 14:01)  T(F): 97.4 (27 Dec 2022 05:00), Max: 98.1 (26 Dec 2022 14:01)  HR: 82 (27 Dec 2022 05:00) (60 - 82)  BP: 107/65 (27 Dec 2022 05:00) (92/55 - 107/65)  BP(mean): 69 (26 Dec 2022 12:00) (67 - 69)  RR: 18 (27 Dec 2022 05:00) (13 - 20)  SpO2: 100% (27 Dec 2022 05:00) (100% - 100%)    Parameters below as of 27 Dec 2022 05:00  Patient On (Oxygen Delivery Method): nasal cannula  O2 Flow (L/min): 2      General: alert  w/ stim, otherwise lethargic  verbal , mostly chinese   Karnofsky Performance Score/Palliative Performance Status Version2:  30   %  PPSV: 30%  HEENT: normal  dry mouth    Lungs: clear, dim to bases, breathing comfortable   CV: normal rate  GI:  abd soft, n/t    : normal  incontinent  , sharma  Musculoskeletal: normal  w/ weakness    Skin: w/d, pale,  pressure ulcers: sacrum   Neuro:  deficits , alert, w/stim,  does not follow , mumbles few words   Oral intake ability: minimal moderate   Diet: as henry     LABS:                        7.9    13.02 )-----------( 204      ( 27 Dec 2022 07:25 )             24.0     12-27    135  |  103  |  25<H>  ----------------------------<  133<H>  4.2   |  25  |  1.03    Ca    7.7<L>      27 Dec 2022 07:25  Phos  4.0     12-27  Mg     1.8     12-27          RADIOLOGY & ADDITIONAL STUDIES: < from: CT Chest w/ IV Cont (12.24.22 @ 13:58) >  ACC: [id number] EXAM:  CT ABDOMEN AND PELVIS IC                        ACC: [id number] EXAM:  CT CHEST IC                          PROCEDURE DATE:  12/24/2022          INTERPRETATION:  CLINICAL INFORMATION: Shortness of breath, evaluate for   DVT. Fever, assess for sacral decubitus ulceration.    COMPARISON: None.    CONTRAST/COMPLICATIONS:  IV Contrast: Omnipaque 350 (accession [id number]), IV contrast documented   in unlinked concurrent exam (accession [id number])  90 cc administered   10   cc discarded  Oral Contrast: NONE  Complications: None reported at time of study completion    PROCEDURE:  CT Angiography of the Chest was performed followed by portal venous phase   imaging of the Abdomen and Pelvis.  Sagittal and coronal reformats were performed as well as 3D (MIP)   reconstructions.    FINDINGS:  CHEST:  LUNGS AND LARGE AIRWAYS: Patent central airways. Opacity within the   inferior/posterior aspect of the right lower lobe may represent   atelectasis versus infiltrate.  PLEURA: Small bilateral pleural effusions.  VESSELS: Bilateral pulmonary emboli identified.  HEART: Heart size is normal. No pericardial effusion.  MEDIASTINUM AND SOLITARIO: No lymphadenopathy.  CHEST WALL AND LOWER NECK: Hypodense nodules measuring up to 7 mm   identified within the visualized portion of the thyroid parenchyma.    ABDOMEN AND PELVIS:  LIVER: Within normal limits.  BILE DUCTS: Normal caliber.  GALLBLADDER: Not visualized.  SPLEEN: Within normal limits.  PANCREAS: Within normal limits.  ADRENALS: Within normal limits.  KIDNEYS/URETERS: No hydronephrosis. No renal calculi. Subcentimeter   hypodense foci are noted within the bilateral renal parenchyma, too small   to characterize.    BLADDER: Within normal limits.  REPRODUCTIVE ORGANS: Uterus and adnexa within normal limits.    BOWEL: Abundant fecal material scattered throughout the colon and rectum.   An element of fecal rectal impaction cannot be fully excluded. Appendix   is not visualized.  PERITONEUM: No ascites.  VESSELS: Thrombus is identified within the right femoral vein as well as   deep femoral vein consistent with DVT.  RETROPERITONEUM/LYMPH NODES: No lymphadenopathy.  ABDOMINAL WALL: There is a marginally enhancing air/fluid collection,   which may be multiloculated measuring 4.5x 2.0 cm within the left   gluteal musculature. Subcutaneous air is identified overlying the   inferior sacrum and medial right gluteal region. Diffuse anasarca.  BONES: Droplets of epidural air are identified at the level of the sacral   canal; the patient is status post laminectomy at L4-L5. Differential   considerations of the epidural air would include extension of the sacral   decubitus infection versus secondary to degenerative change.    IMPRESSION:  1. Bilateral pulmonary emboli. Thrombusis identified within the right   femoral vein as well as deep femoral vein consistent with DVT.  2. There is a marginally enhancing air/fluid collection, which may be   multiloculated measuring 4.5 x 2.0 cm within the left gluteal   musculature. Subcutaneous air is identified overlying the inferior sacrum   and medial right gluteal region.  3. Droplets of epidural air are identified at the level of the sacral   canal; the patient is status post laminectomy at L4-L5. Differential   considerations of the epidural air would include extension of the sacral   decubitus infection versus secondary to degenerative change.      < from: TTE Echo Complete w/o Contrast w/ Doppler (12.25.22 @ 07:51) >  ACC: [id number] EXAM:  ECHO TTE WO CON COMP W DOPP                          PROCEDURE DATE:  12/25/2022        < from: TTE Echo Complete w/o Contrast w/ Doppler (12.25.22 @ 07:51) >  Summary:   1. Left ventricular ejection fraction, by visual estimation, is 45 to   50%.   2. Mildly decreased global left ventricular systolic function.   3. Mid anteroseptal segment, apical anterior segment, apex, and mid and   apical inferior septum are abnormal as described above.   4. Spectral Doppler shows impaired relaxation pattern of left   ventricular myocardial filling (Grade I diastolic dysfunction).   5. There is mild septal left ventricular hypertrophy.   6. Echogenic region at apex suggestive of small thrombus.   7. Normal left atrial size.   8. Normal right atrial size.   9. Mild mitral valve regurgitation.  10. Thickening of the anterior and posterior mitral valve leaflets.  11. Mild tricuspid regurgitation.  12. Sclerotic aortic valve with normal opening.          ADVANCE DIRECTIVES: HCP/ living will Molst dnr/dni    Advanced Care Planning discussion total time spent:

## 2022-12-27 NOTE — CONSULT NOTE ADULT - CONVERSATION DETAILS
Spoke with pts daughter Bruno who is also a HCP. Jeans  is the other HCP. Daughter at work and is unable to be here right now. Pt has been living with the daughter and her . Pt has been at home with help of HHA and daughter who reports pt has been declining over past year, daughter says she sees a significant change in a quick amount of time. Pt has history of dementia and daughter sees pt declining , eating less, and less ambulatory . Daughter confirmed pts Molst with me, dnr/dni. We also discussed feeding tube today , and daughter feels that her mother would not want a feeding tube. Daughter said she realizes her mother is declining and says she just wants her mother to be comfortable at this point. In addition, pt now has a large sacral decub requiring debridement .  I talked with her about home hospice services, and reviewed what that would entail and include. Daughter is interested in these services, and would like to speak with someone from hospice .

## 2022-12-27 NOTE — PROGRESS NOTE ADULT - ASSESSMENT
A: s/p sacral wound excisional debridement POD#1      acute blood loss anemia     septic shock on Zosyn     DVT/PE on heparin drip     colon ca     dementia    P: NPO after midnight for return to OR 12/28/22 for further debridement and wound vac application     Medical management; ID f//u     Monitor H/H, coags

## 2022-12-27 NOTE — PROGRESS NOTE ADULT - SUBJECTIVE AND OBJECTIVE BOX
CC: f/u for infected  decub and polymicrobial bacteremia     Patient reports nothing    REVIEW OF SYSTEMS: limited  All other review of systems negative (Comprehensive ROS)    Antimicrobials Day #  4  piperacillin/tazobactam IVPB.. 3.375 Gram(s) IV Intermittent every 8 hours      Other Medications Reviewed    ICU Vital Signs Last 24 Hrs  T(C): 36.3 (27 Dec 2022 05:00), Max: 36.7 (26 Dec 2022 14:01)  T(F): 97.4 (27 Dec 2022 05:00), Max: 98.1 (26 Dec 2022 14:01)  HR: 82 (27 Dec 2022 05:00) (60 - 82)  BP: 107/65 (27 Dec 2022 05:00) (92/55 - 120/72)  BP(mean): 69 (26 Dec 2022 12:00) (67 - 69)  ABP: --  ABP(mean): --  RR: 18 (27 Dec 2022 05:00) (12 - 21)  SpO2: 100% (27 Dec 2022 05:00) (100% - 100%)    O2 Parameters below as of 27 Dec 2022 05:00  Patient On (Oxygen Delivery Method): nasal cannula  O2 Flow (L/min): 2          PHYSICAL EXAM:  General: alert,in fetal position no acute distress  Eyes:  anicteric, no conjunctival injection, no discharge  Oropharynx: no lesions or injection 	  Neck: supple, without adenopathy  Lungs: clear to auscultation  Heart: regular rate and rhythm; no murmur, rubs or gallops  Abdomen: soft, nondistended, nontender, without mass or organomegaly  Skin: no lesions  Extremities: no clubbing, cyanosis, / Legs with  edema  Neurologic: alert, not moving much  back with necrotic decube, grossly purulent discharge, malodorous, some edematous changes over the left gluteal area.     LAB RESULTS:                          8.5    14.58 )-----------( 218      ( 27 Dec 2022 06:15 )             24.9       Urinalysis Basic - ( 24 Dec 2022 14:42 )    Color: Yellow / Appearance: Clear / S.015 / pH: x  Gluc: x / Ketone: Negative  / Bili: Small / Urobili: 4   Blood: x / Protein: 30 mg/dL / Nitrite: Negative   Leuk Esterase: Trace / RBC: 26-50 /HPF / WBC 3-5 /HPF   Sq Epi: x / Non Sq Epi: Neg.-Few / Bacteria: Negative /HPF        MICROBIOLOGY REVIEWED:  Culture - Blood (22 @ 11:34)   - Bacteroides fragilis: Detec   Gram Stain:   Growth in anaerobic bottle: Gram positive cocci in pairs and Gram   Negative Rods   Specimen Source: .Blood Blood-Peripheral   Culture - Tissue with Gram Stain (22 @ 11:47)   Gram Stain:   No polymorphonuclear leukocytes seen per low power field   Numerous Gram positive cocci in pairs seen per oil power field   Numerous Gram Variable Rods seen per oil power field   Specimen Source: Bone SACRAL #4   Culture - Blood (22 @ 11:34)   - Bacteroides fragilis: Detec   Gram Stain:   Growth in anaerobic bottle: Gram positive cocci in pairs and Gram   Negative Rods   Specimen Source: .Blood Blood-Peripheral   Organism: Blood Culture PCR   Culture Results:   Growth in anaerobic bottle: Gram positive cocci in pairs and Gram   Negative Rods   ***Blood Panel PCR results on this specimen are available   approximately 3 hours after the Gram stain result.***   Gram stain, PCR, and/or culture results may not always   correspond due to difference in methodologies.   RADIOLOGY REVIEWED:  < from: CT Chest w/ IV Cont (22 @ 13:58) >    IMPRESSION:  1. Bilateral pulmonary emboli. Thrombusis identified within the right   femoral vein as well as deep femoral vein consistent with DVT.  2. There is a marginally enhancing air/fluid collection, which may be   multiloculated measuring 4.5 x 2.0 cm within the left gluteal   musculature. Subcutaneous air is identified overlying the inferior sacrum   and medial right gluteal region.  3. Droplets of epidural air are identified at the level of the sacral   canal; the patient is status post laminectomy at L4-L5. Differential   considerations of the epidural air would include extension of the sacral   decubitus infection versus secondary to degenerative change.    < end of copied text >     CC: f/u for infected  decub and polymicrobial bacteremia     Patient reports nothing    REVIEW OF SYSTEMS: limited  All other review of systems negative (Comprehensive ROS)    Antimicrobials Day #  4  piperacillin/tazobactam IVPB.. 3.375 Gram(s) IV Intermittent every 8 hours      Other Medications Reviewed    ICU Vital Signs Last 24 Hrs  T(C): 36.3 (27 Dec 2022 05:00), Max: 36.7 (26 Dec 2022 14:01)  T(F): 97.4 (27 Dec 2022 05:00), Max: 98.1 (26 Dec 2022 14:01)  HR: 82 (27 Dec 2022 05:00) (60 - 82)  BP: 107/65 (27 Dec 2022 05:00) (92/55 - 120/72)  BP(mean): 69 (26 Dec 2022 12:00) (67 - 69)  ABP: --  ABP(mean): --  RR: 18 (27 Dec 2022 05:00) (12 - 21)  SpO2: 100% (27 Dec 2022 05:00) (100% - 100%)    O2 Parameters below as of 27 Dec 2022 05:00  Patient On (Oxygen Delivery Method): nasal cannula  O2 Flow (L/min): 2          PHYSICAL EXAM:  General: alert,in fetal position no acute distress  Eyes:  anicteric, no conjunctival injection, no discharge  Oropharynx: no lesions or injection 	  Neck: supple, without adenopathy  Lungs: clear to auscultation  Heart: regular rate and rhythm; no murmur, rubs or gallops  Abdomen: soft, nondistended, nontender, without mass or organomegaly  Skin: no lesions  Extremities: no clubbing, cyanosis, / Legs with  edema  Neurologic: alert, not moving much  back with necrotic decub--dressed     LAB RESULTS:                          8.5    14.58 )-----------( 218      ( 27 Dec 2022 06:15 )             24.9       Urinalysis Basic - ( 24 Dec 2022 14:42 )    Color: Yellow / Appearance: Clear / S.015 / pH: x  Gluc: x / Ketone: Negative  / Bili: Small / Urobili: 4   Blood: x / Protein: 30 mg/dL / Nitrite: Negative   Leuk Esterase: Trace / RBC: 26-50 /HPF / WBC 3-5 /HPF   Sq Epi: x / Non Sq Epi: Neg.-Few / Bacteria: Negative /HPF        MICROBIOLOGY REVIEWED:  Culture - Blood (22 @ 11:34)   - Bacteroides fragilis: Detec   Gram Stain:   Growth in anaerobic bottle: Gram positive cocci in pairs and Gram   Negative Rods   Specimen Source: .Blood Blood-Peripheral   Culture - Tissue with Gram Stain (22 @ 11:47)   Gram Stain:   No polymorphonuclear leukocytes seen per low power field   Numerous Gram positive cocci in pairs seen per oil power field   Numerous Gram Variable Rods seen per oil power field   Specimen Source: Bone SACRAL #4   Culture - Blood (22 @ 11:34)   - Bacteroides fragilis: Detec   Gram Stain:   Growth in anaerobic bottle: Gram positive cocci in pairs and Gram   Negative Rods   Specimen Source: .Blood Blood-Peripheral   Organism: Blood Culture PCR   Culture Results:   Growth in anaerobic bottle: Gram positive cocci in pairs and Gram   Negative Rods   ***Blood Panel PCR results on this specimen are available   approximately 3 hours after the Gram stain result.***   Gram stain, PCR, and/or culture results may not always   correspond due to difference in methodologies.   RADIOLOGY REVIEWED:  < from: CT Chest w/ IV Cont (22 @ 13:58) >    IMPRESSION:  1. Bilateral pulmonary emboli. Thrombusis identified within the right   femoral vein as well as deep femoral vein consistent with DVT.  2. There is a marginally enhancing air/fluid collection, which may be   multiloculated measuring 4.5 x 2.0 cm within the left gluteal   musculature. Subcutaneous air is identified overlying the inferior sacrum   and medial right gluteal region.  3. Droplets of epidural air are identified at the level of the sacral   canal; the patient is status post laminectomy at L4-L5. Differential   considerations of the epidural air would include extension of the sacral   decubitus infection versus secondary to degenerative change.    < end of copied text >

## 2022-12-27 NOTE — DISCHARGE NOTE NURSING/CASE MANAGEMENT/SOCIAL WORK - NSDCPEFALRISK_GEN_ALL_CORE
For information on Fall & Injury Prevention, visit: [email or web address] OR  [email or web address] OR  https://www.cdc.gov/steadi/patient.html

## 2022-12-27 NOTE — PROGRESS NOTE ADULT - ASSESSMENT
86 y/o F w/ pmh of dementia, colon ca s/p resection, presented to [facility] on 12/24/2022 for worsening DU and increased weakness, pt was admitted septic shock 2/2 to large sacral DU w/ lactic acidosis, R. femoral vein DVT, b/l PE.      Active Problem List  1. Sacral DU now s/p debridement   2. RLE DVT  3. B/l DVT  4. ABLA 2/2 to post-op bleeding      -Called to bedside this morning after pt was found in a pool of blood from the DU/debridement sites, heparin gtt stopped immediately, stop aspirin (AM does not given per RN) repeat CBC/coags and type and screen ordered, pt SBP dropped to 90 and then 85 thus we will order 1U of PRBC stat, check BP q5 mins, repeat CBC 1 hour after blood transfusion is completed, daughter called and notify, telephone consent obtained for blood transfusion and paperwork placed in chart, attempted to call surgical attending dr. aguilar but unable to reach, will sign out case to day Critical care PA

## 2022-12-27 NOTE — PROGRESS NOTE ADULT - SUBJECTIVE AND OBJECTIVE BOX
Patient is a 87y old  Female who presents with a chief complaint of decubitus ulcer (27 Dec 2022 10:12)      Events last 24 hours:   Patient remains afebrile, tolerating n/c,  no events overnight. She is scheduled for sacral debridement this morning. NPO overnight, heparin infusion turned off. She has no complaints.       PAST MEDICAL & SURGICAL HISTORY:  Colon cancer  Dementia  S/P colon resection        Review of Systems:  CONSTITUTIONAL: No fever, chills, or fatigue  EYES: No eye pain, visual disturbances, or discharge  ENMT:  No difficulty hearing, tinnitus, vertigo; No sinus or throat pain  NECK: No pain or stiffness  RESPIRATORY: No cough, wheezing, chills or hemoptysis; No shortness of breath  CARDIOVASCULAR: No chest pain, palpitations, dizziness, or leg swelling  GASTROINTESTINAL: No abdominal or epigastric pain. No nausea, vomiting, or hematemesis; No diarrhea or constipation. No melena or hematochezia.  GENITOURINARY: No dysuria, frequency, hematuria, or incontinence  NEUROLOGICAL: No headaches, memory loss, loss of strength, numbness, or tremors  SKIN: No itching, burning, rashes, or lesions   MUSCULOSKELETAL: No joint pain or swelling; No muscle, back, or extremity pain  PSYCHIATRIC: No depression, anxiety, mood swings, or difficulty sleeping      Medications:  piperacillin/tazobactam IVPB.. 3.375 Gram(s) IV Intermittent every 8 hours    midodrine 5 milliGRAM(s) Oral every 8 hours      acetaminophen     Tablet .. 650 milliGRAM(s) Oral every 6 hours PRN      aspirin  chewable 81 milliGRAM(s) Oral daily  heparin   Injectable 3500 Unit(s) IV Push every 6 hours PRN  heparin   Injectable 1500 Unit(s) IV Push every 6 hours PRN  heparin  Infusion.  Unit(s)/Hr IV Continuous <Continuous>    pantoprazole  Injectable 40 milliGRAM(s) IV Push daily  polyethylene glycol 3350 17 Gram(s) Oral two times a day  senna 2 Tablet(s) Oral at bedtime                      ICU Vital Signs Last 24 Hrs  T(C): 36.3 (27 Dec 2022 05:00), Max: 36.7 (26 Dec 2022 14:01)  T(F): 97.4 (27 Dec 2022 05:00), Max: 98.1 (26 Dec 2022 14:01)  HR: 82 (27 Dec 2022 05:00) (60 - 82)  BP: 107/65 (27 Dec 2022 05:00) (92/55 - 120/72)  BP(mean): 69 (26 Dec 2022 12:00) (67 - 69)  ABP: --  ABP(mean): --  RR: 18 (27 Dec 2022 05:00) (12 - 20)  SpO2: 100% (27 Dec 2022 05:00) (100% - 100%)    O2 Parameters below as of 27 Dec 2022 05:00  Patient On (Oxygen Delivery Method): nasal cannula  O2 Flow (L/min): 2              I&O's Detail    26 Dec 2022 07:01  -  27 Dec 2022 07:00  --------------------------------------------------------  IN:  Total IN: 0 mL    OUT:    Voided (mL): 500 mL  Total OUT: 500 mL    Total NET: -500 mL      27 Dec 2022 07:01  -  27 Dec 2022 10:52  --------------------------------------------------------  IN:    PRBCs (Packed Red Blood Cells): 327 mL  Total IN: 327 mL    OUT:  Total OUT: 0 mL    Total NET: 327 mL            LABS:                        7.9    13.02 )-----------( 204      ( 27 Dec 2022 07:25 )             24.0     12-27    135  |  103  |  25<H>  ----------------------------<  133<H>  4.2   |  25  |  1.03    Ca    7.7<L>      27 Dec 2022 07:25  Phos  4.0     12-27  Mg     1.8     12-27            CAPILLARY BLOOD GLUCOSE        PT/INR - ( 27 Dec 2022 07:25 )   PT: 16.0 sec;   INR: 1.37 ratio         PTT - ( 27 Dec 2022 07:25 )  PTT:69.5 sec    CULTURES:  Culture Results:   No growth (12-24-22 @ 14:42)  Rapid RVP Result: NotDetec (12-24-22 @ 11:47)  Culture Results:   Growth in aerobic and anaerobic bottles: Streptococcus anginosus Alpha  hemolytic strep  (not Strep. pneumoniae or Enterococcus)  Single set isolate, possible contaminant. Contact  Microbiology if susceptibility testing clinically  indicated.  Growth in anaerobic bottle: Bacteroides fragilis "Susceptibilities not  performed"  ***Blood Panel PCR results on this specimen are available  approximately 3 hours after the Gram stain result.***  Gram stain, PCR, and/or culture results may not always  correspond due to difference in methodologies.  ************************************************************  This PCR assay was performed by multiplex PCR. This  Assay tests for 66 bacterial and resistance gene targets.  Please refer to the [facility] test directory  at [email or web address] for details. (12-24-22 @ 11:34)  Culture Results:   Growth in anaerobic bottle: Bacteroides fragilis  Growth in anaerobic bottle: Gram positive cocci in pairs  ***Blood Panel PCR results on this specimen are available  approximately 3 hours after the Gram stain result.***  Gram stain, PCR, and/or culture results may not always  correspond due to difference in methodologies.  ************************************************************  This PCR assay was performed by multiplex PCR. This  Assay tests for 66 bacterial and resistance gene targets.  Please refer to the [email or web address] test directory  at [email or web address] for details. (12-24-22 @ 11:34)      Physical Examination:    General: No acute distress.  Alert, oriented, interactive, nonfocal    HEENT: Pupils equal, reactive to light.  Symmetric.    PULM: Clear to auscultation bilaterally, no significant sputum production    CVS: Regular rate and rhythm, no murmurs, rubs, or gallops    ABD: Soft, nondistended, nontender, normoactive bowel sounds, no masses    EXT: No edema, nontender    SKIN: Warm and well perfused, no rashes noted.    RADIOLOGY: ***    CRITICAL CARE TIME SPENT:  minutes of critical care time spent providing medical care for patient's acute illness/conditions that impairs at least one vital organ system and/or poses a high risk of imminent or life threatening deterioration in the patient's condition. It includes time spent evaluating and treating the patient's acute illness as well as time spent reviewing labs, radiology, discussing goals of care with patient and/or patient's family, and discussing the case with a multidisciplinary team, including the eICU, in an effort to prevent further life threatening deterioration or end organ damage. This time is independent of any procedures performed.       Patient is a 87y old  Female who presents with a chief complaint of decubitus ulcer (27 Dec 2022 10:12)      Events last 24 hours:   Events from overnight were noted: Patient underwent a surgical debridement of the sacral decubiti yesterday. Post-operatively, heparin infusion was restarted. Blood work was drawn and coags were followed. Patient was being cleaned early this morning, nursing staff found pt with a significant amount of blood in the bed. The surgical site padding was fully soaked. A call was then placed to the surgical attending dr. Iglesias. Heparin infusion was held and labs were repeated. Patient remained hemodynamically stable throughout, no change in heart rate or BP. She remained afebrile, without any changes in mental status and no new complaints.      PAST MEDICAL & SURGICAL HISTORY:  Colon cancer  Dementia  S/P colon resection        Medications:  piperacillin/tazobactam IVPB.. 3.375 Gram(s) IV Intermittent every 8 hours    midodrine 5 milliGRAM(s) Oral every 8 hours      acetaminophen     Tablet .. 650 milliGRAM(s) Oral every 6 hours PRN      aspirin  chewable 81 milliGRAM(s) Oral daily  heparin   Injectable 3500 Unit(s) IV Push every 6 hours PRN  heparin   Injectable 1500 Unit(s) IV Push every 6 hours PRN  heparin  Infusion.  Unit(s)/Hr IV Continuous <Continuous>    pantoprazole  Injectable 40 milliGRAM(s) IV Push daily  polyethylene glycol 3350 17 Gram(s) Oral two times a day  senna 2 Tablet(s) Oral at bedtime            ICU Vital Signs Last 24 Hrs  T(C): 36.3 (27 Dec 2022 05:00), Max: 36.7 (26 Dec 2022 14:01)  T(F): 97.4 (27 Dec 2022 05:00), Max: 98.1 (26 Dec 2022 14:01)  HR: 82 (27 Dec 2022 05:00) (60 - 82)  BP: 107/65 (27 Dec 2022 05:00) (92/55 - 120/72)  BP(mean): 69 (26 Dec 2022 12:00) (67 - 69)  ABP: --  ABP(mean): --  RR: 18 (27 Dec 2022 05:00) (12 - 20)  SpO2: 100% (27 Dec 2022 05:00) (100% - 100%)    O2 Parameters below as of 27 Dec 2022 05:00  Patient On (Oxygen Delivery Method): nasal cannula  O2 Flow (L/min): 2              I&O's Detail    26 Dec 2022 07:01  -  27 Dec 2022 07:00  --------------------------------------------------------  IN:  Total IN: 0 mL    OUT:    Voided (mL): 500 mL  Total OUT: 500 mL    Total NET: -500 mL      27 Dec 2022 07:01  -  27 Dec 2022 10:52  --------------------------------------------------------  IN:    PRBCs (Packed Red Blood Cells): 327 mL  Total IN: 327 mL    OUT:  Total OUT: 0 mL    Total NET: 327 mL            LABS:                        7.9    13.02 )-----------( 204      ( 27 Dec 2022 07:25 )             24.0     12-27    135  |  103  |  25<H>  ----------------------------<  133<H>  4.2   |  25  |  1.03    Ca    7.7<L>      27 Dec 2022 07:25  Phos  4.0     12-27  Mg     1.8     12-27            CAPILLARY BLOOD GLUCOSE        PT/INR - ( 27 Dec 2022 07:25 )   PT: 16.0 sec;   INR: 1.37 ratio         PTT - ( 27 Dec 2022 07:25 )  PTT:69.5 sec    CULTURES:  Culture Results:   No growth (12-24-22 @ 14:42)  Rapid RVP Result: NotDetec (12-24-22 @ 11:47)  Culture Results:   Growth in aerobic and anaerobic bottles: Streptococcus anginosus Alpha  hemolytic strep  (not Strep. pneumoniae or Enterococcus)  Single set isolate, possible contaminant. Contact  Microbiology if susceptibility testing clinically  indicated.  Growth in anaerobic bottle: Bacteroides fragilis "Susceptibilities not  performed"  ***Blood Panel PCR results on this specimen are available  approximately 3 hours after the Gram stain result.***  Gram stain, PCR, and/or culture results may not always  correspond due to difference in methodologies.  ************************************************************  This PCR assay was performed by multiplex PCR. This  Assay tests for 66 bacterial and resistance gene targets.  Please refer to the [facility] test directory  at [email or web address] for details. (12-24-22 @ 11:34)  Culture Results:   Growth in anaerobic bottle: Bacteroides fragilis  Growth in anaerobic bottle: Gram positive cocci in pairs  ***Blood Panel PCR results on this specimen are available  approximately 3 hours after the Gram stain result.***  Gram stain, PCR, and/or culture results may not always  correspond due to difference in methodologies.  ************************************************************  This PCR assay was performed by multiplex PCR. This  Assay tests for 66 bacterial and resistance gene targets.  Please refer to the [facility] test directory  at [email or web address] for details. (12-24-22 @ 11:34)      Physical Examination:    General: Awake with dyspnea on exertion.  Alert, oriented, interactive, minimally verbal.    HEENT: Pupils equal, reactive to light.  Symmetric.    PULM: Diminished to auscultation bilaterally, no significant sputum production    CVS: Regular rate and rhythm, no murmurs, rubs, or gallops    ABD: Soft, nondistended, nontender, normoactive bowel sounds, no masses    EXT: 2+ edema b/l LE. no cyanosis, nontender      CRITICAL CARE TIME SPENT:  minutes of critical care time spent providing medical care for patient's acute illness/conditions that impairs at least one vital organ system and/or poses a high risk of imminent or life threatening deterioration in the patient's condition. It includes time spent evaluating and treating the patient's acute illness as well as time spent reviewing labs, radiology, discussing goals of care with patient and/or patient's family, and discussing the case with a multidisciplinary team, including the eICU, in an effort to prevent further life threatening deterioration or end organ damage. This time is independent of any procedures performed.

## 2022-12-27 NOTE — PROGRESS NOTE ADULT - SUBJECTIVE AND OBJECTIVE BOX
STATUS POST:  excisional debridement sacral ulcer    POST OPERATIVE DAY: #1     Vital Signs Last 24 Hrs  T(C): 36.3 (27 Dec 2022 05:00), Max: 36.7 (26 Dec 2022 14:01)  T(F): 97.4 (27 Dec 2022 05:00), Max: 98.1 (26 Dec 2022 14:01)  HR: 82 (27 Dec 2022 05:00) (60 - 82)  BP: 107/65 (27 Dec 2022 05:00) (92/55 - 120/72)  BP(mean): 69 (26 Dec 2022 12:00) (67 - 69)  RR: 18 (27 Dec 2022 05:00) (12 - 20)  SpO2: 100% (27 Dec 2022 05:00) (100% - 100%)    Parameters below as of 27 Dec 2022 05:00  Patient On (Oxygen Delivery Method): nasal cannula  O2 Flow (L/min): 2    Patel:  Objective: receiving 1 unit PRBC for decrease Hgb 7.9; remains on Zosyn and heparin drip which was adjusted for supratherapeutic PTT (130, now 69.5).  SUBJECTIVE: non-English speaking  General Appearance: sleeping comfortably in fetal, contracted position, responds to tactile and noxious stimuli  Sacral wound dressing completely blood-soaked; dressing changed with pre-procedure morphine 2mg IVP given; sacral wound packing removed; no active bleeding noted by Dr. Iglesias; epinephrine soaked curlex 1mg/ml (1:1,000 in 1L NS dilution) applied to sacral wounds followed by sterile combines and wound vac clear adhesive dressing; pt tolerated procedure well     MEDICATIONS  (STANDING):  aspirin  chewable 81 milliGRAM(s) Oral daily  heparin  Infusion.  Unit(s)/Hr (8 mL/Hr) IV Continuous <Continuous>  midodrine 5 milliGRAM(s) Oral every 8 hours  pantoprazole  Injectable 40 milliGRAM(s) IV Push daily  piperacillin/tazobactam IVPB.. 3.375 Gram(s) IV Intermittent every 8 hours  polyethylene glycol 3350 17 Gram(s) Oral two times a day  senna 2 Tablet(s) Oral at bedtime    MEDICATIONS  (PRN):  acetaminophen     Tablet .. 650 milliGRAM(s) Oral every 6 hours PRN Temp greater or equal to 38C (100.4F), Mild Pain (1 - 3)  heparin   Injectable 3500 Unit(s) IV Push every 6 hours PRN For aPTT less than 40  heparin   Injectable 1500 Unit(s) IV Push every 6 hours PRN For aPTT between 40 - 57      LABS:                        7.9    13.02 )-----------( 204      ( 27 Dec 2022 07:25 )             24.0     12-27    135  |  103  |  25<H>  ----------------------------<  133<H>  4.2   |  25  |  1.03    Ca    7.7<L>      27 Dec 2022 07:25  Phos  4.0     12-27  Mg     1.8     12-27      PT/INR - ( 27 Dec 2022 07:25 )   PT: 16.0 sec;   INR: 1.37 ratio   PTT - ( 27 Dec 2022 07:25 )  PTT:69.5 sec       STATUS POST:  excisional debridement sacral ulcer    POST OPERATIVE DAY: #1     Vital Signs Last 24 Hrs  T(C): 36.3 (27 Dec 2022 05:00), Max: 36.7 (26 Dec 2022 14:01)  T(F): 97.4 (27 Dec 2022 05:00), Max: 98.1 (26 Dec 2022 14:01)  HR: 82 (27 Dec 2022 05:00) (60 - 82)  BP: 107/65 (27 Dec 2022 05:00) (92/55 - 120/72)  BP(mean): 69 (26 Dec 2022 12:00) (67 - 69)  RR: 18 (27 Dec 2022 05:00) (12 - 20)  SpO2: 100% (27 Dec 2022 05:00) (100% - 100%)    Parameters below as of 27 Dec 2022 05:00  Patient On (Oxygen Delivery Method): nasal cannula  O2 Flow (L/min): 2    Objective: receiving 1 unit PRBC for decrease Hgb 7.9; remains on Zosyn and heparin drip which was adjusted for supratherapeutic PTT (130, now 69.5).  SUBJECTIVE: non-English speaking; pt seen with attending Dr. Iglesias  General Appearance: sleeping comfortably in fetal, contracted position, responds to tactile and noxious stimuli  Sacral wound dressing completely blood-soaked; dressing changed with pre-procedure morphine 2mg IVP given; sacral wound packing removed; no active bleeding noted by Dr. Iglesias; epinephrine soaked curlex 1mg/ml (1:1,000 in 1L NS dilution) applied to sacral wounds followed by sterile combines and wound vac clear adhesive dressing; pt tolerated procedure well     MEDICATIONS  (STANDING):  aspirin  chewable 81 milliGRAM(s) Oral daily  heparin  Infusion.  Unit(s)/Hr (8 mL/Hr) IV Continuous <Continuous>  midodrine 5 milliGRAM(s) Oral every 8 hours  pantoprazole  Injectable 40 milliGRAM(s) IV Push daily  piperacillin/tazobactam IVPB.. 3.375 Gram(s) IV Intermittent every 8 hours  polyethylene glycol 3350 17 Gram(s) Oral two times a day  senna 2 Tablet(s) Oral at bedtime    MEDICATIONS  (PRN):  acetaminophen     Tablet .. 650 milliGRAM(s) Oral every 6 hours PRN Temp greater or equal to 38C (100.4F), Mild Pain (1 - 3)  heparin   Injectable 3500 Unit(s) IV Push every 6 hours PRN For aPTT less than 40  heparin   Injectable 1500 Unit(s) IV Push every 6 hours PRN For aPTT between 40 - 57      LABS:                        7.9    13.02 )-----------( 204      ( 27 Dec 2022 07:25 )             24.0     12-27    135  |  103  |  25<H>  ----------------------------<  133<H>  4.2   |  25  |  1.03    Ca    7.7<L>      27 Dec 2022 07:25  Phos  4.0     12-27  Mg     1.8     12-27      PT/INR - ( 27 Dec 2022 07:25 )   PT: 16.0 sec;   INR: 1.37 ratio   PTT - ( 27 Dec 2022 07:25 )  PTT:69.5 sec       STATUS POST:  excisional debridement sacral ulcer    POST OPERATIVE DAY: #1     Vital Signs Last 24 Hrs  T(C): 36.3 (27 Dec 2022 05:00), Max: 36.7 (26 Dec 2022 14:01)  T(F): 97.4 (27 Dec 2022 05:00), Max: 98.1 (26 Dec 2022 14:01)  HR: 82 (27 Dec 2022 05:00) (60 - 82)  BP: 107/65 (27 Dec 2022 05:00) (92/55 - 120/72)  BP(mean): 69 (26 Dec 2022 12:00) (67 - 69)  RR: 18 (27 Dec 2022 05:00) (12 - 20)  SpO2: 100% (27 Dec 2022 05:00) (100% - 100%)    Parameters below as of 27 Dec 2022 05:00  Patient On (Oxygen Delivery Method): nasal cannula  O2 Flow (L/min): 2    Objective: receiving 1 unit PRBC for decrease Hgb 7.9; remains on Zosyn and heparin drip which was adjusted for supratherapeutic PTT (130, now 69.5).  SUBJECTIVE: non-English speaking; pt seen with attending Dr. Iglesias  General Appearance: sleeping comfortably in fetal, contracted position, responds to tactile and noxious stimuli  Sacral wound dressing completely blood-soaked; dressing changed with pre-procedure morphine 2mg IVP given; sacral wound packing removed; no active bleeding noted by Dr. Iglesias; epinephrine soaked curlex 1mg/ml (1:1,000 in 500ml NS dilution) applied to sacral wounds followed by sterile combines and wound vac clear adhesive dressing; pt tolerated procedure well     MEDICATIONS  (STANDING):  aspirin  chewable 81 milliGRAM(s) Oral daily  heparin  Infusion.  Unit(s)/Hr (8 mL/Hr) IV Continuous <Continuous>  midodrine 5 milliGRAM(s) Oral every 8 hours  pantoprazole  Injectable 40 milliGRAM(s) IV Push daily  piperacillin/tazobactam IVPB.. 3.375 Gram(s) IV Intermittent every 8 hours  polyethylene glycol 3350 17 Gram(s) Oral two times a day  senna 2 Tablet(s) Oral at bedtime    MEDICATIONS  (PRN):  acetaminophen     Tablet .. 650 milliGRAM(s) Oral every 6 hours PRN Temp greater or equal to 38C (100.4F), Mild Pain (1 - 3)  heparin   Injectable 3500 Unit(s) IV Push every 6 hours PRN For aPTT less than 40  heparin   Injectable 1500 Unit(s) IV Push every 6 hours PRN For aPTT between 40 - 57      LABS:                        7.9    13.02 )-----------( 204      ( 27 Dec 2022 07:25 )             24.0     12-27    135  |  103  |  25<H>  ----------------------------<  133<H>  4.2   |  25  |  1.03    Ca    7.7<L>      27 Dec 2022 07:25  Phos  4.0     12-27  Mg     1.8     12-27      PT/INR - ( 27 Dec 2022 07:25 )   PT: 16.0 sec;   INR: 1.37 ratio   PTT - ( 27 Dec 2022 07:25 )  PTT:69.5 sec

## 2022-12-27 NOTE — CONSULT NOTE ADULT - ASSESSMENT
sacral decubitus with sepsis  oms  pulmonary embolism and dvt, without hemodynamic compromise  abnormal echo and ekg, c/w ant/apical mi, age indeterminate, possible apical thrombus, preserved lv function  no chf  elevated tn may be due to pulmonary embolism rather than acute mi      suggest  repeat ekg  repeat tn  aspirin full anticoagulation for pe/dvt as well as possible apical thrombus  statin  low dose b blocker    patient hemodynamically stable for or but at elevated cardiac risk due to above issues  not candidate for invasive cardiac testing/procedures    d/w dr lopez  
86yo female with stageIV sacral decub ulcer and local extension.  Likely septic from worsing infection and osteomyelitis.   would benefit from surgical interventions IF responds well to aggressive resuscitation and broad spectrum antibiotics.  In the meantime recommend bedside saline wash, collagenase and dressing.  progrnosis guarded,   will follow with medicine and ICU,   thank you for allow us to participate in this patients care.  Samer   [id number]
A/P 87-year-old female brought in by EMS to the ED by her daughter for a decubitus ulcer.  Patient was also short of breath per EMS.  Per patient's daughter she had a small ulcer that began 6 months ago, which they were doing local wound care for, however she reports she has not seen the ulcer in 2 weeks, when she looked at it today she saw that it was large and very deep.  As per daughter, patient has been declining in health for the past year, last seen by pmd 18 months ago. Patient was ambulating with poor balance, became incontinent over 6 months ago. Admitted for sepsis likely from wound.       Patient underwent a surgical debridement of the sacral decubiti yesterday. Post-operatively, heparin infusion was restarted. Blood work was drawn and coags were followed. Patient was being cleaned early this morning, nursing staff found pt with a significant amount of blood in the bed.   The surgical site padding was fully soaked. A call was then placed to the surgical attending . Pt s/p 1 U prbc's     Assessment/Plans:      Septic shock due to Large sacral decubiti with lactic acidosis   Right femoral vein DVT   Bilateral pulmonary emboli    Elevated troponin levels suspect from PE   Colon cancer- remote    Severe Protein malnutrition with low hypoalbuminemia   S/p sacral debridement     - Continue current management  - Heparin infusion was restarted post-op, but held off due to bleed.   - Sacral decubiti debridement yesterday, repacked and dressed by surgery today  - Scheduled for washout of sacral decubiti tomorrow in the OR   -pain management as per surgery  -Transfuse 1 unit PRBC    -NPO after midnight for surgery tomorrow.   - Family does not want vasopressors      Palliative :   asked for Goc assist, case d/w ccu team this AM, and chart reviewed. Pt seen bedside this AM, she was resting comfortably. Wakes w/ stimulation , did not follow simple commands, spoke few words, mumbled.   Called and spoke w/ pts daughter Bruno , she reviewed w/ me some of pts pmhx as well as discussed pts declining status , due to her progressing dementia. Pt is eating less, and less mobile, and now has a lrg sacral decub.  which is likely infected.   Daughter wishes to treat the infection and  also wants to focus on pts comfort .  See GOC note above.   I reviewed/recommended  home hospice to daughter today and she agrees to speak with them.  Will place home hospice consult.

## 2022-12-27 NOTE — PROGRESS NOTE ADULT - ASSESSMENT
Full note pending 88 yo F hx colon CA, dementia, bedbound, necrotizing decubitus ulcer with extension to gluteal area and some epidural air at level of sacrum as well with growth of strep species and bacteroides from the blood.   She also has pe and dvt.   Now s/p excisional debridement of sacral decub POD#1  Recommendations:  continue zosyn for now  Will f/u blood and urine cultures  local wound care as per surgery

## 2022-12-27 NOTE — CONSULT NOTE ADULT - CONSULT REASON
pre op cardiac assessment
infected decubitus and polymicrobic bacteremia
goc /assist
sacral decubitus ulcer

## 2022-12-27 NOTE — PROVIDER CONTACT NOTE (OTHER) - ACTION/TREATMENT ORDERED:
heparin drip stopped per order.  1 unit PRBC transfusing per order.  VSS q5min.  tegaderm dressing to remain in place per PA. heparin drip stopped per order.  STAT CBC, PTT, type and screen ordered and obtained.   1 unit PRBC transfusing per order.  VSS q5min.  tegaderm dressing to remain in place per PA.

## 2022-12-27 NOTE — PROGRESS NOTE ADULT - ASSESSMENT
Events last 24 hours:   Patient remains afebrile, tolerating n/c,  no events overnight. She is scheduled for sacral debridement this morning. NPO overnight, heparin infusion turned off. She has no complaints.       PAST MEDICAL & SURGICAL HISTORY:  Colon cancer  Dementia  S/P colon resection      Medications:  piperacillin/tazobactam IVPB.. 3.375 Gram(s) IV Intermittent every 8 hours  midodrine 5 milliGRAM(s) Oral every 8 hours  acetaminophen     Tablet .. 650 milliGRAM(s) Oral every 6 hours PRN  aspirin  chewable 81 milliGRAM(s) Oral daily  pantoprazole  Injectable 40 milliGRAM(s) IV Push daily  polyethylene glycol 3350 17 Gram(s) Oral two times a day  senna 2 Tablet(s) Oral at bedtime  lactated ringers. 1000 milliLiter(s) IV Continuous <Continuous>  collagenase Ointment 1 Application(s) Topical daily            ICU Vital Signs Last 24 Hrs  T(C): 36.6 (26 Dec 2022 08:23), Max: 36.7 (25 Dec 2022 14:32)  T(F): 97.8 (26 Dec 2022 08:23), Max: 98 (25 Dec 2022 14:32)  HR: 69 (26 Dec 2022 08:23) (65 - 80)  BP: 113/86 (26 Dec 2022 08:23) (91/50 - 116/69)  BP(mean): --  ABP: --  ABP(mean): --  RR: 21 (26 Dec 2022 08:23) (16 - 21)  SpO2: 100% (26 Dec 2022 08:23) (100% - 100%)    O2 Parameters below as of 26 Dec 2022 05:26  Patient On (Oxygen Delivery Method): nasal cannula  O2 Flow (L/min): 2      ABG - ( 24 Dec 2022 14:45 )  pH, Arterial: 7.49  pH, Blood: x     /  pCO2: 27    /  pO2: 289   / HCO3: 21    / Base Excess: -2.7  /  SaO2: 99.2            I&O's Detail    25 Dec 2022 07:01  -  26 Dec 2022 07:00  --------------------------------------------------------  IN:    Heparin Infusion: 84 mL    IV PiggyBack: 100 mL    Lactated Ringers: 1500 mL    Oral Fluid: 250 mL  Total IN: 1934 mL    OUT:    Voided (mL): 530 mL  Total OUT: 530 mL    Total NET: 1404 mL            LABS:                        9.1    10.13 )-----------( 199      ( 26 Dec 2022 06:15 )             28.3     12-25    135  |  102  |  22  ----------------------------<  99  3.6   |  24  |  0.82    Ca    7.4<L>      25 Dec 2022 06:28  Phos  3.7     12  Mg     1.9         TPro  4.7<L>  /  Alb  1.3<L>  /  TBili  0.5  /  DBili  x   /  AST  41<H>  /  ALT  33  /  AlkPhos  142<H>  25      CARDIAC MARKERS ( 24 Dec 2022 16:48 )  x     / x     / 218 U/L / x     / 5.3 ng/mL      CAPILLARY BLOOD GLUCOSE        PT/INR - ( 26 Dec 2022 06:15 )   PT: 16.3 sec;   INR: 1.40 ratio         PTT - ( 26 Dec 2022 06:15 )  PTT:31.9 sec  Urinalysis Basic - ( 24 Dec 2022 14:42 )    Color: Yellow / Appearance: Clear / S.015 / pH: x  Gluc: x / Ketone: Negative  / Bili: Small / Urobili: 4   Blood: x / Protein: 30 mg/dL / Nitrite: Negative   Leuk Esterase: Trace / RBC: 26-50 /HPF / WBC 3-5 /HPF   Sq Epi: x / Non Sq Epi: Neg.-Few / Bacteria: Negative /HPF      CULTURES:  Culture Results:   No growth (22 @ 14:42)  Rapid RVP Result: NotDetec (22 @ 11:47)  Culture Results:   Growth in anaerobic bottle: Gram Positive Cocci in Pairs and Chains  Growth in aerobic bottle: Gram Positive Cocci in Pairs and Chains  ***Blood Panel PCR results on this specimen are available  approximately 3 hours after the Gram stain result.***  Gram stain, PCR, and/or culture results may not always  correspond due to difference in methodologies.  ************************************************************  This PCR assay was performed by multiplex PCR. This  Assay tests for 66 bacterial and resistance gene targets.  Please refer to the [facility] [employer] test directory  at [email or web address] for details. (22 @ 11:34)  Culture Results:   Growth in anaerobic bottle: Gram positive cocci in pairs and Gram  Negative Rods  ***Blood Panel PCR results on this specimen are available  approximately 3 hours after the Gram stain result.***  Gram stain, PCR, and/or culture results may not always  correspond due to difference in methodologies.  ************************************************************  This PCR assay was performed by multiplex PCR. This  Assay tests for 66 bacterial and resistance gene targets.  Please refer to the [facility] [employer] test directory  at [email or web address] for details. (22 @ 11:34)      Physical Examination:    General: Awake with dyspnea on exertion.  Alert, oriented, interactive, minimally verbal.    HEENT: Pupils equal, reactive to light.  Symmetric.    PULM: Diminished to auscultation bilaterally, no significant sputum production    CVS: Regular rate and rhythm, no murmurs, rubs, or gallops    ABD: Soft, nondistended, nontender, normoactive bowel sounds, no masses    EXT: 2+ edema b/l LE. no cyanosis, nontender        CRITICAL CARE TIME SPENT:  minutes of critical care time spent providing medical care for patient's acute illness/conditions that impairs at least one vital organ system and/or poses a high risk of imminent or life threatening deterioration in the patient's condition. It includes time spent evaluating and treating the patient's acute illness as well as time spent reviewing labs, radiology, discussing goals of care with patient and/or patient's family, and discussing the case with a multidisciplinary team, including the eICU, in an effort to prevent further life threatening deterioration or end organ damage. This time is independent of any procedures performed.          Assessment and Plan:   · Assessment	  87-year-old female brought in by EMS to the ED by her daughter for a decubitus ulcer.  Patient was also short of breath per EMS.  Per patient's daughter she had a small ulcer that began 6 months ago, which they were doing local wound care for, however she reports she has not seen the ulcer in 2 weeks, when she looked at it today she saw that it was large and very deep.  As per daughter, patient has been declining in health for the past year, last seen by pmd 18 months ago. Patient was ambulating with poor balance, became incontinent over 6 months ago. She developed and a sacral ulcer while wearing a diaper and being less mobile. She eventually became more sedentary, bedbound about  2 weeks ago. Sacral ulcer soon developed after that and was noticed by daughter as the size of a coin. Daughter noticed today the ulcer became very large. There was no reported fever, chills no chest pain no nausea vomiting.    1. Septic shock due to Large sacral decubiti with lactic acidosis  2. Right femoral vein DVT  3. Bilateral pulmonary emboli   4. Elevated troponin levels suspect from PE  5. Colon cancer  6. Severe Protein malnutrition with low hypoalbuminemia     - Continue current management  - Heparin infusion turned off this AM - preop, will restart post op   - Scheduled for sacral decubiti debridement in the OR  - Continue supplemental oxygen via nasal cannula  - monitor oxygen saturation, BP, heart rate  - Continue antibiotics, IVF hydration with lactated ringer's solution  - Continue Midodrine for borderline bp  - Monitor urine output, kidney function  - Tolerating diet well. Continue nutrition requirement post-operatively  - Family does not want vasopressors  - Repeat EKG, chest xray, ABG as needed  - Follow up cultures, AM labs  - GI and DVT prophylaxis  - Patient is at risk for sudden death, poor prognosis  - Palliative care evaluation pending  - Antibiotics as per ID - consult appreciated  - Patient is DNR/DNI with [facility]OLST in chart  - Case d/w dtr   87-year-old female brought in by EMS to the ED by her daughter for a decubitus ulcer.  Patient was also short of breath per EMS.  Per patient's daughter she had a small ulcer that began 6 months ago, which they were doing local wound care for, however she reports she has not seen the ulcer in 2 weeks, when she looked at it today she saw that it was large and very deep.  As per daughter, patient has been declining in health for the past year, last seen by pmd 18 months ago. Patient was ambulating with poor balance, became incontinent over 6 months ago. She developed and a sacral ulcer while wearing a diaper and being less mobile. She eventually became more sedentary, bedbound about  2 weeks ago. Sacral ulcer soon developed after that and was noticed by daughter as the size of a coin. Daughter noticed today the ulcer became very large. There was no reported fever, chills no chest pain no nausea vomiting.    1. Septic shock due to Large sacral decubiti with lactic acidosis  2. Right femoral vein DVT  3. Bilateral pulmonary emboli   4. Elevated troponin levels suspect from PE  5. Colon cancer  6. Severe Protein malnutrition with low hypoalbuminemia  7. S/p sacral debridement     - Continue current management  - Heparin infusion was restarted post-op, but held off due to bleed.   - Sacral decubiti debridement yesterday, repacked and dressed by surgery today  - Scheduled for washout of sacral decubiti tomorrow in the OR  - Continue pain management as per surgery  - Continue supplemental oxygen via nasal cannula  - monitor oxygen saturation, BP, heart rate  - Continue antibiotics, IVF hydration with lactated ringer's solution  - Continue Midodrine for borderline bp  - Transfuse 1 unit PRBC   - Follow up post-transfusion CBC  - Restart heparin infusion, follow up coags  - Monitor urine output, kidney function  - NPO after midnight for surgery tomorrow.   - Family does not want vasopressors  - Repeat EKG, chest xray, ABG as needed  - + blood cultures cultures, ABX as per ID  - GI and DVT prophylaxis  - Patient is at risk for sudden death, poor prognosis  - Patient is DNR/DNI with [facility]OL[facility] in chart  - Case d/w dtr   87-year-old female brought in by EMS to the ED by her daughter for a decubitus ulcer.  Patient was also short of breath per EMS.  Per patient's daughter she had a small ulcer that began 6 months ago, which they were doing local wound care for, however she reports she has not seen the ulcer in 2 weeks, when she looked at it today she saw that it was large and very deep.  As per daughter, patient has been declining in health for the past year, last seen by pmd 18 months ago. Patient was ambulating with poor balance, became incontinent over 6 months ago. She developed and a sacral ulcer while wearing a diaper and being less mobile. She eventually became more sedentary, bedbound about  2 weeks ago. Sacral ulcer soon developed after that and was noticed by daughter as the size of a coin. Daughter noticed today the ulcer became very large. There was no reported fever, chills no chest pain no nausea vomiting.    1. Septic shock due to Large sacral decubiti with lactic acidosis  2. Right femoral vein DVT  3. Bilateral pulmonary emboli   4. Elevated troponin levels suspect from PE  5. Colon cancer  6. Severe Protein malnutrition with low hypoalbuminemia  7. S/p sacral debridement     - Continue current management  - Heparin infusion was restarted post-op, but held off due to bleed.   - Sacral decubiti debridement yesterday, repacked and dressed by surgery today  - Scheduled for washout of sacral decubiti tomorrow in the OR  - Continue pain management as per surgery  - Continue supplemental oxygen via nasal cannula  - monitor oxygen saturation, BP, heart rate  - Continue antibiotics, IVF hydration with lactated ringer's solution  - Continue Midodrine for borderline bp  - Transfuse 1 unit PRBC   - Follow up post-transfusion CBC  - Restart heparin infusion, follow up coags  - Monitor urine output, kidney function  - NPO after midnight for surgery tomorrow.   - Family does not want vasopressors  - Repeat EKG, chest xray, ABG as needed  - + blood cultures cultures, ABX as per ID  - GI and DVT prophylaxis  - Patient is at risk for sudden death, poor prognosis  - Patient is DNR/DNI with [facility]OL[facility] in chart  - Case d/w dtr, Bruno Dean ([phone]) - updated 12/27/22

## 2022-12-27 NOTE — DISCHARGE NOTE NURSING/CASE MANAGEMENT/SOCIAL WORK - PATIENT PORTAL LINK FT
You can access the FollowMyHealth Patient Portal offered by [facility] by registering at the following website: [email or web address]. By joining [employer]’s FollowMyHealth portal, you will also be able to view your health information using other applications (apps) compatible with our system. Is This A New Presentation, Or A Follow-Up?: Skin Lesion How Severe Is Your Skin Lesion?: mild Has Your Skin Lesion Been Treated?: not been treated Additional History: She would like to have it removed today

## 2022-12-27 NOTE — PROGRESS NOTE ADULT - SUBJECTIVE AND OBJECTIVE BOX
HPI: 86 y/o F w/ pmh of dementia, colon ca s/p resection, presented to [facility] on 12/24/2022 for worsening DU and increased weakness, pt was admitted septic shock 2/2 to large sacral DU w/ lactic acidosis, R. femoral vein DVT, b/l PE.    24 hour events: pt now s/p surgical debridement, post-op pt was restarted on heparin gtt yesterday today morning pt was being cleaned and staff found the pt covered in blood, the surgical site padding is fully soaked. attempted to call surgical attending dr. aguilar but unable to reach     Review of Systems: Unable to obtain 2/2 to dementia    T(F): 97.4 (12-27-22 @ 05:00), Max: 98.1 (12-26-22 @ 14:01)  HR: 82 (12-27-22 @ 05:00) (60 - 82)  BP: 107/65 (12-27-22 @ 05:00) (92/55 - 120/72)  RR: 18 (12-27-22 @ 05:00) (12 - 21)  SpO2: 100% (12-27-22 @ 05:00) (100% - 100%)  Wt(kg): --      12-25-22 @ 07:01  -  12-26-22 @ 07:00  --------------------------------------------------------  IN: 1934 mL / OUT: 530 mL / NET: 1404 mL    12-26-22 @ 07:01  -  12-27-22 @ 06:15  --------------------------------------------------------  IN: 0 mL / OUT: 500 mL / NET: -500 mL        CAPILLARY BLOOD GLUCOSE          I&O's Summary    25 Dec 2022 07:01  -  26 Dec 2022 07:00  --------------------------------------------------------  IN: 1934 mL / OUT: 530 mL / NET: 1404 mL    26 Dec 2022 07:01  -  27 Dec 2022 06:15  --------------------------------------------------------  IN: 0 mL / OUT: 500 mL / NET: -500 mL        Physical Exam:   Gen: Comfortable in bed in NAD  Neuro: awake but confused at baseline   HEENT: NC/AT  Resp: good air entry b/l  CVS: +RRR  Abd: BSx4, soft, ND   Ext: no edema   Skin: warm/dry, back DU with dressing soaked in fresh red blood     Meds:  piperacillin/tazobactam IVPB.. IV Intermittent    midodrine Oral        acetaminophen     Tablet .. Oral PRN      aspirin  chewable Oral  heparin   Injectable IV Push PRN  heparin   Injectable IV Push PRN  heparin  Infusion. IV Continuous    pantoprazole  Injectable IV Push  polyethylene glycol 3350 Oral  senna Oral                            9.4    13.69 )-----------( 215      ( 26 Dec 2022 23:35 )             28.8       12-25    135  |  102  |  22  ----------------------------<  99  3.6   |  24  |  0.82    Ca    7.4<L>      25 Dec 2022 06:28  Phos  3.7     12-25  Mg     1.9     12-25    TPro  4.7<L>  /  Alb  1.3<L>  /  TBili  0.5  /  DBili  x   /  AST  41<H>  /  ALT  33  /  AlkPhos  142<H>  12-25          PT/INR - ( 26 Dec 2022 06:15 )   PT: 16.3 sec;   INR: 1.40 ratio         PTT - ( 26 Dec 2022 23:35 )  PTT:127.0 sec    Bone SACRAL #4 --   No polymorphonuclear leukocytes seen per low power field  Numerous Gram positive cocci in pairs seen per oil power field  Numerous Gram Variable Rods seen per oil power field 12-26 @ 11:47  Bone SACRAL #3 --   No polymorphonuclear leukocytes seen per low power field  Rare Gram positive cocci in pairs seen per oil power field  Rare Gram Variable Rods seen per oil power field 12-26 @ 11:45  Bone SACRAL #2 --   No polymorphonuclear leukocytes seen per low power field  Moderate Gram positive cocci in pairs seen per oil power field  Moderate Gram Variable Rods seen per oil power field 12-26 @ 11:42  Bone sacral bone --   No polymorphonuclear leukocytes seen per low power field  Few Gram positive cocci in pairs seen per oil power field  Few Gram Variable Rods seen per oil power field 12-26 @ 11:34  Clean Catch Clean Catch (Midstream)   No growth -- 12-24 @ 14:42  .Blood Blood-Peripheral   Growth in aerobic and anaerobic bottles: Streptococcus anginosus Alpha  hemolytic strep  (not Strep. pneumoniae or Enterococcus)  Single set isolate, possible contaminant. Contact  Microbiology if susceptibility testing clinically  indicated.  Growth in anaerobic bottle: Bacteroides fragilis "Susceptibilities not  performed"  ***Blood Panel PCR results on this specimen are available  approximately 3 hours after the Gram stain result.***  Gram stain, PCR, and/or culture results may not always  correspond due to difference in methodologies.  ************************************************************  This PCR assay was performed by multiplex PCR. This  Assay tests for 66 bacterial and resistance gene targets.  Please refer to the [facility] test directory  at [email or web address] for details.   Upon re-evaluation of gram stain:  Growth in aerobic and anaerobic bottles: Gram Positive Cocci in Pairs and  Chains  Growth in anaerobic bottle: Gram Negative Rods  upon repeat smear after prolonged incubation  Previously reported as:  Growth in aerobic and anaerobic bottles: Gram Positive Cocci in Pairs and  Chains 12-24 @ 11:34      Rapid RVP Result: NotDetec (12-24 @ 11:47)        Radiology:     < from: CT Chest w/ IV Cont (12.24.22 @ 13:58) >  ACC: [id number] EXAM:  CT ABDOMEN AND PELVIS IC                        ACC: [id number] EXAM:  CT CHEST IC                          PROCEDURE DATE:  12/24/2022          INTERPRETATION:  CLINICAL INFORMATION: Shortness of breath, evaluate for   DVT. Fever, assess for sacral decubitus ulceration.    COMPARISON: None.    CONTRAST/COMPLICATIONS:  IV Contrast: Omnipaque 350 (accession [id number]), IV contrast documented   in unlinked concurrent exam (accession [id number])  90 cc administered   10   cc discarded  Oral Contrast: NONE  Complications: None reported at time of study completion    PROCEDURE:  CT Angiography of the Chest was performed followed by portal venous phase   imaging of the Abdomen and Pelvis.  Sagittal and coronal reformats were performed as well as 3D (MIP)   reconstructions.    FINDINGS:  CHEST:  LUNGS AND LARGE AIRWAYS: Patent central airways. Opacity within the   inferior/posterior aspect of the right lower lobe may represent   atelectasis versus infiltrate.  PLEURA: Small bilateral pleural effusions.  VESSELS: Bilateral pulmonary emboli identified.  HEART: Heart size is normal. No pericardial effusion.  MEDIASTINUM AND SOLITARIO: No lymphadenopathy.  CHEST WALL AND LOWER NECK: Hypodense nodules measuring up to 7 mm   identified within the visualized portion of the thyroid parenchyma.    ABDOMEN AND PELVIS:  LIVER: Within normal limits.  BILE DUCTS: Normal caliber.  GALLBLADDER: Not visualized.  SPLEEN: Within normal limits.  PANCREAS: Within normal limits.  ADRENALS: Within normal limits.  KIDNEYS/URETERS: No hydronephrosis. No renal calculi. Subcentimeter   hypodense foci are noted within the bilateral renal parenchyma, too small   to characterize.    BLADDER: Within normal limits.  REPRODUCTIVE ORGANS: Uterus and adnexa within normal limits.    BOWEL: Abundant fecal material scattered throughout the colon and rectum.   An element of fecal rectal impaction cannot be fully excluded. Appendix   is not visualized.  PERITONEUM: No ascites.  VESSELS: Thrombus is identified within the right femoral vein as well as   deep femoral vein consistent with DVT.  RETROPERITONEUM/LYMPH NODES: No lymphadenopathy.  ABDOMINAL WALL: There is a marginally enhancing air/fluid collection,   which may be multiloculated measuring 4.5x 2.0 cm within the left   gluteal musculature. Subcutaneous air is identified overlying the   inferior sacrum and medial right gluteal region. Diffuse anasarca.  BONES: Droplets of epidural air are identified at the level of the sacral   canal; the patient is status post laminectomy at L4-L5. Differential   considerations of the epidural air would include extension of the sacral   decubitus infection versus secondary to degenerative change.    IMPRESSION:  1. Bilateral pulmonary emboli. Thrombusis identified within the right   femoral vein as well as deep femoral vein consistent with DVT.  2. There is a marginally enhancing air/fluid collection, which may be   multiloculated measuring 4.5 x 2.0 cm within the left gluteal   musculature. Subcutaneous air is identified overlying the inferior sacrum   and medial right gluteal region.  3. Droplets of epidural air are identified at the level of the sacral   canal; the patient is status post laminectomy at L4-L5. Differential   considerations of the epidural air would include extension of the sacral   decubitus infection versus secondary to degenerative change.      Findings were discussed with Dr. CHILEL UROVIS [phone] 12/24/2022   2:44 PM by Dr. Odell with read back confirmation.    --- End of Report ---          ELENI ODELL MD; Attending Radiologist  This document has been electronically signed. Dec 24 2022  2:45PM    < end of copied text >      CODE STATUS: DNR/DNI

## 2022-12-28 ENCOUNTER — TRANSCRIPTION ENCOUNTER (OUTPATIENT)
Age: 87
End: 2022-12-28

## 2022-12-28 LAB
-  AMIKACIN: SIGNIFICANT CHANGE UP
-  AMOXICILLIN/CLAVULANIC ACID: SIGNIFICANT CHANGE UP
-  AMPICILLIN/SULBACTAM: SIGNIFICANT CHANGE UP
-  AMPICILLIN: SIGNIFICANT CHANGE UP
-  AZTREONAM: SIGNIFICANT CHANGE UP
-  CEFAZOLIN: SIGNIFICANT CHANGE UP
-  CEFEPIME: SIGNIFICANT CHANGE UP
-  CEFOXITIN: SIGNIFICANT CHANGE UP
-  CEFTRIAXONE: SIGNIFICANT CHANGE UP
-  CIPROFLOXACIN: SIGNIFICANT CHANGE UP
-  ERTAPENEM: SIGNIFICANT CHANGE UP
-  GENTAMICIN: SIGNIFICANT CHANGE UP
-  IMIPENEM: SIGNIFICANT CHANGE UP
-  LEVOFLOXACIN: SIGNIFICANT CHANGE UP
-  MEROPENEM: SIGNIFICANT CHANGE UP
-  PIPERACILLIN/TAZOBACTAM: SIGNIFICANT CHANGE UP
-  TOBRAMYCIN: SIGNIFICANT CHANGE UP
-  TRIMETHOPRIM/SULFAMETHOXAZOLE: SIGNIFICANT CHANGE UP
APTT BLD: 32.1 SEC — SIGNIFICANT CHANGE UP (ref 27.5–35.5)
APTT BLD: 47 SEC — HIGH (ref 27.5–35.5)
APTT BLD: 64 SEC — HIGH (ref 27.5–35.5)
CULTURE RESULTS: SIGNIFICANT CHANGE UP
HCT VFR BLD CALC: 27.1 % — LOW (ref 34.5–45)
HGB BLD-MCNC: 8.8 G/DL — LOW (ref 11.5–15.5)
MCHC RBC-ENTMCNC: 29.1 PG — SIGNIFICANT CHANGE UP (ref 27–34)
MCHC RBC-ENTMCNC: 32.5 GM/DL — SIGNIFICANT CHANGE UP (ref 32–36)
MCV RBC AUTO: 89.7 FL — SIGNIFICANT CHANGE UP (ref 80–100)
METHOD TYPE: SIGNIFICANT CHANGE UP
NRBC # BLD: 0 /100 WBCS — SIGNIFICANT CHANGE UP (ref 0–0)
ORGANISM # SPEC MICROSCOPIC CNT: SIGNIFICANT CHANGE UP
PLATELET # BLD AUTO: 163 K/UL — SIGNIFICANT CHANGE UP (ref 150–400)
RBC # BLD: 3.02 M/UL — LOW (ref 3.8–5.2)
RBC # FLD: 14.1 % — SIGNIFICANT CHANGE UP (ref 10.3–14.5)
SPECIMEN SOURCE: SIGNIFICANT CHANGE UP
SPECIMEN SOURCE: SIGNIFICANT CHANGE UP
WBC # BLD: 9.47 K/UL — SIGNIFICANT CHANGE UP (ref 3.8–10.5)
WBC # FLD AUTO: 9.47 K/UL — SIGNIFICANT CHANGE UP (ref 3.8–10.5)

## 2022-12-28 PROCEDURE — 15274 SKN SUB GRFT T/A/L CHILD ADD: CPT

## 2022-12-28 PROCEDURE — 15273 SKIN SUB GRFT T/ARM/LG CHILD: CPT

## 2022-12-28 DEVICE — ARISTA 3GR: Type: IMPLANTABLE DEVICE | Status: FUNCTIONAL

## 2022-12-28 DEVICE — PARTICLE POWDER MATRISTEM MICROMATRIX 1000MG: Type: IMPLANTABLE DEVICE | Status: FUNCTIONAL

## 2022-12-28 DEVICE — CYTAL WOUND MATRIX 6-LAYER 10X15CM: Type: IMPLANTABLE DEVICE | Status: FUNCTIONAL

## 2022-12-28 RX ORDER — HYDROMORPHONE HYDROCHLORIDE 2 MG/ML
0.25 INJECTION INTRAMUSCULAR; INTRAVENOUS; SUBCUTANEOUS
Refills: 0 | Status: DISCONTINUED | OUTPATIENT
Start: 2022-12-28 | End: 2022-12-28

## 2022-12-28 RX ORDER — ONDANSETRON 8 MG/1
4 TABLET, FILM COATED ORAL ONCE
Refills: 0 | Status: DISCONTINUED | OUTPATIENT
Start: 2022-12-28 | End: 2022-12-28

## 2022-12-28 RX ORDER — SODIUM CHLORIDE 9 MG/ML
1000 INJECTION, SOLUTION INTRAVENOUS
Refills: 0 | Status: DISCONTINUED | OUTPATIENT
Start: 2022-12-28 | End: 2022-12-28

## 2022-12-28 RX ORDER — POLYETHYLENE GLYCOL 3350 17 G/17G
17 POWDER, FOR SOLUTION ORAL
Refills: 0 | Status: DISCONTINUED | OUTPATIENT
Start: 2022-12-28 | End: 2023-01-05

## 2022-12-28 RX ORDER — HEPARIN SODIUM 5000 [USP'U]/ML
1500 INJECTION INTRAVENOUS; SUBCUTANEOUS EVERY 6 HOURS
Refills: 0 | Status: DISCONTINUED | OUTPATIENT
Start: 2022-12-28 | End: 2022-12-29

## 2022-12-28 RX ORDER — HYDROMORPHONE HYDROCHLORIDE 2 MG/ML
0.5 INJECTION INTRAMUSCULAR; INTRAVENOUS; SUBCUTANEOUS
Refills: 0 | Status: DISCONTINUED | OUTPATIENT
Start: 2022-12-28 | End: 2022-12-28

## 2022-12-28 RX ORDER — SENNA PLUS 8.6 MG/1
2 TABLET ORAL AT BEDTIME
Refills: 0 | Status: DISCONTINUED | OUTPATIENT
Start: 2022-12-28 | End: 2023-01-05

## 2022-12-28 RX ORDER — ASPIRIN/CALCIUM CARB/MAGNESIUM 324 MG
81 TABLET ORAL DAILY
Refills: 0 | Status: DISCONTINUED | OUTPATIENT
Start: 2022-12-28 | End: 2023-01-05

## 2022-12-28 RX ORDER — HEPARIN SODIUM 5000 [USP'U]/ML
INJECTION INTRAVENOUS; SUBCUTANEOUS
Qty: 25000 | Refills: 0 | Status: DISCONTINUED | OUTPATIENT
Start: 2022-12-28 | End: 2022-12-29

## 2022-12-28 RX ORDER — PANTOPRAZOLE SODIUM 20 MG/1
40 TABLET, DELAYED RELEASE ORAL DAILY
Refills: 0 | Status: DISCONTINUED | OUTPATIENT
Start: 2022-12-28 | End: 2022-12-29

## 2022-12-28 RX ORDER — PIPERACILLIN AND TAZOBACTAM 4; .5 G/20ML; G/20ML
3.38 INJECTION, POWDER, LYOPHILIZED, FOR SOLUTION INTRAVENOUS EVERY 8 HOURS
Refills: 0 | Status: DISCONTINUED | OUTPATIENT
Start: 2022-12-28 | End: 2023-01-02

## 2022-12-28 RX ORDER — ACETAMINOPHEN 500 MG
650 TABLET ORAL EVERY 6 HOURS
Refills: 0 | Status: DISCONTINUED | OUTPATIENT
Start: 2022-12-28 | End: 2023-01-05

## 2022-12-28 RX ORDER — HEPARIN SODIUM 5000 [USP'U]/ML
3500 INJECTION INTRAVENOUS; SUBCUTANEOUS EVERY 6 HOURS
Refills: 0 | Status: DISCONTINUED | OUTPATIENT
Start: 2022-12-28 | End: 2022-12-29

## 2022-12-28 RX ORDER — BRIMONIDINE TARTRATE 2 MG/MG
1 SOLUTION/ DROPS OPHTHALMIC EVERY 12 HOURS
Refills: 0 | Status: DISCONTINUED | OUTPATIENT
Start: 2022-12-28 | End: 2023-01-05

## 2022-12-28 RX ORDER — MIDODRINE HYDROCHLORIDE 2.5 MG/1
5 TABLET ORAL EVERY 8 HOURS
Refills: 0 | Status: DISCONTINUED | OUTPATIENT
Start: 2022-12-28 | End: 2023-01-05

## 2022-12-28 RX ORDER — DORZOLAMIDE HYDROCHLORIDE TIMOLOL MALEATE 20; 5 MG/ML; MG/ML
1 SOLUTION/ DROPS OPHTHALMIC EVERY 12 HOURS
Refills: 0 | Status: DISCONTINUED | OUTPATIENT
Start: 2022-12-28 | End: 2023-01-05

## 2022-12-28 RX ADMIN — MIDODRINE HYDROCHLORIDE 5 MILLIGRAM(S): 2.5 TABLET ORAL at 14:14

## 2022-12-28 RX ADMIN — BRIMONIDINE TARTRATE 1 DROP(S): 2 SOLUTION/ DROPS OPHTHALMIC at 05:35

## 2022-12-28 RX ADMIN — PANTOPRAZOLE SODIUM 40 MILLIGRAM(S): 20 TABLET, DELAYED RELEASE ORAL at 14:13

## 2022-12-28 RX ADMIN — HEPARIN SODIUM 800 UNIT(S)/HR: 5000 INJECTION INTRAVENOUS; SUBCUTANEOUS at 17:22

## 2022-12-28 RX ADMIN — PIPERACILLIN AND TAZOBACTAM 25 GRAM(S): 4; .5 INJECTION, POWDER, LYOPHILIZED, FOR SOLUTION INTRAVENOUS at 05:37

## 2022-12-28 RX ADMIN — PIPERACILLIN AND TAZOBACTAM 25 GRAM(S): 4; .5 INJECTION, POWDER, LYOPHILIZED, FOR SOLUTION INTRAVENOUS at 21:21

## 2022-12-28 RX ADMIN — HEPARIN SODIUM 700 UNIT(S)/HR: 5000 INJECTION INTRAVENOUS; SUBCUTANEOUS at 06:04

## 2022-12-28 RX ADMIN — DORZOLAMIDE HYDROCHLORIDE TIMOLOL MALEATE 1 DROP(S): 20; 5 SOLUTION/ DROPS OPHTHALMIC at 21:20

## 2022-12-28 RX ADMIN — POLYETHYLENE GLYCOL 3350 17 GRAM(S): 17 POWDER, FOR SOLUTION ORAL at 17:22

## 2022-12-28 RX ADMIN — MIDODRINE HYDROCHLORIDE 5 MILLIGRAM(S): 2.5 TABLET ORAL at 05:39

## 2022-12-28 RX ADMIN — BRIMONIDINE TARTRATE 1 DROP(S): 2 SOLUTION/ DROPS OPHTHALMIC at 21:19

## 2022-12-28 RX ADMIN — HEPARIN SODIUM 900 UNIT(S)/HR: 5000 INJECTION INTRAVENOUS; SUBCUTANEOUS at 23:48

## 2022-12-28 RX ADMIN — SENNA PLUS 2 TABLET(S): 8.6 TABLET ORAL at 21:19

## 2022-12-28 RX ADMIN — MIDODRINE HYDROCHLORIDE 5 MILLIGRAM(S): 2.5 TABLET ORAL at 21:19

## 2022-12-28 RX ADMIN — PIPERACILLIN AND TAZOBACTAM 25 GRAM(S): 4; .5 INJECTION, POWDER, LYOPHILIZED, FOR SOLUTION INTRAVENOUS at 14:14

## 2022-12-28 RX ADMIN — Medication 81 MILLIGRAM(S): at 14:14

## 2022-12-28 NOTE — CHART NOTE - NSCHARTNOTEFT_GEN_A_CORE
Nutrition Follow Up Note  Hospital Course (Per Electronic Medical Record):   Source: Medical Record [X] Patient [X] Family [X] Nursing Staff [X]     Diet: NPO     Patient note NPO , pending OR for wound wash-out , s/p wound debridement yesterday (12/27) , PO intake was noted poor & dx of severe protein calorie malnutrition noted , patient was consuming between 25-50% of meals as per nursing flow sheet , Hospice referral noted , Palliative note reviewed , no EN feeds indicated , suggest restart PO diet as medically indicated      Current Weight:    Pertinent Medications: MEDICATIONS  (STANDING):  brimonidine 0.1% Ophthalmic Solution 1 Drop(s) Right EYE two times a day  Cosopt Preservative Free Eye Drops 1 Drop(s) 1 Drop(s) Right EYE two times a day  midodrine 5 milliGRAM(s) Oral every 8 hours  Netarsudil 0.02%/Latanoprost 0.005% 1 Drop(s) 1 Drop(s) Both EYES at bedtime  pantoprazole  Injectable 40 milliGRAM(s) IV Push daily  piperacillin/tazobactam IVPB.. 3.375 Gram(s) IV Intermittent every 8 hours  polyethylene glycol 3350 17 Gram(s) Oral two times a day  senna 2 Tablet(s) Oral at bedtime    MEDICATIONS  (PRN):  acetaminophen     Tablet .. 650 milliGRAM(s) Oral every 6 hours PRN Temp greater or equal to 38C (100.4F), Mild Pain (1 - 3)      Pertinent Labs:  12-27 Na135 mmol/L Glu 133 mg/dL<H> K+ 4.2 mmol/L Cr  1.03 mg/dL BUN 25 mg/dL<H> 12-27 Phos 4.0 mg/dL 12-25 Alb 1.3 g/dL<L> 12-25 Chol 110 mg/dL LDL --    HDL 37 mg/dL<L> Trig 66 mg/dL        Skin:     Edema:    Last BM: on    Estimated Needs:   [X] No Change since Previous Assessment  [X] Recalculated:     Previous Nutrition Diagnosis:     Nutrition Diagnosis is [X] Ongoing    [X] Resolved -     New Nutrition Diagnosis: [X] Not Applicable  [X] Chewing Difficulties    [X] Swallowing Difficulties    [X] Predicted Suboptimal Energy Intake  [X] Obesity   [X] Inadequate Oral Intake   [X] Increased Nutrient Needs   [X] Excessive Energy Intake   [X] Altered GI Function   [X] Unintended Weight Loss   [X] Food & Nutrition Related Knowledge Deficit  [X] Limited Adherence to nutrition related recommendations   [X] Mild Moderate Severe Malnutrition      Interventions:   1. Recommend  2.     Monitoring & Evaluation: will monitor:  [X] Weights   [X] PO Intake   [X] Follow Up (Per Protocol)  [X] Tolerance to Diet Prescription       RD to follow as per Nutrition protocol  Mariela King RDN Nutrition Follow Up Note  Hospital Course (Per Electronic Medical Record):   Source: Medical Record [X] Patient [X] Family [X] Nursing Staff [X]     Diet: NPO     Patient note NPO , pending OR for wound wash-out , s/p wound debridement yesterday (12/27) , PO intake was noted poor & dx of severe protein calorie malnutrition noted , patient was consuming between 25-50% of meals as per nursing flow sheet , Hospice referral noted , Palliative note reviewed , no EN feeds indicated , suggest restart PO diet as medically indicated,       Current Weight: (12/27) 109.1/49.5kg                         (12/26) 108.9/49.4kg                         (12/25) 105.6/47.9kg    Pertinent Medications: MEDICATIONS  (STANDING):  brimonidine 0.1% Ophthalmic Solution 1 Drop(s) Right EYE two times a day  Cosopt Preservative Free Eye Drops 1 Drop(s) 1 Drop(s) Right EYE two times a day  midodrine 5 milliGRAM(s) Oral every 8 hours  Netarsudil 0.02%/Latanoprost 0.005% 1 Drop(s) 1 Drop(s) Both EYES at bedtime  pantoprazole  Injectable 40 milliGRAM(s) IV Push daily  piperacillin/tazobactam IVPB.. 3.375 Gram(s) IV Intermittent every 8 hours  polyethylene glycol 3350 17 Gram(s) Oral two times a day  senna 2 Tablet(s) Oral at bedtime    MEDICATIONS  (PRN):  acetaminophen     Tablet .. 650 milliGRAM(s) Oral every 6 hours PRN Temp greater or equal to 38C (100.4F), Mild Pain (1 - 3)      Pertinent Labs:  12-27 Na135 mmol/L Glu 133 mg/dL<H> K+ 4.2 mmol/L Cr  1.03 mg/dL BUN 25 mg/dL<H> 12-27 Phos 4.0 mg/dL 12-25 Alb 1.3 g/dL<L> 12-25 Chol 110 mg/dL LDL --    HDL 37 mg/dL<L> Trig 66 mg/dL        Skin: sacrum unstageable , stage II (L) hip, Stage I (R) hip - suggest MVI , Vit C & Zinc sulfate to aid with wound healing    Edema: none    Last BM: (12/26)     Estimated Needs:   [X] No Change since Previous Assessment    Previous Nutrition Diagnosis: Severe Protein Calorie Malnutrition & increased nutrient needs     Nutrition Diagnosis is [X] Ongoing       New Nutrition Diagnosis: [X] Not Applicable      Interventions:   1. advance diet as  medically indicated   2. monitor NPO status     Monitoring & Evaluation: will monitor:  [X] Weights   [X] Follow Up (Per Protocol)  [X] Tolerance to Diet Prescription       RD to follow as per Nutrition protocol  Mariela King RDN

## 2022-12-28 NOTE — BRIEF OPERATIVE NOTE - NSICDXBRIEFPREOP_GEN_ALL_CORE_FT
PRE-OP DIAGNOSIS:  Sacral decubitus ulcer, stage IV 26-Dec-2022 10:58:48  Leona Fraga  
PRE-OP DIAGNOSIS:  Sacral decubitus ulcer, stage IV 26-Dec-2022 10:58:48  Leona Fraga

## 2022-12-28 NOTE — PROGRESS NOTE ADULT - SUBJECTIVE AND OBJECTIVE BOX
CC: f/u for infected  decub and polymicrobial bacteremia     Patient reports nothing    REVIEW OF SYSTEMS: limited  All other review of systems negative (Comprehensive ROS)    Antimicrobials Day #  5  piperacillin/tazobactam IVPB.. 3.375 Gram(s) IV Intermittent every 8 hours        Other Medications Reviewed    Vital Signs Last 24 Hrs  T(C): 36.6 (28 Dec 2022 14:00), Max: 37.1 (27 Dec 2022 21:32)  T(F): 97.8 (28 Dec 2022 14:00), Max: 98.8 (27 Dec 2022 21:32)  HR: 68 (28 Dec 2022 14:00) (55 - 73)  BP: 121/57 (28 Dec 2022 14:00) (113/61 - 159/70)  BP(mean): --  RR: 15 (28 Dec 2022 14:00) (15 - 20)  SpO2: 100% (28 Dec 2022 14:00) (98% - 100%)    Parameters below as of 28 Dec 2022 14:00  Patient On (Oxygen Delivery Method): nasal cannula  O2 Flow (L/min): 2            PHYSICAL EXAM:  General: alert,in fetal position no acute distress  Eyes:  anicteric, no conjunctival injection, no discharge  Oropharynx: no lesions or injection 	  Neck: supple, without adenopathy  Lungs: clear to auscultation  Heart: regular rate and rhythm; no murmur, rubs or gallops  Abdomen: soft, nondistended, nontender, without mass or organomegaly  Skin: no lesions  Extremities: no clubbing, cyanosis, / Legs with  edema  Neurologic: alert, not moving much  back with necrotic decub--dressed     LAB RESULTS:                                     8.8    9.47  )-----------( 163      ( 28 Dec 2022 07:15 )             27.1   12    135  |  103  |  25<H>  ----------------------------<  133<H>  4.2   |  25  |  1.03    Ca    7.7<L>      27 Dec 2022 07:25  Phos  4.0     12  Mg     1.8     12        Urinalysis Basic - ( 24 Dec 2022 14:42 )    Color: Yellow / Appearance: Clear / S.015 / pH: x  Gluc: x / Ketone: Negative  / Bili: Small / Urobili: 4   Blood: x / Protein: 30 mg/dL / Nitrite: Negative   Leuk Esterase: Trace / RBC: 26-50 /HPF / WBC 3-5 /HPF   Sq Epi: x / Non Sq Epi: Neg.-Few / Bacteria: Negative /HPF        MICROBIOLOGY REVIEWED:  Culture - Blood (22 @ 11:34)   - Bacteroides fragilis: Detec   Gram Stain:   Growth in anaerobic bottle: Gram positive cocci in pairs and Gram   Negative Rods   Specimen Source: .Blood Blood-Peripheral   Culture - Tissue with Gram Stain (22 @ 11:47)   Gram Stain:   No polymorphonuclear leukocytes seen per low power field   Numerous Gram positive cocci in pairs seen per oil power field   Numerous Gram Variable Rods seen per oil power field   Specimen Source: Bone SACRAL #4   Culture - Blood (22 @ 11:34)   - Bacteroides fragilis: Detec   Gram Stain:   Growth in anaerobic bottle: Gram positive cocci in pairs and Gram   Negative Rods   Specimen Source: .Blood Blood-Peripheral   Organism: Blood Culture PCR   Culture Results:   Growth in anaerobic bottle: Gram positive cocci in pairs and Gram   Negative Rods   ***Blood Panel PCR results on this specimen are available   approximately 3 hours after the Gram stain result.***   Gram stain, PCR, and/or culture results may not always   correspond due to difference in methodologies.   RADIOLOGY REVIEWED:  < from: CT Chest w/ IV Cont (22 @ 13:58) >    IMPRESSION:  1. Bilateral pulmonary emboli. Thrombusis identified within the right   femoral vein as well as deep femoral vein consistent with DVT.  2. There is a marginally enhancing air/fluid collection, which may be   multiloculated measuring 4.5 x 2.0 cm within the left gluteal   musculature. Subcutaneous air is identified overlying the inferior sacrum   and medial right gluteal region.  3. Droplets of epidural air are identified at the level of the sacral   canal; the patient is status post laminectomy at L4-L5. Differential   considerations of the epidural air would include extension of the sacral   decubitus infection versus secondary to degenerative change.    < end of copied text >     CC: f/u for infected  decub and polymicrobial bacteremia     Patient reports nothing    REVIEW OF SYSTEMS: limited  All other review of systems negative (Comprehensive ROS)    Antimicrobials Day #  5  piperacillin/tazobactam IVPB.. 3.375 Gram(s) IV Intermittent every 8 hours        Other Medications Reviewed    Vital Signs Last 24 Hrs  T(C): 36.6 (28 Dec 2022 14:00), Max: 37.1 (27 Dec 2022 21:32)  T(F): 97.8 (28 Dec 2022 14:00), Max: 98.8 (27 Dec 2022 21:32)  HR: 68 (28 Dec 2022 14:00) (55 - 73)  BP: 121/57 (28 Dec 2022 14:00) (113/61 - 159/70)  BP(mean): --  RR: 15 (28 Dec 2022 14:00) (15 - 20)  SpO2: 100% (28 Dec 2022 14:00) (98% - 100%)    Parameters below as of 28 Dec 2022 14:00  Patient On (Oxygen Delivery Method): nasal cannula  O2 Flow (L/min): 2            PHYSICAL EXAM:  General: alert,in fetal position no acute distress  Eyes:  anicteric, no conjunctival injection, no discharge  Oropharynx: no lesions or injection 	  Neck: supple, without adenopathy  Lungs: clear to auscultation  Heart: regular rate and rhythm; no murmur, rubs or gallops  Abdomen: soft, nondistended, nontender, without mass or organomegaly  Skin: no lesions  Extremities: no clubbing, cyanosis, / Legs with  edema  Neurologic: alert, not moving much  back with wound vac in place    LAB RESULTS:                                     8.8    9.47  )-----------( 163      ( 28 Dec 2022 07:15 )             27.1       135  |  103  |  25<H>  ----------------------------<  133<H>  4.2   |  25  |  1.03    Ca    7.7<L>      27 Dec 2022 07:25  Phos  4.0       Mg     1.8             Urinalysis Basic - ( 24 Dec 2022 14:42 )    Color: Yellow / Appearance: Clear / S.015 / pH: x  Gluc: x / Ketone: Negative  / Bili: Small / Urobili: 4   Blood: x / Protein: 30 mg/dL / Nitrite: Negative   Leuk Esterase: Trace / RBC: 26-50 /HPF / WBC 3-5 /HPF   Sq Epi: x / Non Sq Epi: Neg.-Few / Bacteria: Negative /HPF        MICROBIOLOGY REVIEWED:  Culture - Blood (22 @ 11:34)   - Bacteroides fragilis: Detec   Gram Stain:   Growth in anaerobic bottle: Gram positive cocci in pairs and Gram   Negative Rods   Specimen Source: .Blood Blood-Peripheral   Culture - Tissue with Gram Stain (22 @ 11:47)   Gram Stain:   No polymorphonuclear leukocytes seen per low power field   Numerous Gram positive cocci in pairs seen per oil power field   Numerous Gram Variable Rods seen per oil power field   Specimen Source: Bone SACRAL #4   Culture - Blood (22 @ 11:34)   - Bacteroides fragilis: Detec   Gram Stain:   Growth in anaerobic bottle: Gram positive cocci in pairs and Gram   Negative Rods   Specimen Source: .Blood Blood-Peripheral   Organism: Blood Culture PCR   Culture Results:   Growth in anaerobic bottle: Gram positive cocci in pairs and Gram   Negative Rods   ***Blood Panel PCR results on this specimen are available   approximately 3 hours after the Gram stain result.***   Gram stain, PCR, and/or culture results may not always   correspond due to difference in methodologies.   RADIOLOGY REVIEWED:  < from: CT Chest w/ IV Cont (22 @ 13:58) >    IMPRESSION:  1. Bilateral pulmonary emboli. Thrombusis identified within the right   femoral vein as well as deep femoral vein consistent with DVT.  2. There is a marginally enhancing air/fluid collection, which may be   multiloculated measuring 4.5 x 2.0 cm within the left gluteal   musculature. Subcutaneous air is identified overlying the inferior sacrum   and medial right gluteal region.  3. Droplets of epidural air are identified at the level of the sacral   canal; the patient is status post laminectomy at L4-L5. Differential   considerations of the epidural air would include extension of the sacral   decubitus infection versus secondary to degenerative change.    < end of copied text >

## 2022-12-28 NOTE — BRIEF OPERATIVE NOTE - NSICDXBRIEFPOSTOP_GEN_ALL_CORE_FT
POST-OP DIAGNOSIS:  Sacral decubitus ulcer, stage IV 26-Dec-2022 10:59:12  Leona Fraga  
POST-OP DIAGNOSIS:  Sacral decubitus ulcer, stage IV 26-Dec-2022 10:59:12  Leona Fraga

## 2022-12-28 NOTE — PROGRESS NOTE ADULT - SUBJECTIVE AND OBJECTIVE BOX
This is an 87-year-old female  with h/o Dementia, colon cancer s/p resection brought in by EMS to the ED by her daughter for a decubitus ulcer and increasing weakness.  Patient was also short of breath per EMS.  Per patient's daughter she had a small ulcer that began 6 months ago, which they were doing local wound care for, however she reports she has not seen the ulcer in 2 weeks, when she looked at it today she saw that it was large and very deep.  As per daughter, patient has been declining in health for the past year, last seen by pmd 18 months ago. Patient was ambulating with poor balance, became incontinent over 6 months ago. She developed and a sacral ulcer while wearing a diaper and being less mobile. She eventually became more sedentary, bedbound about  2 weeks ago. Sacral ulcer soon developed after that and was noticed by daughter as the size of a coin. Daughter noticed today the ulcer became very large. There was no reported fever, chills no chest pain no nausea vomiting.    In the ED, patient was evaluated for respiratory distress and lower back tenderness. She was initiated on sepsis protocol, IVF resuscitation, started on IV antibiotics, blood cultures and lactic acid levels were obtained. A CT angiogram of chest and CT of abdomen/ pelvis were performed.  where showed Gluteal Abcess, b/l PE, Fem DVT and sacral decub    Critical care and general surgery were both consulted  Surgical consult called and seen patient and not acute surgical candidate      24 hr events: No active issues overnight, no rachelle bleeding from site. Heparin gtt was held at 0330 in anticipation for RTOR today.       ## ROS: DOREEN 2/2 pt minimally verbal      ## Labs:  CBC:                        8.8    9.47  )-----------( 163      ( 28 Dec 2022 07:15 )             27.1     Chem:      135  |  103  |  25<H>  ----------------------------<  133<H>  4.2   |  25  |  1.03    Ca    7.7<L>      27 Dec 2022 07:25  Phos  4.0       Mg     1.8           Coags:  PT/INR - ( 27 Dec 2022 12:05 )   PT: 13.8 sec;   INR: 1.19 ratio    PTT - ( 28 Dec 2022 04:00 )  PTT:64.0 sec        ## Medications:  piperacillin/tazobactam IVPB.. 3.375 Gram(s) IV Intermittent every 8 hours  midodrine 5 milliGRAM(s) Oral every 8 hours  pantoprazole  Injectable 40 milliGRAM(s) IV Push daily  polyethylene glycol 3350 17 Gram(s) Oral two times a day  senna 2 Tablet(s) Oral at bedtime  acetaminophen     Tablet .. 650 milliGRAM(s) Oral every 6 hours PRN      ## Vitals:  T(C): 36.6 (22 @ 06:16), Max: 37.1 (22 @ 21:32)  HR: 73 (22 @ 06:16) (67 - 73)  BP: 133/98 (22 @ 06:16) (113/61 - 133/98)  BP(mean): --  RR: 18 (22 @ 06:16) (16 - 20)  SpO2: 98% (22 @ 06:16) (98% - 100%)  Wt(kg): --  Vent:   AB-27 @ 07:01  -   @ 07:00  --------------------------------------------------------  IN: 627 mL / OUT: 300 mL / NET: 327 mL        ## P/E:  Gen: lying comfortably in bed in no apparent distress  HEENT: PERRL, EOMI  Resp: CTA, mildly diminished bs b/l, slight increased wob noted, no rales or crackles or wheezing noted  CVS: S1S2 no m/r/g  Abd: soft NT/ND +BS  Ext: 2+ edema b/l  Neuro: awake oriented x2, minimally conversive      CODE STATUS: DNR/I, MOLST in place

## 2022-12-28 NOTE — PROGRESS NOTE ADULT - ASSESSMENT
88 yo F hx colon CA, dementia, bedbound, necrotizing decubitus ulcer with extension to gluteal area and some epidural air at level of sacrum as well with growth of strep species and bacteroides from the blood.   She also has pe and dvt.   Now s/p excisional debridement of sacral decub POD#2  Recommendations:  continue zosyn for now  f/u final wound cult  if any residual osteo, oral Augmentin maybe an option, will review with surgery  local wound care remains most important  Full note to follow 86 yo F hx colon CA, dementia, bedbound, necrotizing decubitus ulcer with extension to gluteal area and some epidural air at level of sacrum as well with growth of strep species and bacteroides from the blood.   She also has pe and dvt.   Now s/p excisional debridement of sacral decub POD#2  Recommendations:  continue zosyn for now  f/u final wound cult  if any residual osteo, oral Augmentin maybe an option, will review with surgery  local wound care remains most important

## 2022-12-28 NOTE — PROGRESS NOTE ADULT - ASSESSMENT
87-year-old female brought in by EMS to the ED by her daughter for a decubitus ulcer.  Patient was also short of breath per EMS.  Per patient's daughter she had a small ulcer that began 6 months ago, which they were doing local wound care for, however she reports she has not seen the ulcer in 2 weeks, when she looked at it today she saw that it was large and very deep.  As per daughter, patient has been declining in health for the past year, last seen by pmd 18 months ago. Patient was ambulating with poor balance, became incontinent over 6 months ago. She developed and a sacral ulcer while wearing a diaper and being less mobile. She eventually became more sedentary, bedbound about  2 weeks ago. Sacral ulcer soon developed after that and was noticed by daughter as the size of a coin. Daughter noticed today the ulcer became very large. There was no reported fever, chills no chest pain no nausea vomiting.    1. Septic shock due to Large sacral decubiti with lactic acidosis  2. Right femoral vein DVT  3. Bilateral pulmonary emboli   4. Elevated troponin levels suspect from PE  5. Colon cancer  6. Severe Protein malnutrition with low hypoalbuminemia  7. S/p sacral debridement     - Continue current management; vitals per unit monitoring policy  - Heparin infusion held at 0330 in anticipation for RTOR this AM; fup surgeon recc's post op regarding restarting   - RTOR, plan per surgeon  - Continue pain management as per surgery  - Continue supplemental oxygen via nasal cannula prn, wean as tolerated  - Continue antibiotics per ID, IVF hydration with lactated ringer's solution; fup cultures  - Continue Midodrine for borderline bp  - trend h/h; s/p 1 unit prbc yesterday  - Monitor i/o's, bun / creatinine; avoid nephrotoxic agents  - currently NPO for OR; assess dietary status post op  - GI and DVT prophylaxis  - Patient is DNR/DNI with MOLST in chart; no pressors per prior documentation    Pt daughter Bruno updated this AM.

## 2022-12-28 NOTE — BRIEF OPERATIVE NOTE - NSICDXBRIEFPROCEDURE_GEN_ALL_CORE_FT
PROCEDURES:  Irrigation and debridement, ulcer, sacral region 26-Dec-2022 10:58:27  Leona Fraga  
PROCEDURES:  Application of acellular dermal matrix 28-Dec-2022 11:11:01  Leona Fraga  Irrigation and debridement, ulcer, sacral region 28-Dec-2022 11:11:36 with application of acellular dermal matrix and wound vac 12/28 Leona Fraga

## 2022-12-29 LAB
-  AMIKACIN: SIGNIFICANT CHANGE UP
-  AMOXICILLIN/CLAVULANIC ACID: SIGNIFICANT CHANGE UP
-  AMPICILLIN/SULBACTAM: SIGNIFICANT CHANGE UP
-  AMPICILLIN: SIGNIFICANT CHANGE UP
-  AZTREONAM: SIGNIFICANT CHANGE UP
-  CEFAZOLIN: SIGNIFICANT CHANGE UP
-  CEFEPIME: SIGNIFICANT CHANGE UP
-  CEFOXITIN: SIGNIFICANT CHANGE UP
-  CEFTRIAXONE: SIGNIFICANT CHANGE UP
-  CIPROFLOXACIN: SIGNIFICANT CHANGE UP
-  ERTAPENEM: SIGNIFICANT CHANGE UP
-  GENTAMICIN: SIGNIFICANT CHANGE UP
-  IMIPENEM: SIGNIFICANT CHANGE UP
-  LEVOFLOXACIN: SIGNIFICANT CHANGE UP
-  MEROPENEM: SIGNIFICANT CHANGE UP
-  PIPERACILLIN/TAZOBACTAM: SIGNIFICANT CHANGE UP
-  TOBRAMYCIN: SIGNIFICANT CHANGE UP
-  TRIMETHOPRIM/SULFAMETHOXAZOLE: SIGNIFICANT CHANGE UP
ANION GAP SERPL CALC-SCNC: 7 MMOL/L — SIGNIFICANT CHANGE UP (ref 5–17)
APTT BLD: 40.9 SEC — HIGH (ref 27.5–35.5)
APTT BLD: >200 SEC — CRITICAL HIGH (ref 27.5–35.5)
BUN SERPL-MCNC: 29 MG/DL — HIGH (ref 7–23)
CALCIUM SERPL-MCNC: 7.7 MG/DL — LOW (ref 8.4–10.5)
CHLORIDE SERPL-SCNC: 104 MMOL/L — SIGNIFICANT CHANGE UP (ref 96–108)
CO2 SERPL-SCNC: 26 MMOL/L — SIGNIFICANT CHANGE UP (ref 22–31)
CREAT SERPL-MCNC: 1.36 MG/DL — HIGH (ref 0.5–1.3)
EGFR: 38 ML/MIN/1.73M2 — LOW
GLUCOSE SERPL-MCNC: 131 MG/DL — HIGH (ref 70–99)
HCT VFR BLD CALC: 26.7 % — LOW (ref 34.5–45)
HGB BLD-MCNC: 8.7 G/DL — LOW (ref 11.5–15.5)
MAGNESIUM SERPL-MCNC: 1.9 MG/DL — SIGNIFICANT CHANGE UP (ref 1.6–2.6)
MCHC RBC-ENTMCNC: 29.5 PG — SIGNIFICANT CHANGE UP (ref 27–34)
MCHC RBC-ENTMCNC: 32.6 GM/DL — SIGNIFICANT CHANGE UP (ref 32–36)
MCV RBC AUTO: 90.5 FL — SIGNIFICANT CHANGE UP (ref 80–100)
METHOD TYPE: SIGNIFICANT CHANGE UP
NRBC # BLD: 0 /100 WBCS — SIGNIFICANT CHANGE UP (ref 0–0)
PHOSPHATE SERPL-MCNC: 3.7 MG/DL — SIGNIFICANT CHANGE UP (ref 2.5–4.5)
PLATELET # BLD AUTO: 175 K/UL — SIGNIFICANT CHANGE UP (ref 150–400)
POTASSIUM SERPL-MCNC: 3.8 MMOL/L — SIGNIFICANT CHANGE UP (ref 3.5–5.3)
POTASSIUM SERPL-SCNC: 3.8 MMOL/L — SIGNIFICANT CHANGE UP (ref 3.5–5.3)
RBC # BLD: 2.95 M/UL — LOW (ref 3.8–5.2)
RBC # FLD: 14.6 % — HIGH (ref 10.3–14.5)
SODIUM SERPL-SCNC: 137 MMOL/L — SIGNIFICANT CHANGE UP (ref 135–145)
SURGICAL PATHOLOGY STUDY: SIGNIFICANT CHANGE UP
WBC # BLD: 9.13 K/UL — SIGNIFICANT CHANGE UP (ref 3.8–10.5)
WBC # FLD AUTO: 9.13 K/UL — SIGNIFICANT CHANGE UP (ref 3.8–10.5)

## 2022-12-29 PROCEDURE — 99232 SBSQ HOSP IP/OBS MODERATE 35: CPT

## 2022-12-29 PROCEDURE — 99233 SBSQ HOSP IP/OBS HIGH 50: CPT | Mod: FS

## 2022-12-29 RX ORDER — PANTOPRAZOLE SODIUM 20 MG/1
40 TABLET, DELAYED RELEASE ORAL
Refills: 0 | Status: DISCONTINUED | OUTPATIENT
Start: 2022-12-30 | End: 2023-01-05

## 2022-12-29 RX ORDER — APIXABAN 2.5 MG/1
5 TABLET, FILM COATED ORAL
Refills: 0 | Status: DISCONTINUED | OUTPATIENT
Start: 2022-12-30 | End: 2023-01-05

## 2022-12-29 RX ADMIN — PANTOPRAZOLE SODIUM 40 MILLIGRAM(S): 20 TABLET, DELAYED RELEASE ORAL at 11:48

## 2022-12-29 RX ADMIN — MIDODRINE HYDROCHLORIDE 5 MILLIGRAM(S): 2.5 TABLET ORAL at 05:49

## 2022-12-29 RX ADMIN — Medication 81 MILLIGRAM(S): at 11:48

## 2022-12-29 RX ADMIN — MIDODRINE HYDROCHLORIDE 5 MILLIGRAM(S): 2.5 TABLET ORAL at 14:32

## 2022-12-29 RX ADMIN — DORZOLAMIDE HYDROCHLORIDE TIMOLOL MALEATE 1 DROP(S): 20; 5 SOLUTION/ DROPS OPHTHALMIC at 05:48

## 2022-12-29 RX ADMIN — HEPARIN SODIUM 900 UNIT(S)/HR: 5000 INJECTION INTRAVENOUS; SUBCUTANEOUS at 07:29

## 2022-12-29 RX ADMIN — HEPARIN SODIUM 0 UNIT(S)/HR: 5000 INJECTION INTRAVENOUS; SUBCUTANEOUS at 09:50

## 2022-12-29 RX ADMIN — PIPERACILLIN AND TAZOBACTAM 25 GRAM(S): 4; .5 INJECTION, POWDER, LYOPHILIZED, FOR SOLUTION INTRAVENOUS at 14:31

## 2022-12-29 RX ADMIN — HEPARIN SODIUM 1500 UNIT(S): 5000 INJECTION INTRAVENOUS; SUBCUTANEOUS at 00:09

## 2022-12-29 RX ADMIN — BRIMONIDINE TARTRATE 1 DROP(S): 2 SOLUTION/ DROPS OPHTHALMIC at 16:57

## 2022-12-29 RX ADMIN — DORZOLAMIDE HYDROCHLORIDE TIMOLOL MALEATE 1 DROP(S): 20; 5 SOLUTION/ DROPS OPHTHALMIC at 16:56

## 2022-12-29 RX ADMIN — PIPERACILLIN AND TAZOBACTAM 25 GRAM(S): 4; .5 INJECTION, POWDER, LYOPHILIZED, FOR SOLUTION INTRAVENOUS at 05:47

## 2022-12-29 RX ADMIN — BRIMONIDINE TARTRATE 1 DROP(S): 2 SOLUTION/ DROPS OPHTHALMIC at 05:49

## 2022-12-29 RX ADMIN — POLYETHYLENE GLYCOL 3350 17 GRAM(S): 17 POWDER, FOR SOLUTION ORAL at 05:48

## 2022-12-29 NOTE — PROGRESS NOTE ADULT - ASSESSMENT
88 yo F hx colon CA, dementia, bedbound, necrotizing decubitus ulcer with extension to gluteal area and some epidural air at level of sacrum as well with growth of strep species and bacteroides from the blood. The strep has been identified as a strep anginosis.  She also has PE  and DVT.  Now s/p excisional debridement of sacral decub POD#3  she is s/p application of micromatrix on 12/28  Bone culture with mixed anaerobes as well as a sensitive E coli  TTE without vegetations  Recommendations:  1 continue zosyn for now, day 6, perhaps a ao day course   2. Improving nutritional status, off loading, and local wound care will be critical  3.Will consider extending antibiotic course after zosyn with oral augmentin, although endpoint will be arbitrary.  4. Control, nor eradication of pelvic infection will be goal.  5.Will advise repeat blood cultures if she spikes a fever.

## 2022-12-29 NOTE — PROVIDER CONTACT NOTE (CRITICAL VALUE NOTIFICATION) - SITUATION
ptt over 200.
Pt admit for PE
Critical lab notification for tissue specimens.
Received call from lab that there is G=growth in aerobic and anaerobic bottles: Gram Positive Cocci in Pairs and  Chains  Growth in anaerobic bottle: Gram Negative Rods  upon repeat smear after prolonged incubation  Previously reported as:  Growth in aerobic and anaerobic bottles: Gram Positive Cocci in Pairs and  Chains
critical aPTT of 127.0. PA made aware.

## 2022-12-29 NOTE — PROVIDER CONTACT NOTE (CRITICAL VALUE NOTIFICATION) - NAME OF MD/NP/PA/DO NOTIFIED:
CROW mendez
MELLISSA Warner
urovish
Layla Tamayo
ICU/SURG PA
ESTEBAN MALLOY
ICU MELLISSA KNOX
Maria Fernanda Diaz

## 2022-12-29 NOTE — PROGRESS NOTE ADULT - SUBJECTIVE AND OBJECTIVE BOX
Progress:  pt resting quietly on rounds this AM , s/p OR for sacral wound cleaning yesterday     Review of Systems:  Unable to obtain due to poor mentation    MEDICATIONS  (STANDING):  aspirin  chewable 81 milliGRAM(s) Oral daily  brimonidine 0.2% Ophthalmic Solution 1 Drop(s) Right EYE every 12 hours  dorzolamide 2%/timolol 0.5% Ophthalmic Solution 1 Drop(s) Right EYE every 12 hours  midodrine 5 milliGRAM(s) Oral every 8 hours  pantoprazole  Injectable 40 milliGRAM(s) IV Push daily  piperacillin/tazobactam IVPB.. 3.375 Gram(s) IV Intermittent every 8 hours  polyethylene glycol 3350 17 Gram(s) Oral two times a day  senna 2 Tablet(s) Oral at bedtime    MEDICATIONS  (PRN):  acetaminophen     Tablet .. 650 milliGRAM(s) Oral every 6 hours PRN Temp greater or equal to 38C (100.4F), Mild Pain (1 - 3)      PHYSICAL EXAM:  Vital Signs Last 24 Hrs  T(C): 36.8 (29 Dec 2022 05:46), Max: 36.8 (29 Dec 2022 05:46)  T(F): 98.3 (29 Dec 2022 05:46), Max: 98.3 (29 Dec 2022 05:46)  HR: 83 (29 Dec 2022 05:46) (62 - 83)  BP: 126/72 (29 Dec 2022 05:46) (113/66 - 159/70)  BP(mean): --  RR: 18 (29 Dec 2022 05:46) (15 - 18)  SpO2: 100% (29 Dec 2022 05:46) (99% - 100%)    Parameters below as of 29 Dec 2022 05:46  Patient On (Oxygen Delivery Method): room air      General: alert w/ stim., confused , mumbles words     HEENT: n/c, a/t    Lungs: cl dim to bases    CV: normal  rate  GI: abd soft     : normal/incontinent      Musculoskeletal: normal, w/ weakness   Skin: pale, w/d, st 4 sacrum , wound vac placed     Neuro: + deficits   Oral intake ability:  oral feeding-yes  Diet: soft, as henry w/ assist      LABS:                          8.7    9.13  )-----------( 175      ( 29 Dec 2022 07:34 )             26.7     12-29    137  |  104  |  29<H>  ----------------------------<  131<H>  3.8   |  26  |  1.36<H>    Ca    7.7<L>      29 Dec 2022 07:34  Phos  3.7     12-29  Mg     1.9     12-29          RADIOLOGY & ADDITIONAL STUDIES: < from: CT Chest w/ IV Cont (12.24.22 @ 13:58) >  ACC: [id number] EXAM:  CT ABDOMEN AND PELVIS IC                        ACC: [id number] EXAM:  CT CHEST IC                          PROCEDURE DATE:  12/24/2022      < from: CT Chest w/ IV Cont (12.24.22 @ 13:58) >  IMPRESSION:  1. Bilateral pulmonary emboli. Thrombusis identified within the right   femoral vein as well as deep femoral vein consistent with DVT.  2. There is a marginally enhancing air/fluid collection, which may be   multiloculated measuring 4.5 x 2.0 cm within the left gluteal   musculature. Subcutaneous air is identified overlying the inferior sacrum   and medial right gluteal region.  3. Droplets of epidural air are identified at the level of the sacral   canal; the patient is status post laminectomy at L4-L5. Differential   considerations of the epidural air would include extension of the sacral   decubitus infection versus secondary to degenerative change.          ADVANCE DIRECTIVES:  Molst Dnr/Dni no feeding tube   Advanced Care Planning discussion total time spent:

## 2022-12-29 NOTE — PROVIDER CONTACT NOTE (CRITICAL VALUE NOTIFICATION) - PERSON GIVING RESULT:
Dequan Pugh - Lab
[facility]
Devi Morin
[facility]
Kelechi from the lab
geovany Rodríguez
xavier/ lab

## 2022-12-29 NOTE — PHARMACOTHERAPY INTERVENTION NOTE - COMMENTS
Heparin drip held today due to supratherapeutic aPTT and risk of bleeding. Per CCU NP, will start Eliquis tomorrow. Recommended to start 5mg BID for DVT/PE treatment since crcl <30ml/min.

## 2022-12-29 NOTE — PROGRESS NOTE ADULT - ASSESSMENT
A/P  87-year-old female brought in by EMS to the ED by her daughter for a decubitus ulcer.  Patient was also short of breath per EMS.  Per patient's daughter she had a small ulcer that began 6 months ago, which they were doing local wound care for, however she reports she has not seen the ulcer in 2 weeks, when she looked at it recently,  she saw that it was large and very deep.  As per daughter, patient has been declining in health for the past year, last seen by pmd 18 months ago. Patient was ambulating with poor balance, became incontinent over 6 months ago. Admitted for sepsis likely from wound. S/p wound debridement and washout  12/28.       hx colon CA, dementia, bedbound, p/w, necrotizing decubitus ulcer with extension to gluteal area and some epidural air at level of sacrum as well with growth of strep species and bacteroides from the blood.      Assessment/Plans:      Septic shock due to Large sacral decubiti with lactic acidosis- ID following on IV antbx    Right femoral vein DVT   Bilateral pulmonary emboli    Elevated troponin levels suspect from PE   Hx Colon cancer   Severe Protein malnutrition with low hypoalbuminemia   S/p sacral debridement in OR , and washout 12/28    now w/ wound vac in place     - Continue current management; vitals per unit monitoring policy  - f/u plan per surgery   - Continue pain management as per surgery  - Continue supplemental oxygen via nasal cannula prn, wean as tolerated  - Continue antibiotics per ID, IVF hydration   - trend h/h; s/p 1 unit prbc yesterday  -  wound vac per surgery       Palliative : as a f/u, case d/w ccu team this AM, and chart reviewed. Pt in bed, now w/ wound vac in place , s/p surgery for wound debridement and washout.   I had recommended home hospice services to [facility], however wound vac then got placed, so hospice is on hold until wound improves,  and wound vac removed.    I called and spoke w/ daughter Bruno  today , she is aware hospice cannot take wound vac.  She  was at work and needed to end call  and said she would call me back.         CM/SW are  aware that hospice unable to take pt at this time.  Can consider home care services  that can handle wound vacs, or [facility] stay  until wound vac removed.   Hospice can be re- consulted as an outpt once wound vac removed.  To be determined by daughter's preference  , CM to follow up for proper d/c  plan.

## 2022-12-29 NOTE — PROGRESS NOTE ADULT - SUBJECTIVE AND OBJECTIVE BOX
This is an 87-year-old female  with h/o Dementia, colon cancer s/p resection brought in by EMS to the ED by her daughter for a decubitus ulcer and increasing weakness.  Patient was also short of breath per EMS.  Per patient's daughter she had a small ulcer that began 6 months ago, which they were doing local wound care for, however she reports she has not seen the ulcer in 2 weeks, when she looked at it today she saw that it was large and very deep.  As per daughter, patient has been declining in health for the past year, last seen by pmd 18 months ago. Patient was ambulating with poor balance, became incontinent over 6 months ago. She developed and a sacral ulcer while wearing a diaper and being less mobile. She eventually became more sedentary, bedbound about  2 weeks ago. Sacral ulcer soon developed after that and was noticed by daughter as the size of a coin. Daughter noticed today the ulcer became very large. There was no reported fever, chills no chest pain no nausea vomiting.    In the ED, patient was evaluated for respiratory distress and lower back tenderness. She was initiated on sepsis protocol, IVF resuscitation, started on IV antibiotics, blood cultures and lactic acid levels were obtained. A CT angiogram of chest and CT of abdomen/ pelvis were performed.  where showed Gluteal Abcess, b/l PE, Fem DVT and sacral decub    Critical care and general surgery were both consulted  Surgical consult called and seen patient and not acute surgical candidate      24 hr events: No issues overnight pt resting in bed comfortably. This AM ptt >200, heparin gtt held. No s/s of bleeding noted.       ## ROS:  [ x ] unable to obtain      ## Labs:  CBC:                        8.7    9.13  )-----------( 175      ( 29 Dec 2022 07:34 )             26.7     Chem:  12-    137  |  104  |  29<H>  ----------------------------<  131<H>  3.8   |  26  |  1.36<H>    Ca    7.7<L>      29 Dec 2022 07:34  Phos  3.7     12-29  Mg     1.9     12-29      Coags:  PTT - ( 29 Dec 2022 12:15 )  PTT:40.9 sec        ## Imaging:    ## Medications:  piperacillin/tazobactam IVPB.. 3.375 Gram(s) IV Intermittent every 8 hours    midodrine 5 milliGRAM(s) Oral every 8 hours        aspirin  chewable 81 milliGRAM(s) Oral daily    pantoprazole  Injectable 40 milliGRAM(s) IV Push daily  polyethylene glycol 3350 17 Gram(s) Oral two times a day  senna 2 Tablet(s) Oral at bedtime    acetaminophen     Tablet .. 650 milliGRAM(s) Oral every 6 hours PRN      ## Vitals:  T(C): 36.8 (22 @ 05:46), Max: 36.8 (22 @ 05:46)  HR: 83 (22 @ 05:46) (68 - 83)  BP: 126/72 (22 @ 05:46) (117/59 - 126/72)  BP(mean): --  RR: 18 (22 @ 05:46) (15 - 18)  SpO2: 100% (22 @ 05:46) (100% - 100%)  Wt(kg): --  Vent:   AB-28 @ 07:01  -   @ 07:00  --------------------------------------------------------  IN: 344 mL / OUT: 600 mL / NET: -256 mL     @ 07:01  -   @ 12:54  --------------------------------------------------------  IN: 120 mL / OUT: 0 mL / NET: 120 mL          ## P/E:  Gen: lying comfortably in bed in no apparent distress  HEENT: PERRL, EOMI  Resp: CTA B/L no c/r/w  CVS: S1S2 no m/r/g  Abd: soft NT/ND +BS  Ext: no c/c/e  Neuro: A&Ox3    CENTRAL LINE: [ ] YES [ ] NO  LOCATION:   DATE INSERTED:  REMOVE: [ ] YES [ ] NO      ROLON: [ ] YES [ ] NO    DATE INSERTED:  REMOVE:  [ ] YES [ ] NO      A-LINE:  [ ] YES [ ] NO  LOCATION:   DATE INSERTED:  REMOVE:  [ ] YES [ ] NO  EXPLAIN:    GLOBAL ISSUE/BEST PRACTICE:  Analgesia:  Sedation:  HOB elevation: yes  Stress ulcer prophylaxis:  VTE prophylaxis:  Oral Care:  Glycemic control:  Nutrition:    CODE STATUS: [ ] full code  [ ] DNR  [ ] DNI  [ ] MOLST  Goals of care discussion: [ ] yes  This is an 87-year-old female  with h/o Dementia, colon cancer s/p resection brought in by EMS to the ED by her daughter for a decubitus ulcer and increasing weakness.  Patient was also short of breath per EMS.  Per patient's daughter she had a small ulcer that began 6 months ago, which they were doing local wound care for, however she reports she has not seen the ulcer in 2 weeks, when she looked at it today she saw that it was large and very deep.  As per daughter, patient has been declining in health for the past year, last seen by pmd 18 months ago. Patient was ambulating with poor balance, became incontinent over 6 months ago. She developed and a sacral ulcer while wearing a diaper and being less mobile. She eventually became more sedentary, bedbound about  2 weeks ago. Sacral ulcer soon developed after that and was noticed by daughter as the size of a coin. Daughter noticed today the ulcer became very large. There was no reported fever, chills no chest pain no nausea vomiting.    In the ED, patient was evaluated for respiratory distress and lower back tenderness. She was initiated on sepsis protocol, IVF resuscitation, started on IV antibiotics, blood cultures and lactic acid levels were obtained. A CT angiogram of chest and CT of abdomen/ pelvis were performed.  where showed Gluteal Abcess, b/l PE, Fem DVT and sacral decub    Critical care and general surgery were both consulted  Surgical consult called and seen patient and not acute surgical candidate      24 hr events: No issues overnight pt resting in bed comfortably. This AM ptt >200, heparin gtt held. No s/s of bleeding noted.       ## ROS:  [ x ] unable to obtain      ## Labs:  CBC:                        8.7    9.13  )-----------( 175      ( 29 Dec 2022 07:34 )             26.7     Chem:  12-29    137  |  104  |  29<H>  ----------------------------<  131<H>  3.8   |  26  |  1.36<H>    Ca    7.7<L>      29 Dec 2022 07:34  Phos  3.7     12-29  Mg     1.9     12-29      Coags:  PTT - ( 29 Dec 2022 12:15 )  PTT:40.9 sec        ## Medications:  piperacillin/tazobactam IVPB.. 3.375 Gram(s) IV Intermittent every 8 hours  midodrine 5 milliGRAM(s) Oral every 8 hours  aspirin  chewable 81 milliGRAM(s) Oral daily  pantoprazole  Injectable 40 milliGRAM(s) IV Push daily  polyethylene glycol 3350 17 Gram(s) Oral two times a day  senna 2 Tablet(s) Oral at bedtime  acetaminophen     Tablet .. 650 milliGRAM(s) Oral every 6 hours PRN      ## Vitals:  T(C): 36.8 (12-29-22 @ 05:46), Max: 36.8 (12-29-22 @ 05:46)  HR: 83 (12-29-22 @ 05:46) (68 - 83)  BP: 126/72 (12-29-22 @ 05:46) (117/59 - 126/72)  RR: 18 (12-29-22 @ 05:46) (15 - 18)  SpO2: 100% (12-29-22 @ 05:46) (100% - 100%)        12-28 @ 07:01  -  12-29 @ 07:00  --------------------------------------------------------  IN: 344 mL / OUT: 600 mL / NET: -256 mL    12-29 @ 07:01  -  12-29 @ 12:54  --------------------------------------------------------  IN: 120 mL / OUT: 0 mL / NET: 120 mL        ## P/E:  Gen: lying comfortably in bed in no apparent distress  HEENT: PERRL, EOMI  Resp: CTA, mildly diminished bs b/l, slight increased wob noted, no rales or crackles or wheezing noted  CVS: S1S2 no m/r/g  Abd: soft NT/ND +BS  Ext: 2+ edema b/l  Neuro: awake oriented x2, minimally conversive      CODE STATUS: DNR/I, MOLST in place

## 2022-12-29 NOTE — PROGRESS NOTE ADULT - ASSESSMENT
87-year-old female brought in by EMS to the ED by her daughter for a decubitus ulcer.  Patient was also short of breath per EMS.  Per patient's daughter she had a small ulcer that began 6 months ago, which they were doing local wound care for, however she reports she has not seen the ulcer in 2 weeks, when she looked at it today she saw that it was large and very deep.  As per daughter, patient has been declining in health for the past year, last seen by pmd 18 months ago. Patient was ambulating with poor balance, became incontinent over 6 months ago. She developed and a sacral ulcer while wearing a diaper and being less mobile. She eventually became more sedentary, bedbound about  2 weeks ago. Sacral ulcer soon developed after that and was noticed by daughter as the size of a coin. Daughter noticed today the ulcer became very large. There was no reported fever, chills no chest pain no nausea vomiting.    1. Septic shock due to Large sacral decubiti with lactic acidosis  2. Right femoral vein DVT  3. Bilateral pulmonary emboli   4. Elevated troponin levels suspect from PE  5. Colon cancer  6. Severe Protein malnutrition with low hypoalbuminemia  7. S/p sacral debridement     - Continue current management; vitals per unit monitoring policy  - Heparin gtt with supratherapeutic ptt this AM, gtt stopped with plan for eliquis to begin in AM, OK with surgery and doses appropriately with pharmacist   - wound vac care per surgery  - Continue pain management as per surgery  - Continue supplemental oxygen via nasal cannula prn, wean as tolerated  - Continue antibiotics per ID, IVF hydration with lactated ringer's solution; fup cultures  - Continue Midodrine for borderline bp  - h/h stable  - Monitor i/o's, bun / creatinine; avoid nephrotoxic agents  - diet and nutritional supplements as tolerated  - GI and DVT prophylaxis  - Patient is DNR/DNI with MOLST in chart; no pressors per prior documentation    Pt family updated this AM.

## 2022-12-29 NOTE — PROGRESS NOTE ADULT - SUBJECTIVE AND OBJECTIVE BOX
CC: f/u for polymicrobial bacteremia and infected sacral decubitus    Patient reports: she is non verbal in bed, s/p debridement  and application of micromatrix  12/28    REVIEW OF SYSTEMS:  All other review of systems negative (Comprehensive ROS): limited by patients condition    Antimicrobials Day #  :day 6  piperacillin/tazobactam IVPB.. 3.375 Gram(s) IV Intermittent every 8 hours    Other Medications Reviewed  MEDICATIONS  (STANDING):  aspirin  chewable 81 milliGRAM(s) Oral daily  brimonidine 0.2% Ophthalmic Solution 1 Drop(s) Right EYE every 12 hours  dorzolamide 2%/timolol 0.5% Ophthalmic Solution 1 Drop(s) Right EYE every 12 hours  heparin  Infusion.  Unit(s)/Hr (8 mL/Hr) IV Continuous <Continuous>  midodrine 5 milliGRAM(s) Oral every 8 hours  pantoprazole  Injectable 40 milliGRAM(s) IV Push daily  piperacillin/tazobactam IVPB.. 3.375 Gram(s) IV Intermittent every 8 hours  polyethylene glycol 3350 17 Gram(s) Oral two times a day  senna 2 Tablet(s) Oral at bedtime    T(F): 98.3 (12-29-22 @ 05:46), Max: 98.3 (12-29-22 @ 05:46)  HR: 83 (12-29-22 @ 05:46)  BP: 126/72 (12-29-22 @ 05:46)  RR: 18 (12-29-22 @ 05:46)  SpO2: 100% (12-29-22 @ 05:46)  Wt(kg): --    PHYSICAL EXAM:  General: sleepy, no acute distress  Eyes:  anicteric, no conjunctival injection, no discharge  Oropharynx: no lesions or injection 	  Neck: supple, without adenopathy  Lungs: clear to auscultation  Heart: regular rate and rhythm; no murmur, rubs or gallops  Abdomen: soft, nondistended, nontender, without mass or organomegaly  Skin: sacral wound dressed  Extremities: no clubbing, cyanosis, or edema  Neurologic: poorly interactive    LAB RESULTS:                        8.7    9.13  )-----------( 175      ( 29 Dec 2022 07:34 )             26.7               MICROBIOLOGY:  RECENT CULTURES:  12-26 @ 11:47 Bone SACRAL #4 Escherichia coli    Moderate Escherichia coli  Few Bacteroides fragilis "Susceptibilities not performed"  Few Clostridium ramosum "Susceptibilities not performed"  Moderate Parvimonas micra "Susceptibilities not performed"    No polymorphonuclear leukocytes seen per low power field  Numerous Gram positive cocci in pairs seen per oil power field  Numerous Gram Variable Rods seen per oil power field    12-26 @ 11:45 Bone SACRAL #3 Escherichia coli    Rare Escherichia coli  Mixed Anaerobic Jenise including  Rare Bacteroides fragilis "Susceptibilities not performed"  Few Clostridium ramosum "Susceptibilities not performed"  Moderate Parvimonas micra "Susceptibilities not performed"  Few Anaerobic Gram Positive Cocci Most closely resembling  Peptostreptococcus species "Susceptibilities not performed"    No polymorphonuclear leukocytes seen per low power field  Rare Gram positive cocci in pairs seen per oil power field  Rare Gram Variable Rods seen per oil power field    12-26 @ 11:42 Bone SACRAL #2 Escherichia coli    Few Escherichia coli  Few Mixed Anaerobic Jenise including  Few Bacteroides fragilis "Susceptibilities not performed"  Few Clostridium ramosum "Susceptibilities not performed"  Few Anaerobic Gram Positive Cocci Most closely resembling  Peptostreptococcus species "Susceptibilities not performed"    No polymorphonuclear leukocytes seen per low power field  Moderate Gram positive cocci in pairs seen per oil power field  Moderate Gram Variable Rods seen per oil power field    12-26 @ 11:34 Bone sacral bone     Growth in fluid media only Escherichia coli Susceptibility to follow.  Few Clostridium ramosum "Susceptibilities not performed"  Moderate Solobacterium moorei "Susceptibilities not performed"  Rare Streptococcus anginosus    No polymorphonuclear leukocytes seen per low power field  Few Gram positive cocci in pairs seen per oil power field  Few Gram Variable Rods seen per oil power field    12-24 @ 14:42 Clean Catch Clean Catch (Midstream)     No growth      12-24 @ 11:34 .Blood Blood-Peripheral Blood Culture PCR    Growth in aerobic and anaerobic bottles: Streptococcus anginosus Alpha  hemolytic strep  (not Strep. pneumoniae or Enterococcus)  Single set isolate, possible contaminant. Contact  Microbiology if susceptibility testing clinically  indicated.  Growth in anaerobic bottle: Bacteroides fragilis "Susceptibilities not  performed"  ***Blood Panel PCR results on this specimen are available  approximately 3 hours after the Gram stain result.***  Gram stain, PCR, and/or culture results may not always  correspond due to difference in methodologies.  ************************************************************  This PCR assay was performed by multiplex PCR. This  Assay tests for 66 bacterial and resistance gene targets.  Please refer to the [facility] test directory  at [email or web address] for details.    Upon re-evaluation of gram stain:  Growth in aerobic and anaerobic bottles: Gram Positive Cocci in Pairs and  Chains  Growth in anaerobic bottle: Gram Negative Rods  upon repeat smear after prolonged incubation  Previously reported as:  Growth in aerobic and anaerobic bottles: Gram Positive Cocci in Pairs and  Chains        RADIOLOGY REVIEWED:  < from: CT Chest w/ IV Cont (12.24.22 @ 13:58) >  IMPRESSION:  1. Bilateral pulmonary emboli. Thrombusis identified within the right   femoral vein as well as deep femoral vein consistent with DVT.  2. There is a marginally enhancing air/fluid collection, which may be   multiloculated measuring 4.5 x 2.0 cm within the left gluteal   musculature. Subcutaneous air is identified overlying the inferior sacrum   and medial right gluteal region.  3. Droplets of epidural air are identified at the level of the sacral   canal; the patient is status post laminectomy at L4-L5. Differential   considerations of the epidural air would include extension of the sacral   decubitus infection versus secondary to degenerative change.      < end of copied text >

## 2022-12-29 NOTE — PROGRESS NOTE ADULT - SUBJECTIVE AND OBJECTIVE BOX
Patient underwent Excisional debridement and pulse irrigation of sacral ulcer on 12/26.  Yesterday 12/28 , patient underwent washout of sacral wound, application of microcellular dermal matrix and wound vac placement .     Patient was seen and examined by me and Dr Iglesias this am.  Patient without any complaints on exam.       Vital Signs Last 24 Hrs  T(C): 36.5 (29 Dec 2022 13:17), Max: 36.8 (29 Dec 2022 05:46)  T(F): 97.7 (29 Dec 2022 13:17), Max: 98.3 (29 Dec 2022 05:46)  HR: 93 (29 Dec 2022 13:17) (68 - 93)  BP: 123/81 (29 Dec 2022 13:17) (117/59 - 126/72)  RR: 18 (29 Dec 2022 13:17) (15 - 18)  SpO2: 100% (29 Dec 2022 13:17) (100% - 100%)    Parameters below as of 29 Dec 2022 13:17  Patient On (Oxygen Delivery Method): room air    PAST MEDICAL & SURGICAL HISTORY:  Colon cancer  Dementia  S/P colon resection    MEDICATIONS  (STANDING):  aspirin  chewable 81 milliGRAM(s) Oral daily  brimonidine 0.2% Ophthalmic Solution 1 Drop(s) Right EYE every 12 hours  dorzolamide 2%/timolol 0.5% Ophthalmic Solution 1 Drop(s) Right EYE every 12 hours  midodrine 5 milliGRAM(s) Oral every 8 hours  pantoprazole  Injectable 40 milliGRAM(s) IV Push daily  piperacillin/tazobactam IVPB.. 3.375 Gram(s) IV Intermittent every 8 hours  polyethylene glycol 3350 17 Gram(s) Oral two times a day  senna 2 Tablet(s) Oral at bedtime    MEDICATIONS  (PRN):  acetaminophen     Tablet .. 650 milliGRAM(s) Oral every 6 hours PRN Temp greater or equal to 38C (100.4F), Mild Pain (1 - 3)    PE:   alert but nothing to say   Patient in fetal position but can straighten out when moved.   Back with wound vac in place in sacral area.   Vac functioning, very little drainage since yesterday.   Dressing and sponge clean , no bleeding   or disruption of suction.                            8.7    9.13  )-----------( 175      ( 29 Dec 2022 07:34 )             26.7   12-29    137  |  104  |  29<H>  ----------------------------<  131<H>  3.8   |  26  |  1.36<H>    Ca    7.7<L>      29 Dec 2022 07:34  Phos  3.7     12-29  Mg     1.9     12-29

## 2022-12-29 NOTE — PROVIDER CONTACT NOTE (CRITICAL VALUE NOTIFICATION) - ACTION/TREATMENT ORDERED:
dose decreased by 1ml/hr per order. redraw to be done at 05:59.
Continue zosyn antibiotic
will continue to trend lactate
hold gtt

## 2022-12-29 NOTE — PROVIDER CONTACT NOTE (CRITICAL VALUE NOTIFICATION) - BACKGROUND
Pt has hx of colon ca
Patient admitted with pressure ulcer stage 4
heparin gtt for BL PE and DVT
AI patient   SOB  + PE

## 2022-12-29 NOTE — PROVIDER CONTACT NOTE (CRITICAL VALUE NOTIFICATION) - TEST AND RESULT REPORTED:
trop 140.1
lactate 7.5
.0
lactate 2.4
Blood culture positive growth in aerobic & anaerobic bottle. Gram positive cocci in pairs & chains. Positive growth in anaerobic bottle gram positive cocci in pairs Gram negative rods.
Tissue cultures from 12/26 had no polymorphonuclear leukocytes seen in low power field.  Specimen #1 had few, #2 had moderate, #3 had rare, and #4 had numerous gram positive cocci in pairs seen in oil power field.
+ Bllood Culture
ptt over 200

## 2022-12-29 NOTE — PROGRESS NOTE ADULT - ASSESSMENT
87 year old female is surgically stable s/p Washout of sacral wound, application of microcellular dermal matrix and wound vac placement POD 1   -- hx bacteremia 2/2 sacral ulcer   --  All consultant notes appreciated     Plan:  Continue  wound vac -  do not remove - re inforce if needed and if  vac stops suctioning.            pain management            Antibiotics as per ID            AC restarted

## 2022-12-29 NOTE — PROVIDER CONTACT NOTE (CRITICAL VALUE NOTIFICATION) - ASSESSMENT
pt VSS. mental status at baseline. no signs of bleeding
Continue present care.
Patients VS WDL no fiver
Pt A&ox1

## 2022-12-30 LAB
-  CEFTRIAXONE: SIGNIFICANT CHANGE UP
-  CLINDAMYCIN: SIGNIFICANT CHANGE UP
-  ERYTHROMYCIN: SIGNIFICANT CHANGE UP
-  LEVOFLOXACIN: SIGNIFICANT CHANGE UP
-  PENICILLIN: SIGNIFICANT CHANGE UP
-  VANCOMYCIN: SIGNIFICANT CHANGE UP
ANION GAP SERPL CALC-SCNC: 6 MMOL/L — SIGNIFICANT CHANGE UP (ref 5–17)
BUN SERPL-MCNC: 23 MG/DL — SIGNIFICANT CHANGE UP (ref 7–23)
CALCIUM SERPL-MCNC: 7.4 MG/DL — LOW (ref 8.4–10.5)
CHLORIDE SERPL-SCNC: 106 MMOL/L — SIGNIFICANT CHANGE UP (ref 96–108)
CO2 SERPL-SCNC: 26 MMOL/L — SIGNIFICANT CHANGE UP (ref 22–31)
CREAT SERPL-MCNC: 1.38 MG/DL — HIGH (ref 0.5–1.3)
CULTURE RESULTS: SIGNIFICANT CHANGE UP
EGFR: 37 ML/MIN/1.73M2 — LOW
GLUCOSE SERPL-MCNC: 121 MG/DL — HIGH (ref 70–99)
HCT VFR BLD CALC: 25.1 % — LOW (ref 34.5–45)
HGB BLD-MCNC: 8.2 G/DL — LOW (ref 11.5–15.5)
MAGNESIUM SERPL-MCNC: 2 MG/DL — SIGNIFICANT CHANGE UP (ref 1.6–2.6)
MCHC RBC-ENTMCNC: 29.1 PG — SIGNIFICANT CHANGE UP (ref 27–34)
MCHC RBC-ENTMCNC: 32.7 GM/DL — SIGNIFICANT CHANGE UP (ref 32–36)
MCV RBC AUTO: 89 FL — SIGNIFICANT CHANGE UP (ref 80–100)
METHOD TYPE: SIGNIFICANT CHANGE UP
NRBC # BLD: 0 /100 WBCS — SIGNIFICANT CHANGE UP (ref 0–0)
ORGANISM # SPEC MICROSCOPIC CNT: SIGNIFICANT CHANGE UP
PHOSPHATE SERPL-MCNC: 3.3 MG/DL — SIGNIFICANT CHANGE UP (ref 2.5–4.5)
PLATELET # BLD AUTO: 170 K/UL — SIGNIFICANT CHANGE UP (ref 150–400)
POTASSIUM SERPL-MCNC: 3.5 MMOL/L — SIGNIFICANT CHANGE UP (ref 3.5–5.3)
POTASSIUM SERPL-SCNC: 3.5 MMOL/L — SIGNIFICANT CHANGE UP (ref 3.5–5.3)
RBC # BLD: 2.82 M/UL — LOW (ref 3.8–5.2)
RBC # FLD: 14.7 % — HIGH (ref 10.3–14.5)
SODIUM SERPL-SCNC: 138 MMOL/L — SIGNIFICANT CHANGE UP (ref 135–145)
SPECIMEN SOURCE: SIGNIFICANT CHANGE UP
WBC # BLD: 8.67 K/UL — SIGNIFICANT CHANGE UP (ref 3.8–10.5)
WBC # FLD AUTO: 8.67 K/UL — SIGNIFICANT CHANGE UP (ref 3.8–10.5)

## 2022-12-30 RX ORDER — POTASSIUM CHLORIDE 20 MEQ
40 PACKET (EA) ORAL ONCE
Refills: 0 | Status: COMPLETED | OUTPATIENT
Start: 2022-12-30 | End: 2022-12-30

## 2022-12-30 RX ADMIN — APIXABAN 5 MILLIGRAM(S): 2.5 TABLET, FILM COATED ORAL at 05:47

## 2022-12-30 RX ADMIN — BRIMONIDINE TARTRATE 1 DROP(S): 2 SOLUTION/ DROPS OPHTHALMIC at 06:26

## 2022-12-30 RX ADMIN — SENNA PLUS 2 TABLET(S): 8.6 TABLET ORAL at 00:28

## 2022-12-30 RX ADMIN — DORZOLAMIDE HYDROCHLORIDE TIMOLOL MALEATE 1 DROP(S): 20; 5 SOLUTION/ DROPS OPHTHALMIC at 06:26

## 2022-12-30 RX ADMIN — MIDODRINE HYDROCHLORIDE 5 MILLIGRAM(S): 2.5 TABLET ORAL at 05:47

## 2022-12-30 RX ADMIN — APIXABAN 5 MILLIGRAM(S): 2.5 TABLET, FILM COATED ORAL at 18:06

## 2022-12-30 RX ADMIN — PIPERACILLIN AND TAZOBACTAM 25 GRAM(S): 4; .5 INJECTION, POWDER, LYOPHILIZED, FOR SOLUTION INTRAVENOUS at 05:47

## 2022-12-30 RX ADMIN — MIDODRINE HYDROCHLORIDE 5 MILLIGRAM(S): 2.5 TABLET ORAL at 00:28

## 2022-12-30 RX ADMIN — PIPERACILLIN AND TAZOBACTAM 25 GRAM(S): 4; .5 INJECTION, POWDER, LYOPHILIZED, FOR SOLUTION INTRAVENOUS at 14:41

## 2022-12-30 RX ADMIN — Medication 40 MILLIEQUIVALENT(S): at 16:50

## 2022-12-30 RX ADMIN — MIDODRINE HYDROCHLORIDE 5 MILLIGRAM(S): 2.5 TABLET ORAL at 14:41

## 2022-12-30 RX ADMIN — Medication 650 MILLIGRAM(S): at 07:00

## 2022-12-30 RX ADMIN — POLYETHYLENE GLYCOL 3350 17 GRAM(S): 17 POWDER, FOR SOLUTION ORAL at 05:47

## 2022-12-30 RX ADMIN — Medication 81 MILLIGRAM(S): at 16:46

## 2022-12-30 RX ADMIN — MIDODRINE HYDROCHLORIDE 5 MILLIGRAM(S): 2.5 TABLET ORAL at 22:09

## 2022-12-30 RX ADMIN — BRIMONIDINE TARTRATE 1 DROP(S): 2 SOLUTION/ DROPS OPHTHALMIC at 18:06

## 2022-12-30 RX ADMIN — Medication 650 MILLIGRAM(S): at 06:27

## 2022-12-30 RX ADMIN — PIPERACILLIN AND TAZOBACTAM 25 GRAM(S): 4; .5 INJECTION, POWDER, LYOPHILIZED, FOR SOLUTION INTRAVENOUS at 00:28

## 2022-12-30 RX ADMIN — PIPERACILLIN AND TAZOBACTAM 25 GRAM(S): 4; .5 INJECTION, POWDER, LYOPHILIZED, FOR SOLUTION INTRAVENOUS at 22:09

## 2022-12-30 RX ADMIN — PANTOPRAZOLE SODIUM 40 MILLIGRAM(S): 20 TABLET, DELAYED RELEASE ORAL at 06:19

## 2022-12-30 RX ADMIN — DORZOLAMIDE HYDROCHLORIDE TIMOLOL MALEATE 1 DROP(S): 20; 5 SOLUTION/ DROPS OPHTHALMIC at 18:04

## 2022-12-30 NOTE — PROGRESS NOTE ADULT - SUBJECTIVE AND OBJECTIVE BOX
This is an 87-year-old female  with h/o Dementia, colon cancer s/p resection brought in by EMS to the ED by her daughter for a decubitus ulcer and increasing weakness.  Patient was also short of breath per EMS.  Per patient's daughter she had a small ulcer that began 6 months ago, which they were doing local wound care for, however she reports she has not seen the ulcer in 2 weeks, when she looked at it today she saw that it was large and very deep.  As per daughter, patient has been declining in health for the past year, last seen by pmd 18 months ago. Patient was ambulating with poor balance, became incontinent over 6 months ago. She developed and a sacral ulcer while wearing a diaper and being less mobile. She eventually became more sedentary, bedbound about  2 weeks ago. Sacral ulcer soon developed after that and was noticed by daughter as the size of a coin. Daughter noticed today the ulcer became very large. There was no reported fever, chills no chest pain no nausea vomiting.    In the ED, patient was evaluated for respiratory distress and lower back tenderness. She was initiated on sepsis protocol, IVF resuscitation, started on IV antibiotics, blood cultures and lactic acid levels were obtained. A CT angiogram of chest and CT of abdomen/ pelvis were performed.  where showed Gluteal Abcess, b/l PE, Fem DVT and sacral decub    Critical care and general surgery were both consulted  Surgical consult called and seen patient and not acute surgical candidate      24 hr events: No active issues overnight, no bleeding noted. Wound vac remains in place. Pt appears comfortable.       ## ROS: Unable to obtain given pt cognitive status.       ## Labs:  CBC:                        8.2    8.67  )-----------( 170      ( 30 Dec 2022 06:00 )             25.1     Chem:  12-29    137  |  104  |  29<H>  ----------------------------<  131<H>  3.8   |  26  |  1.36<H>    Ca    7.7<L>      29 Dec 2022 07:34  Phos  3.7     12-29  Mg     1.9     12-29        ## Medications:  piperacillin/tazobactam IVPB.. 3.375 Gram(s) IV Intermittent every 8 hours  midodrine 5 milliGRAM(s) Oral every 8 hours  apixaban 5 milliGRAM(s) Oral two times a day  aspirin  chewable 81 milliGRAM(s) Oral daily  pantoprazole    Tablet 40 milliGRAM(s) Oral before breakfast  polyethylene glycol 3350 17 Gram(s) Oral two times a day  senna 2 Tablet(s) Oral at bedtime  acetaminophen     Tablet .. 650 milliGRAM(s) Oral every 6 hours PRN      ## Vitals:  T(C): 36.3 (12-30-22 @ 05:08), Max: 36.6 (12-29-22 @ 20:50)  HR: 71 (12-30-22 @ 05:08) (71 - 93)  BP: 136/63 (12-30-22 @ 05:08) (120/73 - 136/63)  RR: 16 (12-30-22 @ 05:08) (16 - 18)  SpO2: 99% (12-30-22 @ 05:08) (99% - 100%)        12-29 @ 07:01  -  12-30 @ 07:00  --------------------------------------------------------  IN: 240 mL / OUT: 400 mL / NET: -160 mL        ## P/E:  Gen: lying comfortably in bed in no apparent distress  HEENT: PERRL, EOMI  Resp: CTA, mildly diminished bs b/l, slight increased wob noted, no rales or crackles or wheezing noted  CVS: S1S2 no m/r/g  Abd: soft NT/ND +BS  Ext: 2+ edema b/l  Neuro: awake oriented x2, minimally conversive        CODE STATUS: DNR/DNI, MOLST in place

## 2022-12-30 NOTE — PROGRESS NOTE ADULT - ASSESSMENT
87-year-old female brought in by EMS to the ED by her daughter for a decubitus ulcer.  Patient was also short of breath per EMS.  Per patient's daughter she had a small ulcer that began 6 months ago, which they were doing local wound care for, however she reports she has not seen the ulcer in 2 weeks, when she looked at it today she saw that it was large and very deep.  As per daughter, patient has been declining in health for the past year, last seen by pmd 18 months ago. Patient was ambulating with poor balance, became incontinent over 6 months ago. She developed and a sacral ulcer while wearing a diaper and being less mobile. She eventually became more sedentary, bedbound about  2 weeks ago. Sacral ulcer soon developed after that and was noticed by daughter as the size of a coin. Daughter noticed today the ulcer became very large. There was no reported fever, chills no chest pain no nausea vomiting.    1. Septic shock due to Large sacral decubiti with lactic acidosis  2. Right femoral vein DVT  3. Bilateral pulmonary emboli   4. Elevated troponin levels suspect from PE  5. Colon cancer  6. Severe Protein malnutrition with low hypoalbuminemia  7. S/p sacral debridement     - Continue current management; vitals per unit monitoring policy  - AC changing to eliquid, dosed by pharmacy, OK per surgeon  - wound vac care per surgery  - Continue pain management as per surgery  - Continue supplemental oxygen via nasal cannula prn, wean as tolerated  - Continue antibiotics per ID, IVF hydration with lactated ringer's solution; fup cultures  - Continue Midodrine for borderline bp  - h/h stable  - Monitor i/o's, bun / creatinine; avoid nephrotoxic agents  - diet and nutritional supplements as tolerated  - GI and DVT prophylaxis  - Patient is DNR/DNI with MOLST in chart; no pressors per prior documentation    Pt family updated this AM.

## 2022-12-30 NOTE — CHART NOTE - NSCHARTNOTEFT_GEN_A_CORE
· Subjective and Objective:   Patient underwent Excisional debridement and pulse irrigation of sacral ulcer on 12/26.  Yesterday 12/28 , patient underwent washout of sacral wound, application of microcellular dermal matrix and wound vac placement .     I was asked by Saundra (wound care nurse ) to check sacral wound vac.   Wound vac had lifted near anal opening and was not suctioning properly.  The vac was also no longer bridged properly.  Wound vac reinforced and paste added along anal area.  New bridge was formed to take pressure off skin ,   Wound vac now functioning again at - 125 mm Hg.     Plan :  Discussed with Dr Iglesias, Vac to stay in place until Monday.              Reinforce only - do not remove, micromatrix under vac foam.

## 2022-12-30 NOTE — PROGRESS NOTE ADULT - SUBJECTIVE AND OBJECTIVE BOX
CC: f/u for polymicrobial bacteremia and infected sacral decubitus    Patient is resting in bed, no new c/o    REVIEW OF SYSTEMS:  All other review of systems negative (Comprehensive ROS): limited by patients condition    Antimicrobials Day #  :day 7  piperacillin/tazobactam IVPB.. 3.375 Gram(s) IV Intermittent every 8 hours    Other Medications Reviewed    Vital Signs Last 24 Hrs  T(C): 36.3 (30 Dec 2022 05:08), Max: 36.6 (29 Dec 2022 20:50)  T(F): 97.4 (30 Dec 2022 05:08), Max: 97.9 (29 Dec 2022 20:50)  HR: 71 (30 Dec 2022 05:08) (71 - 93)  BP: 136/63 (30 Dec 2022 05:08) (120/73 - 136/63)  BP(mean): --  RR: 16 (30 Dec 2022 05:08) (16 - 18)  SpO2: 99% (30 Dec 2022 05:08) (99% - 100%)    Parameters below as of 30 Dec 2022 05:08  Patient On (Oxygen Delivery Method): room air        PHYSICAL EXAM:  General: sleepy, no acute distress  Eyes:  anicteric, no conjunctival injection, no discharge  Oropharynx: no lesions or injection 	  Neck: supple, without adenopathy  Lungs: clear to auscultation  Heart: regular rate and rhythm; no murmur, rubs or gallops  Abdomen: soft, nondistended, nontender, without mass or organomegaly  Skin: sacral wound dressed, vac in place  Extremities: no clubbing, cyanosis, or edema  Neurologic: poorly interactive    LAB RESULTS:                         MICROBIOLOGY:  RECENT CULTURES:  12-26 @ 11:47 Bone SACRAL #4 Escherichia coli    Moderate Escherichia coli  Few Bacteroides fragilis "Susceptibilities not performed"  Few Clostridium ramosum "Susceptibilities not performed"  Moderate Parvimonas micra "Susceptibilities not performed"    No polymorphonuclear leukocytes seen per low power field  Numerous Gram positive cocci in pairs seen per oil power field  Numerous Gram Variable Rods seen per oil power field    12-26 @ 11:45 Bone SACRAL #3 Escherichia coli    Rare Escherichia coli  Mixed Anaerobic Jenise including  Rare Bacteroides fragilis "Susceptibilities not performed"  Few Clostridium ramosum "Susceptibilities not performed"  Moderate Parvimonas micra "Susceptibilities not performed"  Few Anaerobic Gram Positive Cocci Most closely resembling  Peptostreptococcus species "Susceptibilities not performed"    No polymorphonuclear leukocytes seen per low power field  Rare Gram positive cocci in pairs seen per oil power field  Rare Gram Variable Rods seen per oil power field    12-26 @ 11:42 Bone SACRAL #2 Escherichia coli    Few Escherichia coli  Few Mixed Anaerobic Jenise including  Few Bacteroides fragilis "Susceptibilities not performed"  Few Clostridium ramosum "Susceptibilities not performed"  Few Anaerobic Gram Positive Cocci Most closely resembling  Peptostreptococcus species "Susceptibilities not performed"    No polymorphonuclear leukocytes seen per low power field  Moderate Gram positive cocci in pairs seen per oil power field  Moderate Gram Variable Rods seen per oil power field    12-26 @ 11:34 Bone sacral bone     Growth in fluid media only Escherichia coli Susceptibility to follow.  Few Clostridium ramosum "Susceptibilities not performed"  Moderate Solobacterium moorei "Susceptibilities not performed"  Rare Streptococcus anginosus    No polymorphonuclear leukocytes seen per low power field  Few Gram positive cocci in pairs seen per oil power field  Few Gram Variable Rods seen per oil power field    12-24 @ 14:42 Clean Catch Clean Catch (Midstream)     No growth      12-24 @ 11:34 .Blood Blood-Peripheral Blood Culture PCR    Growth in aerobic and anaerobic bottles: Streptococcus anginosus Alpha  hemolytic strep  (not Strep. pneumoniae or Enterococcus)  Single set isolate, possible contaminant. Contact  Microbiology if susceptibility testing clinically  indicated.  Growth in anaerobic bottle: Bacteroides fragilis "Susceptibilities not  performed"  ***Blood Panel PCR results on this specimen are available  approximately 3 hours after the Gram stain result.***  Gram stain, PCR, and/or culture results may not always  correspond due to difference in methodologies.  ************************************************************  This PCR assay was performed by multiplex PCR. This  Assay tests for 66 bacterial and resistance gene targets.  Please refer to the [facility] test directory  at [email or web address] for details.    Upon re-evaluation of gram stain:  Growth in aerobic and anaerobic bottles: Gram Positive Cocci in Pairs and  Chains  Growth in anaerobic bottle: Gram Negative Rods  upon repeat smear after prolonged incubation  Previously reported as:  Growth in aerobic and anaerobic bottles: Gram Positive Cocci in Pairs and  Chains        RADIOLOGY REVIEWED:  < from: CT Chest w/ IV Cont (12.24.22 @ 13:58) >  IMPRESSION:  1. Bilateral pulmonary emboli. Thrombusis identified within the right   femoral vein as well as deep femoral vein consistent with DVT.  2. There is a marginally enhancing air/fluid collection, which may be   multiloculated measuring 4.5 x 2.0 cm within the left gluteal   musculature. Subcutaneous air is identified overlying the inferior sacrum   and medial right gluteal region.  3. Droplets of epidural air are identified at the level of the sacral   canal; the patient is status post laminectomy at L4-L5. Differential   considerations of the epidural air would include extension of the sacral   decubitus infection versus secondary to degenerative change.      < end of copied text >

## 2022-12-30 NOTE — PROGRESS NOTE ADULT - ASSESSMENT
86 yo F hx colon CA, dementia, bedbound, necrotizing decubitus ulcer with extension to gluteal area and some epidural air at level of sacrum as well with growth of strep species and bacteroides from the blood. The strep has been identified as a strep anginosis.  She also has PE  and DVT.  Now s/p excisional debridement of sacral decub POD#3  she is s/p application of micromatrix on 12/28  Bone culture with mixed anaerobes as well as a sensitive E coli  TTE without vegetations  Recommendations:  1 continue zosyn for now, day 7  2. Off loading, and local wound care will be critical  3. Will consider extending antibiotic course after zosyn with oral augmentin, if concern of residual osteo  4. Repeat blood cultures if she spikes a fever.

## 2022-12-31 DIAGNOSIS — C18.9 MALIGNANT NEOPLASM OF COLON, UNSPECIFIED: ICD-10-CM

## 2022-12-31 DIAGNOSIS — Z29.9 ENCOUNTER FOR PROPHYLACTIC MEASURES, UNSPECIFIED: ICD-10-CM

## 2022-12-31 DIAGNOSIS — F03.90 UNSPECIFIED DEMENTIA WITHOUT BEHAVIORAL DISTURBANCE: ICD-10-CM

## 2022-12-31 DIAGNOSIS — I26.99 OTHER PULMONARY EMBOLISM WITHOUT ACUTE COR PULMONALE: ICD-10-CM

## 2022-12-31 LAB
ANION GAP SERPL CALC-SCNC: 8 MMOL/L — SIGNIFICANT CHANGE UP (ref 5–17)
BUN SERPL-MCNC: 23 MG/DL — SIGNIFICANT CHANGE UP (ref 7–23)
CALCIUM SERPL-MCNC: 7.7 MG/DL — LOW (ref 8.4–10.5)
CHLORIDE SERPL-SCNC: 107 MMOL/L — SIGNIFICANT CHANGE UP (ref 96–108)
CO2 SERPL-SCNC: 24 MMOL/L — SIGNIFICANT CHANGE UP (ref 22–31)
CREAT SERPL-MCNC: 1.14 MG/DL — SIGNIFICANT CHANGE UP (ref 0.5–1.3)
CULTURE RESULTS: SIGNIFICANT CHANGE UP
EGFR: 47 ML/MIN/1.73M2 — LOW
GLUCOSE SERPL-MCNC: 109 MG/DL — HIGH (ref 70–99)
HCT VFR BLD CALC: 25.3 % — LOW (ref 34.5–45)
HGB BLD-MCNC: 8.2 G/DL — LOW (ref 11.5–15.5)
MAGNESIUM SERPL-MCNC: 2 MG/DL — SIGNIFICANT CHANGE UP (ref 1.6–2.6)
MCHC RBC-ENTMCNC: 29.9 PG — SIGNIFICANT CHANGE UP (ref 27–34)
MCHC RBC-ENTMCNC: 32.4 GM/DL — SIGNIFICANT CHANGE UP (ref 32–36)
MCV RBC AUTO: 92.3 FL — SIGNIFICANT CHANGE UP (ref 80–100)
NRBC # BLD: 0 /100 WBCS — SIGNIFICANT CHANGE UP (ref 0–0)
ORGANISM # SPEC MICROSCOPIC CNT: SIGNIFICANT CHANGE UP
ORGANISM # SPEC MICROSCOPIC CNT: SIGNIFICANT CHANGE UP
PHOSPHATE SERPL-MCNC: 3.1 MG/DL — SIGNIFICANT CHANGE UP (ref 2.5–4.5)
PLATELET # BLD AUTO: 163 K/UL — SIGNIFICANT CHANGE UP (ref 150–400)
POTASSIUM SERPL-MCNC: 3.5 MMOL/L — SIGNIFICANT CHANGE UP (ref 3.5–5.3)
POTASSIUM SERPL-SCNC: 3.5 MMOL/L — SIGNIFICANT CHANGE UP (ref 3.5–5.3)
RBC # BLD: 2.74 M/UL — LOW (ref 3.8–5.2)
RBC # FLD: 15.4 % — HIGH (ref 10.3–14.5)
SODIUM SERPL-SCNC: 139 MMOL/L — SIGNIFICANT CHANGE UP (ref 135–145)
SPECIMEN SOURCE: SIGNIFICANT CHANGE UP
WBC # BLD: 6.84 K/UL — SIGNIFICANT CHANGE UP (ref 3.8–10.5)
WBC # FLD AUTO: 6.84 K/UL — SIGNIFICANT CHANGE UP (ref 3.8–10.5)

## 2022-12-31 PROCEDURE — 99232 SBSQ HOSP IP/OBS MODERATE 35: CPT

## 2022-12-31 RX ADMIN — Medication 81 MILLIGRAM(S): at 12:20

## 2022-12-31 RX ADMIN — PIPERACILLIN AND TAZOBACTAM 25 GRAM(S): 4; .5 INJECTION, POWDER, LYOPHILIZED, FOR SOLUTION INTRAVENOUS at 06:00

## 2022-12-31 RX ADMIN — SENNA PLUS 2 TABLET(S): 8.6 TABLET ORAL at 21:47

## 2022-12-31 RX ADMIN — PIPERACILLIN AND TAZOBACTAM 25 GRAM(S): 4; .5 INJECTION, POWDER, LYOPHILIZED, FOR SOLUTION INTRAVENOUS at 21:48

## 2022-12-31 RX ADMIN — PIPERACILLIN AND TAZOBACTAM 25 GRAM(S): 4; .5 INJECTION, POWDER, LYOPHILIZED, FOR SOLUTION INTRAVENOUS at 13:52

## 2022-12-31 RX ADMIN — DORZOLAMIDE HYDROCHLORIDE TIMOLOL MALEATE 1 DROP(S): 20; 5 SOLUTION/ DROPS OPHTHALMIC at 05:22

## 2022-12-31 RX ADMIN — MIDODRINE HYDROCHLORIDE 5 MILLIGRAM(S): 2.5 TABLET ORAL at 13:51

## 2022-12-31 RX ADMIN — APIXABAN 5 MILLIGRAM(S): 2.5 TABLET, FILM COATED ORAL at 17:18

## 2022-12-31 RX ADMIN — MIDODRINE HYDROCHLORIDE 5 MILLIGRAM(S): 2.5 TABLET ORAL at 21:47

## 2022-12-31 RX ADMIN — BRIMONIDINE TARTRATE 1 DROP(S): 2 SOLUTION/ DROPS OPHTHALMIC at 05:22

## 2022-12-31 RX ADMIN — MIDODRINE HYDROCHLORIDE 5 MILLIGRAM(S): 2.5 TABLET ORAL at 05:21

## 2022-12-31 RX ADMIN — BRIMONIDINE TARTRATE 1 DROP(S): 2 SOLUTION/ DROPS OPHTHALMIC at 17:18

## 2022-12-31 RX ADMIN — DORZOLAMIDE HYDROCHLORIDE TIMOLOL MALEATE 1 DROP(S): 20; 5 SOLUTION/ DROPS OPHTHALMIC at 17:18

## 2022-12-31 RX ADMIN — APIXABAN 5 MILLIGRAM(S): 2.5 TABLET, FILM COATED ORAL at 05:21

## 2022-12-31 NOTE — PROGRESS NOTE ADULT - PROBLEM SELECTOR PLAN 2
Seen by surgery, now status post excisional debridement and pulse irrigation of sacral ulcer on 12/26.  On 12/28 patient underwent washout of sacral wound, application of microcellular dermal matrix and wound vac placement.  VAC in place, dressing changes per surgical team

## 2022-12-31 NOTE — PROGRESS NOTE ADULT - SUBJECTIVE AND OBJECTIVE BOX
Patient admitted and found to have  polymicrobial bacteremia and infected decubitus ulcer.  Patient was taken to the OR on two separate occassions for Debridement of Decubitus sacral ulcer 12/26 and then again for 2nd Debridement and application of micromatrix and wound vac.   Patient is non verbal and  has dementia so difficult to assess.     Vital Signs Last 24 Hrs  T(C): 36.3 (31 Dec 2022 12:57), Max: 36.6 (30 Dec 2022 20:44)  T(F): 97.4 (31 Dec 2022 12:57), Max: 97.8 (30 Dec 2022 20:44)  HR: 80 (31 Dec 2022 12:57) (60 - 80)  BP: 132/67 (31 Dec 2022 12:57) (132/67 - 150/74)  RR: 17 (31 Dec 2022 12:57) (17 - 18)  SpO2: 100% (31 Dec 2022 12:57) (100% - 100%)    Parameters below as of 31 Dec 2022 12:57  Patient On (Oxygen Delivery Method): room air    PAST MEDICAL & SURGICAL HISTORY:  Colon cancer  Dementia  S/P colon resection        MEDICATIONS  (STANDING):  apixaban 5 milliGRAM(s) Oral two times a day  aspirin  chewable 81 milliGRAM(s) Oral daily  brimonidine 0.2% Ophthalmic Solution 1 Drop(s) Right EYE every 12 hours  dorzolamide 2%/timolol 0.5% Ophthalmic Solution 1 Drop(s) Right EYE every 12 hours  midodrine 5 milliGRAM(s) Oral every 8 hours  pantoprazole    Tablet 40 milliGRAM(s) Oral before breakfast  piperacillin/tazobactam IVPB.. 3.375 Gram(s) IV Intermittent every 8 hours  polyethylene glycol 3350 17 Gram(s) Oral two times a day  senna 2 Tablet(s) Oral at bedtime    PE :    Wound Vac in place over sacral decub bordering and close to anus.    Wound vac functioning with minimal amount of drainage.   Skin around vac appears intact,   Was re inforced yesterday by Saundra Wound care nurse.                            8.2    6.84  )-----------( 163      ( 31 Dec 2022 07:20 )             25.3   12-31    139  |  107  |  23  ----------------------------<  109<H>  3.5   |  24  |  1.14    Ca    7.7<L>      31 Dec 2022 07:20  Phos  3.1     12-31  Mg     2.0     12-31

## 2022-12-31 NOTE — PROGRESS NOTE ADULT - ASSESSMENT
87 year old female with PMH dementia, colon cancer in remission, FTT and sacral decubiti who presented with lethargy and worsening sacral wound, found to have DVT with PE

## 2022-12-31 NOTE — PROGRESS NOTE ADULT - SUBJECTIVE AND OBJECTIVE BOX
CC: f/u for polymicrobial bacteremia and infected decubitus ulcer    Patient reports: she is non verbal, appears comfortable in bed    REVIEW OF SYSTEMS:  All other review of systems negative (Comprehensive ROS): limited by condition    Antimicrobials Day #  :day 8  piperacillin/tazobactam IVPB.. 3.375 Gram(s) IV Intermittent every 8 hours    Other Medications Reviewed  MEDICATIONS  (STANDING):  apixaban 5 milliGRAM(s) Oral two times a day  aspirin  chewable 81 milliGRAM(s) Oral daily  brimonidine 0.2% Ophthalmic Solution 1 Drop(s) Right EYE every 12 hours  dorzolamide 2%/timolol 0.5% Ophthalmic Solution 1 Drop(s) Right EYE every 12 hours  midodrine 5 milliGRAM(s) Oral every 8 hours  pantoprazole    Tablet 40 milliGRAM(s) Oral before breakfast  piperacillin/tazobactam IVPB.. 3.375 Gram(s) IV Intermittent every 8 hours  polyethylene glycol 3350 17 Gram(s) Oral two times a day  senna 2 Tablet(s) Oral at bedtime    T(F): 97.5 (12-31-22 @ 05:35), Max: 97.8 (12-30-22 @ 20:44)  HR: 60 (12-31-22 @ 05:35)  BP: 134/57 (12-31-22 @ 05:35)  RR: 18 (12-31-22 @ 05:35)  SpO2: 100% (12-31-22 @ 05:35)  Wt(kg): --    PHYSICAL EXAM:  General: sleepy,, no acute distress  Eyes:  anicteric, no conjunctival injection, no discharge  Oropharynx: no lesions or injection 	  Neck: supple, without adenopathy  Lungs: clear to auscultation  Heart: regular rate and rhythm; no murmur, rubs or gallops  Abdomen: soft, nondistended, nontender, without mass or organomegaly  Skin: sacral wound with vac.  Extremities: no clubbing, cyanosis, or edema  Neurologic:sleepy, poorly interactive    LAB RESULTS:                        8.2    6.84  )-----------( 163      ( 31 Dec 2022 07:20 )             25.3     12-31    139  |  107  |  23  ----------------------------<  109<H>  3.5   |  24  |  1.14    Ca    7.7<L>      31 Dec 2022 07:20  Phos  3.1     12-31  Mg     2.0     12-31          MICROBIOLOGY:  RECENT CULTURES:  12-26 @ 11:47 Bone SACRAL #4 Escherichia coli    Moderate Escherichia coli  Few Bacteroides fragilis "Susceptibilities not performed"  Few Clostridium ramosum "Susceptibilities not performed"  Moderate Parvimonas micra "Susceptibilities not performed"    No polymorphonuclear leukocytes seen per low power field  Numerous Gram positive cocci in pairs seen per oil power field  Numerous Gram Variable Rods seen per oil power field    12-26 @ 11:45 Bone SACRAL #3 Escherichia coli    Rare Escherichia coli  Mixed Anaerobic Jenise including  Rare Bacteroides fragilis "Susceptibilities not performed"  Few Clostridium ramosum "Susceptibilities not performed"  Moderate Parvimonas micra "Susceptibilities not performed"  Few Anaerobic Gram Positive Cocci Most closely resembling  Peptostreptococcus species "Susceptibilities not performed"    No polymorphonuclear leukocytes seen per low power field  Rare Gram positive cocci in pairs seen per oil power field  Rare Gram Variable Rods seen per oil power field    12-26 @ 11:42 Bone SACRAL #2 Escherichia coli    Few Escherichia coli  Few Mixed Anaerobic Jenise including  Few Bacteroides fragilis "Susceptibilities not performed"  Few Clostridium ramosum "Susceptibilities not performed"  Few Anaerobic Gram Positive Cocci Most closely resembling  Peptostreptococcus species "Susceptibilities not performed"    No polymorphonuclear leukocytes seen per low power field  Moderate Gram positive cocci in pairs seen per oil power field  Moderate Gram Variable Rods seen per oil power field    12-26 @ 11:34 Bone sacral bone Escherichia coli  Streptococcus anginosus    Growth in fluid media only Escherichia coli  Rare Streptococcus anginosus  Few Clostridium ramosum "Susceptibilities not performed"  Moderate Solobacterium moorei "Susceptibilities not performed"    No polymorphonuclear leukocytes seen per low power field  Few Gram positive cocci in pairs seen per oil power field  Few Gram Variable Rods seen per oil power field        RADIOLOGY REVIEWED:

## 2022-12-31 NOTE — PROGRESS NOTE ADULT - ASSESSMENT
87 year old female with hx of colon ca , dementia  in hospice care presented with polymicrobial bacteremia and infected decubitus ulcer.     Plan:   Discontinue wound vac on Monday and begin dressing changes.            Plan for discharge to home hospice as per medicine

## 2023-01-01 LAB
HCT VFR BLD CALC: 25.5 % — LOW (ref 34.5–45)
HGB BLD-MCNC: 8.3 G/DL — LOW (ref 11.5–15.5)
MCHC RBC-ENTMCNC: 29.4 PG — SIGNIFICANT CHANGE UP (ref 27–34)
MCHC RBC-ENTMCNC: 32.5 GM/DL — SIGNIFICANT CHANGE UP (ref 32–36)
MCV RBC AUTO: 90.4 FL — SIGNIFICANT CHANGE UP (ref 80–100)
NRBC # BLD: 0 /100 WBCS — SIGNIFICANT CHANGE UP (ref 0–0)
PLATELET # BLD AUTO: 199 K/UL — SIGNIFICANT CHANGE UP (ref 150–400)
RBC # BLD: 2.82 M/UL — LOW (ref 3.8–5.2)
RBC # FLD: 15.9 % — HIGH (ref 10.3–14.5)
WBC # BLD: 6.13 K/UL — SIGNIFICANT CHANGE UP (ref 3.8–10.5)
WBC # FLD AUTO: 6.13 K/UL — SIGNIFICANT CHANGE UP (ref 3.8–10.5)

## 2023-01-01 RX ADMIN — POLYETHYLENE GLYCOL 3350 17 GRAM(S): 17 POWDER, FOR SOLUTION ORAL at 17:50

## 2023-01-01 RX ADMIN — SENNA PLUS 2 TABLET(S): 8.6 TABLET ORAL at 21:45

## 2023-01-01 RX ADMIN — PIPERACILLIN AND TAZOBACTAM 25 GRAM(S): 4; .5 INJECTION, POWDER, LYOPHILIZED, FOR SOLUTION INTRAVENOUS at 05:35

## 2023-01-01 RX ADMIN — DORZOLAMIDE HYDROCHLORIDE TIMOLOL MALEATE 1 DROP(S): 20; 5 SOLUTION/ DROPS OPHTHALMIC at 17:51

## 2023-01-01 RX ADMIN — PIPERACILLIN AND TAZOBACTAM 25 GRAM(S): 4; .5 INJECTION, POWDER, LYOPHILIZED, FOR SOLUTION INTRAVENOUS at 14:54

## 2023-01-01 RX ADMIN — POLYETHYLENE GLYCOL 3350 17 GRAM(S): 17 POWDER, FOR SOLUTION ORAL at 05:36

## 2023-01-01 RX ADMIN — MIDODRINE HYDROCHLORIDE 5 MILLIGRAM(S): 2.5 TABLET ORAL at 14:52

## 2023-01-01 RX ADMIN — MIDODRINE HYDROCHLORIDE 5 MILLIGRAM(S): 2.5 TABLET ORAL at 21:45

## 2023-01-01 RX ADMIN — APIXABAN 5 MILLIGRAM(S): 2.5 TABLET, FILM COATED ORAL at 17:50

## 2023-01-01 RX ADMIN — MIDODRINE HYDROCHLORIDE 5 MILLIGRAM(S): 2.5 TABLET ORAL at 05:35

## 2023-01-01 RX ADMIN — Medication 81 MILLIGRAM(S): at 11:15

## 2023-01-01 RX ADMIN — PANTOPRAZOLE SODIUM 40 MILLIGRAM(S): 20 TABLET, DELAYED RELEASE ORAL at 05:36

## 2023-01-01 RX ADMIN — DORZOLAMIDE HYDROCHLORIDE TIMOLOL MALEATE 1 DROP(S): 20; 5 SOLUTION/ DROPS OPHTHALMIC at 05:36

## 2023-01-01 RX ADMIN — PIPERACILLIN AND TAZOBACTAM 25 GRAM(S): 4; .5 INJECTION, POWDER, LYOPHILIZED, FOR SOLUTION INTRAVENOUS at 21:45

## 2023-01-01 RX ADMIN — BRIMONIDINE TARTRATE 1 DROP(S): 2 SOLUTION/ DROPS OPHTHALMIC at 17:51

## 2023-01-01 RX ADMIN — BRIMONIDINE TARTRATE 1 DROP(S): 2 SOLUTION/ DROPS OPHTHALMIC at 05:36

## 2023-01-01 RX ADMIN — APIXABAN 5 MILLIGRAM(S): 2.5 TABLET, FILM COATED ORAL at 05:35

## 2023-01-01 NOTE — PROGRESS NOTE ADULT - ASSESSMENT
87-year-old female brought in by EMS to the ED by her daughter for a decubitus ulcer.  Patient was also short of breath per EMS.  Per patient's daughter she had a small ulcer that began 6 months ago, which they were doing local wound care for, however she reports she has not seen the ulcer in 2 weeks, when she looked at it today she saw that it was large and very deep.  As per daughter, patient has been declining in health for the past year, last seen by pmd 18 months ago. Patient was ambulating with poor balance, became incontinent over 6 months ago. She developed and a sacral ulcer while wearing a diaper and being less mobile. She eventually became more sedentary, bedbound about  2 weeks ago. Sacral ulcer soon developed after that and was noticed by daughter as the size of a coin. Daughter noticed today the ulcer became very large. There was no reported fever, chills no chest pain no nausea vomiting.    1. Septic shock due to Large sacral decubiti with lactic acidosis  2. Right femoral vein DVT  3. Bilateral pulmonary emboli   4. Elevated troponin levels suspect from PE  5. Colon cancer  6. Severe Protein malnutrition with low hypoalbuminemia  7. S/p sacral debridement     - Continue current management; vitals per unit monitoring policy  - continue oral AC, monitor for s/s of bleeding and trend h/h; currently stable  - wound vac care per surgery  - Continue pain management as per surgery  - Continue supplemental oxygen via nasal cannula prn, wean as tolerated  - Continue antibiotics per ID, IVF hydration with lactated ringer's solution; fup cultures  - Continue Midodrine for borderline bp  - Monitor i/o's, bun / creatinine; avoid nephrotoxic agents  - diet and nutritional supplements as tolerated  - GI and DVT prophylaxis  - Patient is DNR/DNI with MOLST in chart; no pressors per prior documentation    Pt family updated this AM.

## 2023-01-01 NOTE — PROGRESS NOTE ADULT - SUBJECTIVE AND OBJECTIVE BOX
CC: f/u for polymicrobial bacteremia secondary to infected decubitus ulcer    Patient reports: she is sleepy, poorly interactive    REVIEW OF SYSTEMS:  All other review of systems negative (Comprehensive ROS)    Antimicrobials Day #  :day 9/14  piperacillin/tazobactam IVPB.. 3.375 Gram(s) IV Intermittent every 8 hours    Other Medications Reviewed  MEDICATIONS  (STANDING):  apixaban 5 milliGRAM(s) Oral two times a day  aspirin  chewable 81 milliGRAM(s) Oral daily  brimonidine 0.2% Ophthalmic Solution 1 Drop(s) Right EYE every 12 hours  dorzolamide 2%/timolol 0.5% Ophthalmic Solution 1 Drop(s) Right EYE every 12 hours  midodrine 5 milliGRAM(s) Oral every 8 hours  pantoprazole    Tablet 40 milliGRAM(s) Oral before breakfast  piperacillin/tazobactam IVPB.. 3.375 Gram(s) IV Intermittent every 8 hours  polyethylene glycol 3350 17 Gram(s) Oral two times a day  senna 2 Tablet(s) Oral at bedtime    T(F): 98.2 (01-01-23 @ 12:07), Max: 98.3 (01-01-23 @ 05:37)  HR: 75 (01-01-23 @ 12:07)  BP: 120/50 (01-01-23 @ 12:07)  RR: 18 (01-01-23 @ 12:07)  SpO2: 99% (01-01-23 @ 12:07)  Wt(kg): --    PHYSICAL EXAM:  General: sleepy,no acute distress  Eyes:  anicteric, no conjunctival injection, no discharge  Oropharynx: no lesions or injection 	  Neck: supple, without adenopathy  Lungs: clear to auscultation  Heart: regular rate and rhythm; no murmur, rubs or gallops  Abdomen: soft, nondistended, nontender, without mass or organomegaly  Skin: no lesions  Extremities: no clubbing, cyanosis, or edema  Neurologic: poorly interactive    LAB RESULTS:                        8.3    6.13  )-----------( 199      ( 01 Jan 2023 06:50 )             25.5     12-31    139  |  107  |  23  ----------------------------<  109<H>  3.5   |  24  |  1.14    Ca    7.7<L>      31 Dec 2022 07:20  Phos  3.1     12-31  Mg     2.0     12-31          MICROBIOLOGY:  RECENT CULTURES:      RADIOLOGY REVIEWED:

## 2023-01-01 NOTE — PROGRESS NOTE ADULT - SUBJECTIVE AND OBJECTIVE BOX
This is an 87-year-old female  with h/o Dementia, colon cancer s/p resection brought in by EMS to the ED by her daughter for a decubitus ulcer and increasing weakness.  Patient was also short of breath per EMS.  Per patient's daughter she had a small ulcer that began 6 months ago, which they were doing local wound care for, however she reports she has not seen the ulcer in 2 weeks, when she looked at it today she saw that it was large and very deep.  As per daughter, patient has been declining in health for the past year, last seen by pmd 18 months ago. Patient was ambulating with poor balance, became incontinent over 6 months ago. She developed and a sacral ulcer while wearing a diaper and being less mobile. She eventually became more sedentary, bedbound about  2 weeks ago. Sacral ulcer soon developed after that and was noticed by daughter as the size of a coin. Daughter noticed today the ulcer became very large. There was no reported fever, chills no chest pain no nausea vomiting.    In the ED, patient was evaluated for respiratory distress and lower back tenderness. She was initiated on sepsis protocol, IVF resuscitation, started on IV antibiotics, blood cultures and lactic acid levels were obtained. A CT angiogram of chest and CT of abdomen/ pelvis were performed.  where showed Gluteal Abcess, b/l PE, Fem DVT and sacral decub    Critical care and general surgery were both consulted  Surgical consult called and seen patient and not acute surgical candidate     (24 Dec 2022 16:31)      24 hr events: No active issues overnight. Pt remained stable with no obvious s/s of bleeding. Wound vac remains in place per surgeon.       ## ROS:  [ x ] unable to obtain        ## Labs:  CBC:                        8.3    6.13  )-----------( 199      ( 01 Jan 2023 06:50 )             25.5     Chem:  12-31    139  |  107  |  23  ----------------------------<  109<H>  3.5   |  24  |  1.14    Ca    7.7<L>      31 Dec 2022 07:20  Phos  3.1     12-31  Mg     2.0     12-31        ## Medications:  piperacillin/tazobactam IVPB.. 3.375 Gram(s) IV Intermittent every 8 hours  midodrine 5 milliGRAM(s) Oral every 8 hours  apixaban 5 milliGRAM(s) Oral two times a day  aspirin  chewable 81 milliGRAM(s) Oral daily  pantoprazole    Tablet 40 milliGRAM(s) Oral before breakfast  polyethylene glycol 3350 17 Gram(s) Oral two times a day  senna 2 Tablet(s) Oral at bedtime  acetaminophen     Tablet .. 650 milliGRAM(s) Oral every 6 hours PRN      ## Vitals:  T(C): 36.8 (01-01-23 @ 05:37), Max: 36.8 (01-01-23 @ 05:37)  HR: 78 (01-01-23 @ 05:37) (72 - 80)  BP: 119/59 (01-01-23 @ 05:37) (119/59 - 138/67)  RR: 17 (01-01-23 @ 05:37) (17 - 17)  SpO2: 99% (01-01-23 @ 05:37) (99% - 100%)        12-31 @ 07:01 - 01-01 @ 07:00  --------------------------------------------------------  IN: 120 mL / OUT: 800 mL / NET: -680 mL      ## P/E:  Gen: lying comfortably in bed in no apparent distress  HEENT: PERRL, EOMI  Resp: CTA B/L no c/r/w  CVS: S1S2 no m/r/g  Abd: soft NT/ND +BS  Ext: no c/c/e  Neuro: A&Ox1          CODE STATUS: DNR/DNI, MOLST in place

## 2023-01-01 NOTE — PROGRESS NOTE ADULT - ASSESSMENT
86 yo F hx colon CA, dementia, bedbound, necrotizing decubitus ulcer with extension to gluteal area and some epidural air at level of sacrum as well with growth of strep species and bacteroides from the blood. The strep has been identified as a strep anginosis.  She also has PE  and DVT.  Now s/p excisional debridement of sacral decub POD#4  she is s/p application of micromatrix on 12/28  Bone culture with mixed anaerobes as well as a sensitive E coli  TTE without vegetations  She is being medically optimized for home hospice  Recommendations:  1 continue zosyn for now, day 9  2. Off loading, and local wound care will be critical  3. Will consider extending antibiotic course after zosyn with oral augmentin, if concern of residual osteo  4. Repeat blood cultures if she spikes a fever.  5. When wound vac taken down if wound appears acceptable will consider transition to oral augmentin.The goal is a total of 2 weeks of antibiotics, day 9/14.

## 2023-01-02 LAB
HCT VFR BLD CALC: 27.5 % — LOW (ref 34.5–45)
HGB BLD-MCNC: 8.8 G/DL — LOW (ref 11.5–15.5)
MCHC RBC-ENTMCNC: 29.1 PG — SIGNIFICANT CHANGE UP (ref 27–34)
MCHC RBC-ENTMCNC: 32 GM/DL — SIGNIFICANT CHANGE UP (ref 32–36)
MCV RBC AUTO: 91.1 FL — SIGNIFICANT CHANGE UP (ref 80–100)
NRBC # BLD: 0 /100 WBCS — SIGNIFICANT CHANGE UP (ref 0–0)
PLATELET # BLD AUTO: 249 K/UL — SIGNIFICANT CHANGE UP (ref 150–400)
RBC # BLD: 3.02 M/UL — LOW (ref 3.8–5.2)
RBC # FLD: 16.5 % — HIGH (ref 10.3–14.5)
WBC # BLD: 6.61 K/UL — SIGNIFICANT CHANGE UP (ref 3.8–10.5)
WBC # FLD AUTO: 6.61 K/UL — SIGNIFICANT CHANGE UP (ref 3.8–10.5)

## 2023-01-02 PROCEDURE — 70450 CT HEAD/BRAIN W/O DYE: CPT | Mod: 26

## 2023-01-02 RX ADMIN — MIDODRINE HYDROCHLORIDE 5 MILLIGRAM(S): 2.5 TABLET ORAL at 13:39

## 2023-01-02 RX ADMIN — SENNA PLUS 2 TABLET(S): 8.6 TABLET ORAL at 22:05

## 2023-01-02 RX ADMIN — APIXABAN 5 MILLIGRAM(S): 2.5 TABLET, FILM COATED ORAL at 05:47

## 2023-01-02 RX ADMIN — BRIMONIDINE TARTRATE 1 DROP(S): 2 SOLUTION/ DROPS OPHTHALMIC at 18:14

## 2023-01-02 RX ADMIN — Medication 81 MILLIGRAM(S): at 12:48

## 2023-01-02 RX ADMIN — PIPERACILLIN AND TAZOBACTAM 25 GRAM(S): 4; .5 INJECTION, POWDER, LYOPHILIZED, FOR SOLUTION INTRAVENOUS at 05:48

## 2023-01-02 RX ADMIN — MIDODRINE HYDROCHLORIDE 5 MILLIGRAM(S): 2.5 TABLET ORAL at 05:48

## 2023-01-02 RX ADMIN — POLYETHYLENE GLYCOL 3350 17 GRAM(S): 17 POWDER, FOR SOLUTION ORAL at 18:14

## 2023-01-02 RX ADMIN — DORZOLAMIDE HYDROCHLORIDE TIMOLOL MALEATE 1 DROP(S): 20; 5 SOLUTION/ DROPS OPHTHALMIC at 18:14

## 2023-01-02 RX ADMIN — POLYETHYLENE GLYCOL 3350 17 GRAM(S): 17 POWDER, FOR SOLUTION ORAL at 05:47

## 2023-01-02 RX ADMIN — APIXABAN 5 MILLIGRAM(S): 2.5 TABLET, FILM COATED ORAL at 18:14

## 2023-01-02 RX ADMIN — BRIMONIDINE TARTRATE 1 DROP(S): 2 SOLUTION/ DROPS OPHTHALMIC at 05:48

## 2023-01-02 RX ADMIN — DORZOLAMIDE HYDROCHLORIDE TIMOLOL MALEATE 1 DROP(S): 20; 5 SOLUTION/ DROPS OPHTHALMIC at 05:48

## 2023-01-02 RX ADMIN — Medication 1 TABLET(S): at 18:14

## 2023-01-02 RX ADMIN — PANTOPRAZOLE SODIUM 40 MILLIGRAM(S): 20 TABLET, DELAYED RELEASE ORAL at 07:45

## 2023-01-02 RX ADMIN — MIDODRINE HYDROCHLORIDE 5 MILLIGRAM(S): 2.5 TABLET ORAL at 22:05

## 2023-01-02 NOTE — PROGRESS NOTE ADULT - ASSESSMENT
87-year-old female brought in by EMS to the ED by her daughter for a decubitus ulcer.  Patient was also short of breath per EMS.  Per patient's daughter she had a small ulcer that began 6 months ago, which they were doing local wound care for, however she reports she has not seen the ulcer in 2 weeks, when she looked at it today she saw that it was large and very deep.  As per daughter, patient has been declining in health for the past year, last seen by pmd 18 months ago. Patient was ambulating with poor balance, became incontinent over 6 months ago. She developed and a sacral ulcer while wearing a diaper and being less mobile. She eventually became more sedentary, bedbound about  2 weeks ago. Sacral ulcer soon developed after that and was noticed by daughter as the size of a coin. Daughter noticed today the ulcer became very large. There was no reported fever, chills no chest pain no nausea vomiting.    1. Septic shock due to Large sacral decubiti with lactic acidosis  2. Right femoral vein DVT  3. Bilateral pulmonary emboli   4. Elevated troponin levels suspect from PE  5. Colon cancer  6. Severe Protein malnutrition with low hypoalbuminemia  7. S/p sacral debridement     - Continue current management on AI service for now.  - Continue on room air, monitor oxygen saturation  - continue oral AC, monitor for s/s of bleeding and follow up CBC  - wound vac care to be d/c by surgery today  - Continue pain management as per surgery  - Complete 14 day course of antibiotics as  per ID  - F/u cultures and ID recommendations  - Continue diet and nutrition requirements as tolerated.   - Gentle IVF hydration as needed  - Continue Midodrine for hypotension  - Monitor urine output and renal function; avoid nephrotoxic agents  - GI and DVT prophylaxis  - Patient is DNR/DNI with MOLST in chart; no pressors per prior documentation  - Case d/w patient's daughter - updated on 1/2/23 (    87-year-old female brought in by EMS to the ED by her daughter for a decubitus ulcer.  Patient was also short of breath per EMS.  Per patient's daughter she had a small ulcer that began 6 months ago, which they were doing local wound care for, however she reports she has not seen the ulcer in 2 weeks, when she looked at it today she saw that it was large and very deep.  As per daughter, patient has been declining in health for the past year, last seen by pmd 18 months ago. Patient was ambulating with poor balance, became incontinent over 6 months ago. She developed and a sacral ulcer while wearing a diaper and being less mobile. She eventually became more sedentary, bedbound about  2 weeks ago. Sacral ulcer soon developed after that and was noticed by daughter as the size of a coin. Daughter noticed today the ulcer became very large. There was no reported fever, chills no chest pain no nausea vomiting.    1. Septic shock due to Large sacral decubiti with lactic acidosis  2. Right femoral vein DVT  3. Bilateral pulmonary emboli   4. Elevated troponin levels suspect from PE  5. Colon cancer  6. Severe Protein malnutrition with low hypoalbuminemia  7. S/p sacral debridement     - Continue current management on AI service for now.  - Continue on room air, monitor oxygen saturation  - continue oral AC, monitor for s/s of bleeding and follow up CBC  - wound vac care to be d/c by surgery today  - Continue pain management as per surgery  - Complete 14 day course of antibiotics as  per ID. Day 10/14 today  - Possible switch to oral antibiotics. F/u cultures and ID recommendations  - Continue diet and nutrition requirements as tolerated.   - Gentle IVF hydration as needed  - Continue Midodrine for hypotension  - Monitor urine output and renal function; avoid nephrotoxic agents  - Head CT and PT consult to evaluate for decreased ambulation  - GI and DVT prophylaxis  - Patient is DNR/DNI with MOLST in chart; no pressors per HCP  - Case d/w patient's daughter - updated on 1/2/23 ([phone])  - Palliative team and  to d/c GOC and disposition 87-year-old female brought in by EMS to the ED by her daughter for a decubitus ulcer.  Patient was also short of breath per EMS.  Per patient's daughter she had a small ulcer that began 6 months ago, which they were doing local wound care for, however she reports she has not seen the ulcer in 2 weeks, when she looked at it today she saw that it was large and very deep.  As per daughter, patient has been declining in health for the past year, last seen by pmd 18 months ago. Patient was ambulating with poor balance, became incontinent over 6 months ago. She developed and a sacral ulcer while wearing a diaper and being less mobile. She eventually became more sedentary, bedbound about  2 weeks ago. Sacral ulcer soon developed after that and was noticed by daughter as the size of a coin. Daughter noticed today the ulcer became very large. There was no reported fever, chills no chest pain no nausea vomiting.    1. Septic shock due to Large sacral decubiti with lactic acidosis  2. Right femoral vein DVT  3. Bilateral pulmonary emboli   4. Elevated troponin levels suspect from PE  5. Colon cancer  6. Severe Protein malnutrition with low hypoalbuminemia  7. S/p sacral debridement     - Continue current management on AI service for now.  - Continue on room air, monitor oxygen saturation  - continue oral AC, monitor for s/s of bleeding and follow up CBC  - wound vac care to be d/c by surgery today  - Continue pain management as per surgery  - Complete 14 day course of antibiotics as  per ID. Day 11/14 today  - Possible switch to oral antibiotics. F/u cultures and ID recommendations  - Continue diet and nutrition requirements as tolerated.   - Gentle IVF hydration as needed  - Continue Midodrine for hypotension, hold for SBP > 120   - Monitor urine output and renal function; avoid nephrotoxic agents  - Head CT and PT consult to evaluate for decreased ambulation  - GI and DVT prophylaxis  - Patient is DNR/DNI with MOLST in chart; no pressors per HCP  - Case d/w patient's daughter - updated on 1/2/23 ([phone])  - Palliative team and  to d/c GOC and disposition

## 2023-01-02 NOTE — PROGRESS NOTE ADULT - SUBJECTIVE AND OBJECTIVE BOX
Patient is a 87y old  Female who presents with a chief complaint of decubitus ulcer (01 Jan 2023 14:56)      Events last 24 hours:   Patient had a BM yesterday. She had an uneventful night. She remains afebrile, with oxygen saturation of 100% on room air. She is voiding via primafit. She has no complaints.    PAST MEDICAL & SURGICAL HISTORY:  Colon cancer  Dementia  S/P colon resection        Medications:  piperacillin/tazobactam IVPB.. 3.375 Gram(s) IV Intermittent every 8 hours  midodrine 5 milliGRAM(s) Oral every 8 hours  acetaminophen     Tablet .. 650 milliGRAM(s) Oral every 6 hours PRN  apixaban 5 milliGRAM(s) Oral two times a day  aspirin  chewable 81 milliGRAM(s) Oral daily  pantoprazole    Tablet 40 milliGRAM(s) Oral before breakfast  polyethylene glycol 3350 17 Gram(s) Oral two times a day  senna 2 Tablet(s) Oral at bedtime    brimonidine 0.2% Ophthalmic Solution 1 Drop(s) Right EYE every 12 hours  dorzolamide 2%/timolol 0.5% Ophthalmic Solution 1 Drop(s) Right EYE every 12 hours            ICU Vital Signs Last 24 Hrs  T(C): 36.6 (02 Jan 2023 05:49), Max: 36.8 (01 Jan 2023 12:07)  T(F): 97.8 (02 Jan 2023 05:49), Max: 98.2 (01 Jan 2023 12:07)  HR: 68 (02 Jan 2023 05:49) (67 - 75)  BP: 136/70 (02 Jan 2023 05:49) (120/50 - 136/70)  BP(mean): --  ABP: --  ABP(mean): --  RR: 18 (02 Jan 2023 05:49) (18 - 18)  SpO2: 100% (02 Jan 2023 05:49) (99% - 100%)    O2 Parameters below as of 02 Jan 2023 05:49  Patient On (Oxygen Delivery Method): room air              I&O's Detail    01 Jan 2023 07:01  -  02 Jan 2023 07:00  --------------------------------------------------------  IN:    Oral Fluid: 100 mL  Total IN: 100 mL    OUT:    Voided (mL): 200 mL  Total OUT: 200 mL    Total NET: -100 mL          LABS:                        8.8    6.61  )-----------( 249      ( 02 Jan 2023 06:45 )             27.5             CULTURES:  Culture Results:   Moderate Escherichia coli  Few Bacteroides fragilis "Susceptibilities not performed"  Few Clostridium ramosum "Susceptibilities not performed"  Moderate Parvimonas micra "Susceptibilities not performed" (12-26-22 @ 11:47)  Culture Results:   Rare Escherichia coli  Mixed Anaerobic Jenise including  Rare Bacteroides fragilis "Susceptibilities not performed"  Few Clostridium ramosum "Susceptibilities not performed"  Moderate Parvimonas micra "Susceptibilities not performed"  Few Anaerobic Gram Positive Cocci Most closely resembling  Peptostreptococcus species "Susceptibilities not performed" (12-26-22 @ 11:45)  Culture Results:   Few Escherichia coli  Few Mixed Anaerobic Jenise including  Few Bacteroides fragilis "Susceptibilities not performed"  Few Clostridium ramosum "Susceptibilities not performed"  Few Anaerobic Gram Positive Cocci Most closely resembling  Peptostreptococcus species "Susceptibilities not performed" (12-26-22 @ 11:42)  Culture Results:   Growth in fluid media only Escherichia coli  Rare Streptococcus anginosus  Few Clostridium ramosum "Susceptibilities not performed"  Moderate Solobacterium moorei "Susceptibilities not performed" (12-26-22 @ 11:34)        Physical Examination:    General: No acute distress.  Alert, oriented, interactive, minimally verbal    HEENT: Pupils equal, reactive to light.  Symmetric.    PULM: Clear to auscultation bilaterally, no significant sputum production    CVS: Regular rate and rhythm, no murmurs, rubs, or gallops    ABD: Soft, nondistended, nontender, normoactive bowel sounds, no masses    Back: Wound Vac intact    EXT: 1+ edema, nontender, no cyanosis        CRITICAL CARE TIME SPENT:  minutes of critical care time spent providing medical care for patient's acute illness/conditions that impairs at least one vital organ system and/or poses a high risk of imminent or life threatening deterioration in the patient's condition. It includes time spent evaluating and treating the patient's acute illness as well as time spent reviewing labs, radiology, discussing goals of care with patient and/or patient's family, and discussing the case with a multidisciplinary team, including the eICU, in an effort to prevent further life threatening deterioration or end organ damage. This time is independent of any procedures performed.

## 2023-01-02 NOTE — PROGRESS NOTE ADULT - SUBJECTIVE AND OBJECTIVE BOX
CC: f/u for  polymicrobic bacteremia, infected decubitus  Patient reports nothing intelligible    REVIEW OF SYSTEMS:  All other review of systems negative (Comprehensive ROS), cannot get    Antimicrobials Day #  :10/42  piperacillin/tazobactam IVPB.. 3.375 Gram(s) IV Intermittent every 8 hours    Other Medications Reviewed    T(F): 97.9 (01-02-23 @ 12:56), Max: 97.9 (01-02-23 @ 12:56)  HR: 89 (01-02-23 @ 12:56)  BP: 134/67 (01-02-23 @ 12:56)  RR: 18 (01-02-23 @ 12:56)  SpO2: 98% (01-02-23 @ 12:56)  Wt(kg): --    PHYSICAL EXAM:  General: alert, no acute distress  Eyes:  anicteric, no conjunctival injection, no discharge  Oropharynx: no lesions or injection 	  Neck: supple, without adenopathy  Lungs: clear to auscultation  Heart: regular rate and rhythm; no murmur, rubs or gallops  Abdomen: soft, nondistended, nontender, without mass or organomegaly  Skin: no lesions  Extremities: no clubbing, cyanosis, or edema, legs are contracted under her, vac in place over decubitus  Neurologic: alert, , moves upper extremities    LAB RESULTS:                        8.8    6.61  )-----------( 249      ( 02 Jan 2023 06:45 )             27.5               MICROBIOLOGY:  RECENT CULTURES:      RADIOLOGY REVIEWED:    < from: CT Chest w/ IV Cont (12.24.22 @ 13:58) >    ACC: [id number] EXAM:  CT ABDOMEN AND PELVIS IC                        ACC: [id number] EXAM:  CT CHEST IC                          PROCEDURE DATE:  12/24/2022          INTERPRETATION:  CLINICAL INFORMATION: Shortness of breath, evaluate for   DVT. Fever, assess for sacral decubitus ulceration.    COMPARISON: None.    CONTRAST/COMPLICATIONS:  IV Contrast: Omnipaque 350 (accession [id number]), IV contrast documented   in unlinked concurrent exam (accession [id number])  90 cc administered   10   cc discarded  Oral Contrast: NONE  Complications: None reported at time of study completion    PROCEDURE:  CT Angiography of the Chest was performed followed by portal venous phase   imaging of the Abdomen and Pelvis.  Sagittal and coronal reformats were performed as well as 3D (MIP)   reconstructions.    FINDINGS:  CHEST:  LUNGS AND LARGE AIRWAYS: Patent central airways. Opacity within the   inferior/posterior aspect of the right lower lobe may represent   atelectasis versus infiltrate.  PLEURA: Small bilateral pleural effusions.  VESSELS: Bilateral pulmonary emboli identified.  HEART: Heart size is normal. No pericardial effusion.  MEDIASTINUM AND SOLITARIO: No lymphadenopathy.  CHEST WALL AND LOWER NECK: Hypodense nodules measuring up to 7 mm   identified within the visualized portion of the thyroid parenchyma.    ABDOMEN AND PELVIS:  LIVER: Within normal limits.  BILE DUCTS: Normal caliber.  GALLBLADDER: Not visualized.  SPLEEN: Within normal limits.  PANCREAS: Within normal limits.  ADRENALS: Within normal limits.  KIDNEYS/URETERS: No hydronephrosis. No renal calculi. Subcentimeter   hypodense foci are noted within the bilateral renal parenchyma, too small   to characterize.    BLADDER: Within normal limits.  REPRODUCTIVE ORGANS: Uterus and adnexa within normal limits.    BOWEL: Abundant fecal material scattered throughout the colon and rectum.   An element of fecal rectal impaction cannot be fully excluded. Appendix   is not visualized.  PERITONEUM: No ascites.  VESSELS: Thrombus is identified within the right femoral vein as well as   deep femoral vein consistent with DVT.  RETROPERITONEUM/LYMPH NODES: No lymphadenopathy.  ABDOMINAL WALL: There is a marginally enhancing air/fluid collection,   which may be multiloculated measuring 4.5x 2.0 cm within the left   gluteal musculature. Subcutaneous air is identified overlying the   inferior sacrum and medial right gluteal region. Diffuse anasarca.  BONES: Droplets of epidural air are identified at the level of the sacral   canal; the patient is status post laminectomy at L4-L5. Differential   considerations of the epidural air would include extension of the sacral   decubitus infection versus secondary to degenerative change.    IMPRESSION:  1. Bilateral pulmonary emboli. Thrombusis identified within the right   femoral vein as well as deep femoral vein consistent with DVT.  2. There is a marginally enhancing air/fluid collection, which may be   multiloculated measuring 4.5 x 2.0 cm within the left gluteal   musculature. Subcutaneous air is identified overlying the inferior sacrum   and medial right gluteal region.  3. Droplets of epidural air are identified at the level of the sacral   canal; the patient is status post laminectomy at L4-L5. Differential   considerations of the epidural air would include extension of the sacral   decubitus infection versus secondary to degenerative change.      Findings were discussed with Dr. GETACHEW MAYORGA [phone] 12/24/2022   2:44 PM by Dr. Maloney with read back confirmation.    --- End of Report ---        < end of copied text >            Assessment:  Elderly woman with dementia admitted 12/24 with a necrotic decubitus after becoming totally bedridden in the recent past, polymicrobic bacteremia, underwent debridement x 2 and matrix with vac. Given that air extended to epidural space will plan on 6 weeks of antibiotics.   Plan:  will change to po augmentin for another 32 days  continue local wound care per surgery team

## 2023-01-02 NOTE — CHART NOTE - NSCHARTNOTEFT_GEN_A_CORE
Nutrition Follow Up Note  Hospital Course (Per Electronic Medical Record):   Source: Medical Record [X] Nursing Staff [X]     Diet: Regula Ensure High Protein BID     Patient tolerating diet , no n/v noted , po intake appears to vary from fair-good (50-75% ) as per nursing flow sheet , PCA reports patient consuming Ensure supplements , Patient  s/p excisional debridement of sacral decub POD#5, IV Zosyn noted wound vac noted .     Current Weight: (1/2) 114.6/52kg - weight noted stable                          (1/1)114.1/51.8kg                          (12/31) 113.2/51.6kg                  Pertinent Medications: MEDICATIONS  (STANDING):  apixaban 5 milliGRAM(s) Oral two times a day  aspirin  chewable 81 milliGRAM(s) Oral daily  brimonidine 0.2% Ophthalmic Solution 1 Drop(s) Right EYE every 12 hours  dorzolamide 2%/timolol 0.5% Ophthalmic Solution 1 Drop(s) Right EYE every 12 hours  midodrine 5 milliGRAM(s) Oral every 8 hours  pantoprazole    Tablet 40 milliGRAM(s) Oral before breakfast  piperacillin/tazobactam IVPB.. 3.375 Gram(s) IV Intermittent every 8 hours  polyethylene glycol 3350 17 Gram(s) Oral two times a day  senna 2 Tablet(s) Oral at bedtime    MEDICATIONS  (PRN):  acetaminophen     Tablet .. 650 milliGRAM(s) Oral every 6 hours PRN Temp greater or equal to 38C (100.4F), Mild Pain (1 - 3)      Pertinent Labs:  12-31 Na139 mmol/L Glu 109 mg/dL<H> K+ 3.5 mmol/L Cr  1.14 mg/dL BUN 23 mg/dL 12-31 Phos 3.1 mg/dL 12-25 Chol 110 mg/dL LDL --    HDL 37 mg/dL<L> Trig 66 mg/dL Hgb/Hct 8.8/27.5g/dl <L> Mg 2.0mg/dl         Skin: sacrum unstageable , stage II (L) hip, Stage I (R) hip - suggest MVI , Vit C & Zinc sulfate to aid with wound healing      Edema: (+2) generalized     Last BM: (1/1) fecal incontinence noted     Estimated Needs:   [X] No Change since Previous Assessment      Previous Nutrition Diagnosis: Severe Protein Calorie Malnutrition & increased nutrient needs    Nutrition Diagnosis is [X] Ongoing       New Nutrition Diagnosis: [X] Not Applicable      Interventions:   1. Recommend MVI , Vit C & Zinc sulfate to promote wound healing   2. nursing to encourage po intake     Monitoring & Evaluation: will monitor:  [X] Weights   [X] PO Intake   [X] Follow Up (Per Protocol)  [X] Tolerance to Diet Prescription       RD to follow as per Nutrition protocol  Mariela King RDN

## 2023-01-03 LAB
ANION GAP SERPL CALC-SCNC: 8 MMOL/L — SIGNIFICANT CHANGE UP (ref 5–17)
BUN SERPL-MCNC: 21 MG/DL — SIGNIFICANT CHANGE UP (ref 7–23)
CALCIUM SERPL-MCNC: 7.7 MG/DL — LOW (ref 8.4–10.5)
CHLORIDE SERPL-SCNC: 110 MMOL/L — HIGH (ref 96–108)
CO2 SERPL-SCNC: 21 MMOL/L — LOW (ref 22–31)
CREAT SERPL-MCNC: 0.92 MG/DL — SIGNIFICANT CHANGE UP (ref 0.5–1.3)
EGFR: 60 ML/MIN/1.73M2 — SIGNIFICANT CHANGE UP
GLUCOSE SERPL-MCNC: 97 MG/DL — SIGNIFICANT CHANGE UP (ref 70–99)
HCT VFR BLD CALC: 30 % — LOW (ref 34.5–45)
HGB BLD-MCNC: 9.5 G/DL — LOW (ref 11.5–15.5)
MAGNESIUM SERPL-MCNC: 2 MG/DL — SIGNIFICANT CHANGE UP (ref 1.6–2.6)
MCHC RBC-ENTMCNC: 29.4 PG — SIGNIFICANT CHANGE UP (ref 27–34)
MCHC RBC-ENTMCNC: 31.7 GM/DL — LOW (ref 32–36)
MCV RBC AUTO: 92.9 FL — SIGNIFICANT CHANGE UP (ref 80–100)
NRBC # BLD: 0 /100 WBCS — SIGNIFICANT CHANGE UP (ref 0–0)
PHOSPHATE SERPL-MCNC: 2.9 MG/DL — SIGNIFICANT CHANGE UP (ref 2.5–4.5)
PLATELET # BLD AUTO: 250 K/UL — SIGNIFICANT CHANGE UP (ref 150–400)
POTASSIUM SERPL-MCNC: 3.6 MMOL/L — SIGNIFICANT CHANGE UP (ref 3.5–5.3)
POTASSIUM SERPL-SCNC: 3.6 MMOL/L — SIGNIFICANT CHANGE UP (ref 3.5–5.3)
RBC # BLD: 3.23 M/UL — LOW (ref 3.8–5.2)
RBC # FLD: 17.4 % — HIGH (ref 10.3–14.5)
SODIUM SERPL-SCNC: 139 MMOL/L — SIGNIFICANT CHANGE UP (ref 135–145)
WBC # BLD: 8.3 K/UL — SIGNIFICANT CHANGE UP (ref 3.8–10.5)
WBC # FLD AUTO: 8.3 K/UL — SIGNIFICANT CHANGE UP (ref 3.8–10.5)

## 2023-01-03 PROCEDURE — 99232 SBSQ HOSP IP/OBS MODERATE 35: CPT

## 2023-01-03 RX ADMIN — MIDODRINE HYDROCHLORIDE 5 MILLIGRAM(S): 2.5 TABLET ORAL at 14:00

## 2023-01-03 RX ADMIN — DORZOLAMIDE HYDROCHLORIDE TIMOLOL MALEATE 1 DROP(S): 20; 5 SOLUTION/ DROPS OPHTHALMIC at 05:40

## 2023-01-03 RX ADMIN — POLYETHYLENE GLYCOL 3350 17 GRAM(S): 17 POWDER, FOR SOLUTION ORAL at 05:41

## 2023-01-03 RX ADMIN — SENNA PLUS 2 TABLET(S): 8.6 TABLET ORAL at 21:40

## 2023-01-03 RX ADMIN — APIXABAN 5 MILLIGRAM(S): 2.5 TABLET, FILM COATED ORAL at 05:40

## 2023-01-03 RX ADMIN — Medication 81 MILLIGRAM(S): at 13:01

## 2023-01-03 RX ADMIN — MIDODRINE HYDROCHLORIDE 5 MILLIGRAM(S): 2.5 TABLET ORAL at 05:40

## 2023-01-03 RX ADMIN — MIDODRINE HYDROCHLORIDE 5 MILLIGRAM(S): 2.5 TABLET ORAL at 21:44

## 2023-01-03 RX ADMIN — DORZOLAMIDE HYDROCHLORIDE TIMOLOL MALEATE 1 DROP(S): 20; 5 SOLUTION/ DROPS OPHTHALMIC at 18:03

## 2023-01-03 RX ADMIN — BRIMONIDINE TARTRATE 1 DROP(S): 2 SOLUTION/ DROPS OPHTHALMIC at 18:03

## 2023-01-03 RX ADMIN — PANTOPRAZOLE SODIUM 40 MILLIGRAM(S): 20 TABLET, DELAYED RELEASE ORAL at 05:39

## 2023-01-03 RX ADMIN — APIXABAN 5 MILLIGRAM(S): 2.5 TABLET, FILM COATED ORAL at 18:04

## 2023-01-03 RX ADMIN — Medication 1 TABLET(S): at 18:03

## 2023-01-03 RX ADMIN — BRIMONIDINE TARTRATE 1 DROP(S): 2 SOLUTION/ DROPS OPHTHALMIC at 05:40

## 2023-01-03 NOTE — PROGRESS NOTE ADULT - PROBLEM SELECTOR PLAN 2
Seen by surgery, now status post excisional debridement and pulse irrigation of sacral ulcer on 12/26.  On 12/28 patient underwent washout of sacral wound, application of microcellular dermal matrix and wound vac placement.  VAC removed by surgical team today, wet to dry dressing in place Seen by surgery, now status post excisional debridement and pulse irrigation of sacral ulcer on 12/26.  On 12/28 patient underwent washout of sacral wound, application of microcellular dermal matrix and wound vac placement.  VAC removed by surgical team today, wet to dry dressing in place  but plan to replace as believe VAC will help wound healing.   d/w Palliative care and surgery - wont be able to go to home hospice with wound vac.

## 2023-01-03 NOTE — PROGRESS NOTE ADULT - ASSESSMENT
A/P 87-year-old female brought in by EMS to the ED by her daughter for a decubitus ulcer.  Patient was also short of breath per EMS.  Per patient's daughter she had a small ulcer that began 6 months ago, which they were doing local wound care for, however she reports she has not seen the ulcer in 2 weeks, when she looked at it recently,  she saw that it was large and very deep.  As per daughter, patient has been declining in health for the past year, last seen by pmd 18 months ago. Patient was ambulating with poor balance, became incontinent over 6 months ago.   Admitted for sepsis likely from wound. S/p wound debridement and washout  12/28.     hx colon CA, dementia, bedbound, p/w, necrotizing decubitus ulcer with extension to gluteal area and some epidural air at level of sacrum as well with growth of strep species and bacteroides from the blood.  Surgery  follows - wound vac to remain in place per surgery     Assessment/Plans:      Septic shock due to Large sacral decubiti with lactic acidosis- ID following on IV antbx    Right femoral vein DVT   Bilateral pulmonary emboli    Elevated troponin levels suspect from PE   Hx Colon cancer   Severe Protein malnutrition with low hypoalbuminemia   S/p sacral debridement in OR , and washout 12/28 - surgery following    now w/ wound vac in place     - Continue current management;  - f/u plan per surgery   - Continue pain management as per surgery  - Continue supplemental oxygen via nasal cannula prn, wean as tolerated  - Continue antibiotics per ID, IVF hydration  -  wound vac per surgery     Palliative : as a f/u, case d/w ccu team this AM, and chart reviewed. Pt in bed, now w/ wound vac in place , s/p surgery for wound debridement and washout. D/w surgery  this AM, wound vac will be replaced and stay in place for now.   Discussed that [facility] unable to take pt w/ wound vac in place, pt can go on to home care services until wound vac d/c'd , then pt can transition to home hospice .    CM/SW are  aware that [facility] hospice unable to take pt at this time.  Can consider home care services  that can handle wound vacs, or [facility] stay  until wound vac removed.   Hospice can be re- consulted as an outpt once wound vac removed.  Called pts  daughter today , left msg asking for return   call.   Discussed case w/ ccu,  surgery  and cm/sw today - currently plan in process for home care services while on wound vac , then to transition to home hospice .

## 2023-01-03 NOTE — PROGRESS NOTE ADULT - SUBJECTIVE AND OBJECTIVE BOX
Awake alert and interactive, appears comfortable.  Tolerating PO when fed, voiding with assistance of incontinence device, had soft stool this AM.    Vital Signs Last 24 Hrs  T(C): 36.4 (02 Jan 2023 20:08), Max: 36.6 (02 Jan 2023 12:56)  T(F): 97.6 (02 Jan 2023 20:08), Max: 97.9 (02 Jan 2023 12:56)  HR: 72 (02 Jan 2023 20:08) (72 - 89)  BP: 126/51 (02 Jan 2023 20:08) (126/51 - 134/67)  RR: 20 (02 Jan 2023 20:08) (18 - 20)  SpO2: 97% RA (02 Jan 2023 20:08) (97% - 98%)    Labs                        9.5    8.30  )-----------( 250      ( 03 Jan 2023 07:10 )             30.0     139  |  110<H>  |  21  ----------------------------<  97  3.6   |  21<L>  |  0.92    Ca    7.7<L>      03 Jan 2023 07:10  Phos  2.9     01-03  Mg     2.0     01-03    Current Medications  amoxicillin  875 milliGRAM(s)/clavulanate 1 Tablet(s) Oral every 24 hours  apixaban 5 milliGRAM(s) Oral two times a day  aspirin  chewable 81 milliGRAM(s) Oral daily  brimonidine 0.2% Ophthalmic Solution 1 Drop(s) Right EYE every 12 hours  dorzolamide 2%/timolol 0.5% Ophthalmic Solution 1 Drop(s) Right EYE every 12 hours  midodrine 5 milliGRAM(s) Oral every 8 hours  pantoprazole    Tablet 40 milliGRAM(s) Oral before breakfast  polyethylene glycol 3350 17 Gram(s) Oral two times a day  senna 2 Tablet(s) Oral at bedtime  acetaminophen     Tablet .. 650 milliGRAM(s) Oral every 6 hours PRN Temp greater or equal to 38C (100.4F), Mild Pain (1 - 3)    Physical Exam  Gen:  WN/WD Female resting in bed, NAD  ENT:  NC/AT, no JVD noted  Thorax:  Symmetric, no retractions  Lung:  CTA b/l  CV:  S1, S2. RRR  Abd:  Soft, NT/ND.  BS normoactive, no masses to palp  Extrem:  No C/C/E, lower extremities contracted  Neuro:  No gross motor/sensory deficits  Psych:  Alert and oriented x3, calm and cooperative

## 2023-01-03 NOTE — PROGRESS NOTE ADULT - SUBJECTIVE AND OBJECTIVE BOX
Patient admitted and found to have  polymicrobial bacteremia and infected decubitus ulcer. Taken to the OR on two separate occassions for Debridement of Decubitus sacral ulcer 12/26 and then again for 2nd Debridement and application of micromatrix and wound vac. Patient is non verbal and  has dementia so difficult to assess. Wound vac removed by surgeon.       PAST MEDICAL & SURGICAL HISTORY:  Colon cancer      Dementia      S/P colon resection      MEDICATIONS  (STANDING):  amoxicillin  875 milliGRAM(s)/clavulanate 1 Tablet(s) Oral every 24 hours  apixaban 5 milliGRAM(s) Oral two times a day  aspirin  chewable 81 milliGRAM(s) Oral daily  brimonidine 0.2% Ophthalmic Solution 1 Drop(s) Right EYE every 12 hours  dorzolamide 2%/timolol 0.5% Ophthalmic Solution 1 Drop(s) Right EYE every 12 hours  midodrine 5 milliGRAM(s) Oral every 8 hours  pantoprazole    Tablet 40 milliGRAM(s) Oral before breakfast  polyethylene glycol 3350 17 Gram(s) Oral two times a day  senna 2 Tablet(s) Oral at bedtime    MEDICATIONS  (PRN):  acetaminophen     Tablet .. 650 milliGRAM(s) Oral every 6 hours PRN Temp greater or equal to 38C (100.4F), Mild Pain (1 - 3)    Gen: in bed NAD  wound: sacral [id number] cm wound with slough no foul smell or drainage noted, no eschar  on distal aspect with 2x3 cm granulation opening, mild slough, no eschar or drainage noted.

## 2023-01-03 NOTE — PROGRESS NOTE ADULT - SUBJECTIVE AND OBJECTIVE BOX
Progress: awake, w/ stim , weakened     Review of Systems: Unable to obtain due to poor mentation    MEDICATIONS  (STANDING):  amoxicillin  875 milliGRAM(s)/clavulanate 1 Tablet(s) Oral every 24 hours  apixaban 5 milliGRAM(s) Oral two times a day  aspirin  chewable 81 milliGRAM(s) Oral daily  brimonidine 0.2% Ophthalmic Solution 1 Drop(s) Right EYE every 12 hours  dorzolamide 2%/timolol 0.5% Ophthalmic Solution 1 Drop(s) Right EYE every 12 hours  midodrine 5 milliGRAM(s) Oral every 8 hours  pantoprazole    Tablet 40 milliGRAM(s) Oral before breakfast  polyethylene glycol 3350 17 Gram(s) Oral two times a day  senna 2 Tablet(s) Oral at bedtime    MEDICATIONS  (PRN):  acetaminophen     Tablet .. 650 milliGRAM(s) Oral every 6 hours PRN Temp greater or equal to 38C (100.4F), Mild Pain (1 - 3)      PHYSICAL EXAM:  Vital Signs Last 24 Hrs  T(C): 36.5 (03 Jan 2023 13:43), Max: 36.5 (03 Jan 2023 13:43)  T(F): 97.7 (03 Jan 2023 13:43), Max: 97.7 (03 Jan 2023 13:43)  HR: 90 (03 Jan 2023 13:43) (72 - 90)  BP: 100/67 (03 Jan 2023 13:43) (100/67 - 126/51)  BP(mean): --  RR: 18 (03 Jan 2023 13:43) (18 - 20)  SpO2: 92% (03 Jan 2023 13:43) (92% - 97%)    Parameters below as of 03 Jan 2023 13:43  Patient On (Oxygen Delivery Method): room air      General: alert  , w/ stim., oriented to self        HEENT: n/c, a/t     Lungs: dim to bases    CV: normal  - tachy   GI: abd soft     : normal/prima fit     Musculoskeletal: normal, weakened    Skin: pale, w/d, st 4     Neuro: + deficits   Oral intake ability:  oral feeding- minimal  Diet: as henry      LABS:                          9.5    8.30  )-----------( 250      ( 03 Jan 2023 07:10 )             30.0     01-03    139  |  110<H>  |  21  ----------------------------<  97  3.6   |  21<L>  |  0.92    Ca    7.7<L>      03 Jan 2023 07:10  Phos  2.9     01-03  Mg     2.0     01-03          RADIOLOGY & ADDITIONAL STUDIES: < from: CT Head No Cont (01.02.23 @ 17:18) >  ACC: [id number] EXAM:  CT BRAIN                          PROCEDURE DATE:  01/02/2023          INTERPRETATION:  CLINICAL INFORMATION: Alteration of consciousness.    TECHNIQUE:  Axial CT images were acquired through the head.  Intravenous contrast: None  Two-dimensional reformats were generated.    COMPARISON STUDY: None    FINDINGS:  There is no CT evidence of acute intracranial hemorrhage, mass effect,   midline shift or acute territorial infarct.    There is generalized cerebral and cerebellar volume loss, which is   focally severe in both temporal lobes.  Mild periventricular white matter   hypoattenuation is compatible with chronic microvascular ischemic   disease.  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    The visualized paranasal sinuses are clear.    The mastoids are clear bilaterally.  Soft tissue attenuation material   opacifying the right external auditory canal probably represents cerumen.    The patient is status post intraocular lens replacementbilaterally.    The calvarium and skull base appear within normal limits.    IMPRESSION:  No CT evidence of acute intracranial pathology.          ADVANCE DIRECTIVES: Molst dnr/dni no feeding tubes   Advanced Care Planning discussion total time spent:

## 2023-01-03 NOTE — PROGRESS NOTE ADULT - ASSESSMENT
87 year old female with PMH dementia, colon cancer in remission, FTT and sacral decubiti who presented with lethargy and worsening sacral wound, found to have DVT with PE, surgery following for sacral wound.

## 2023-01-04 ENCOUNTER — TRANSCRIPTION ENCOUNTER (OUTPATIENT)
Age: 88
End: 2023-01-04

## 2023-01-04 LAB
HCT VFR BLD CALC: 27.7 % — LOW (ref 34.5–45)
HGB BLD-MCNC: 8.5 G/DL — LOW (ref 11.5–15.5)
MCHC RBC-ENTMCNC: 29.1 PG — SIGNIFICANT CHANGE UP (ref 27–34)
MCHC RBC-ENTMCNC: 30.7 GM/DL — LOW (ref 32–36)
MCV RBC AUTO: 94.9 FL — SIGNIFICANT CHANGE UP (ref 80–100)
NRBC # BLD: 0 /100 WBCS — SIGNIFICANT CHANGE UP (ref 0–0)
PLATELET # BLD AUTO: 281 K/UL — SIGNIFICANT CHANGE UP (ref 150–400)
RBC # BLD: 2.92 M/UL — LOW (ref 3.8–5.2)
RBC # FLD: 18.1 % — HIGH (ref 10.3–14.5)
SARS-COV-2 RNA SPEC QL NAA+PROBE: SIGNIFICANT CHANGE UP
WBC # BLD: 7.99 K/UL — SIGNIFICANT CHANGE UP (ref 3.8–10.5)
WBC # FLD AUTO: 7.99 K/UL — SIGNIFICANT CHANGE UP (ref 3.8–10.5)

## 2023-01-04 RX ORDER — ASPIRIN/CALCIUM CARB/MAGNESIUM 324 MG
1 TABLET ORAL
Qty: 90 | Refills: 0
Start: 2023-01-04

## 2023-01-04 RX ORDER — DORZOLAMIDE HYDROCHLORIDE TIMOLOL MALEATE 20; 5 MG/ML; MG/ML
1 SOLUTION/ DROPS OPHTHALMIC
Qty: 1 | Refills: 0
Start: 2023-01-04

## 2023-01-04 RX ORDER — APIXABAN 2.5 MG/1
1 TABLET, FILM COATED ORAL
Qty: 180 | Refills: 0
Start: 2023-01-04

## 2023-01-04 RX ORDER — BRIMONIDINE TARTRATE 2 MG/MG
1 SOLUTION/ DROPS OPHTHALMIC
Qty: 1 | Refills: 0
Start: 2023-01-04

## 2023-01-04 RX ORDER — POLYETHYLENE GLYCOL 3350 17 G/17G
17 POWDER, FOR SOLUTION ORAL
Qty: 120 | Refills: 0
Start: 2023-01-04

## 2023-01-04 RX ORDER — ACETAMINOPHEN 500 MG
2 TABLET ORAL
Qty: 120 | Refills: 0
Start: 2023-01-04

## 2023-01-04 RX ORDER — SENNA PLUS 8.6 MG/1
2 TABLET ORAL
Qty: 120 | Refills: 0
Start: 2023-01-04

## 2023-01-04 RX ORDER — PANTOPRAZOLE SODIUM 20 MG/1
1 TABLET, DELAYED RELEASE ORAL
Qty: 30 | Refills: 0
Start: 2023-01-04

## 2023-01-04 RX ORDER — MIDODRINE HYDROCHLORIDE 2.5 MG/1
1 TABLET ORAL
Qty: 90 | Refills: 0
Start: 2023-01-04

## 2023-01-04 RX ADMIN — SENNA PLUS 2 TABLET(S): 8.6 TABLET ORAL at 21:25

## 2023-01-04 RX ADMIN — APIXABAN 5 MILLIGRAM(S): 2.5 TABLET, FILM COATED ORAL at 17:33

## 2023-01-04 RX ADMIN — DORZOLAMIDE HYDROCHLORIDE TIMOLOL MALEATE 1 DROP(S): 20; 5 SOLUTION/ DROPS OPHTHALMIC at 17:33

## 2023-01-04 RX ADMIN — BRIMONIDINE TARTRATE 1 DROP(S): 2 SOLUTION/ DROPS OPHTHALMIC at 17:33

## 2023-01-04 RX ADMIN — DORZOLAMIDE HYDROCHLORIDE TIMOLOL MALEATE 1 DROP(S): 20; 5 SOLUTION/ DROPS OPHTHALMIC at 05:45

## 2023-01-04 RX ADMIN — MIDODRINE HYDROCHLORIDE 5 MILLIGRAM(S): 2.5 TABLET ORAL at 21:25

## 2023-01-04 RX ADMIN — BRIMONIDINE TARTRATE 1 DROP(S): 2 SOLUTION/ DROPS OPHTHALMIC at 05:46

## 2023-01-04 RX ADMIN — MIDODRINE HYDROCHLORIDE 5 MILLIGRAM(S): 2.5 TABLET ORAL at 05:45

## 2023-01-04 RX ADMIN — MIDODRINE HYDROCHLORIDE 5 MILLIGRAM(S): 2.5 TABLET ORAL at 14:36

## 2023-01-04 RX ADMIN — PANTOPRAZOLE SODIUM 40 MILLIGRAM(S): 20 TABLET, DELAYED RELEASE ORAL at 05:45

## 2023-01-04 RX ADMIN — POLYETHYLENE GLYCOL 3350 17 GRAM(S): 17 POWDER, FOR SOLUTION ORAL at 05:44

## 2023-01-04 RX ADMIN — APIXABAN 5 MILLIGRAM(S): 2.5 TABLET, FILM COATED ORAL at 05:46

## 2023-01-04 RX ADMIN — Medication 81 MILLIGRAM(S): at 11:20

## 2023-01-04 RX ADMIN — Medication 1 TABLET(S): at 17:33

## 2023-01-04 NOTE — DISCHARGE NOTE PROVIDER - NSCORESITESY/N_GEN_A_CORE_RD

## 2023-01-04 NOTE — PROGRESS NOTE ADULT - PROBLEM SELECTOR PLAN 1
wound Vac changed today by wound care rn  Pt will require Home Vac for wound care healing  manage as per medicine  discussed with surgeon
wound orders placed, discussed with RN  ABX as per Med/ID  medically optimize for debridement once pt is stable   Discussed with surgeon
with DVT.  Continue apixaban, monitor HH

## 2023-01-04 NOTE — PROGRESS NOTE ADULT - SUBJECTIVE AND OBJECTIVE BOX
Awake, alert and appears comfortable.  Tolerating PO when fed, BM documented earlier today.  Remains on bedrest, voiding with assistance of incontinence device.    Vital Signs Last 24 Hrs  T(C): 36.6 (04 Jan 2023 05:30), Max: 36.7 (03 Jan 2023 20:18)  T(F): 97.8 (04 Jan 2023 05:30), Max: 98.1 (03 Jan 2023 20:18)  HR: 78 (04 Jan 2023 05:30) (78 - 95)  BP: 138/73 (04 Jan 2023 05:30) (100/67 - 138/73)  RR: 20 (04 Jan 2023 05:30) (18 - 20)  SpO2: 98% RA (04 Jan 2023 05:30) (92% - 99%)    Labs                        8.5    7.99  )-----------( 281      ( 04 Jan 2023 07:30 )             27.7     Current Medication  amoxicillin  875 milliGRAM(s)/clavulanate 1 Tablet(s) Oral every 24 hours  apixaban 5 milliGRAM(s) Oral two times a day  aspirin  chewable 81 milliGRAM(s) Oral daily  brimonidine 0.2% Ophthalmic Solution 1 Drop(s) Right EYE every 12 hours  dorzolamide 2%/timolol 0.5% Ophthalmic Solution 1 Drop(s) Right EYE every 12 hours  midodrine 5 milliGRAM(s) Oral every 8 hours  pantoprazole    Tablet 40 milliGRAM(s) Oral before breakfast  polyethylene glycol 3350 17 Gram(s) Oral two times a day  senna 2 Tablet(s) Oral at bedtime  acetaminophen     Tablet .. 650 milliGRAM(s) Oral every 6 hours PRN Temp greater or equal to 38C (100.4F), Mild Pain (1 - 3)    Physical Exam  Gen:  WN/WD Female resting in bed, NAD  ENT:  NC/AT, no JVD noted  Thorax:  Symmetric, no retractions  Lung:  CTA b/l  CV:  S1, S2. RRR  Abd:  Soft, NT/ND.  BS normoactive, no masses to palp  Extrem:  No C/C/E, lower extremities contracted  Neuro:  No gross motor/sensory deficits  Psych:  Alert, calm and cooperative

## 2023-01-04 NOTE — DISCHARGE NOTE PROVIDER - DETAILS OF MALNUTRITION DIAGNOSIS/DIAGNOSES
This patient has been assessed with a concern for Malnutrition and was treated during this hospitalization for the following Nutrition diagnosis/diagnoses:     -  12/25/2022: Severe protein-calorie malnutrition   -  12/25/2022: Underweight (BMI < 19)

## 2023-01-04 NOTE — PROGRESS NOTE ADULT - PROBLEM SELECTOR PLAN 3
Continue medical management and supportive care

## 2023-01-04 NOTE — DISCHARGE NOTE PROVIDER - CARE PROVIDER_API CALL
Liza Iglesias)  Surgery  [address] [place] [address]  Phone: [phone]  Fax: [phone]  Follow Up Time:

## 2023-01-04 NOTE — PROGRESS NOTE ADULT - PROBLEM SELECTOR PROBLEM 1
Sacral decubitus ulcer, stage IV
Ulcer of sacral region, unstageable
Pulmonary embolus

## 2023-01-04 NOTE — PROGRESS NOTE ADULT - SUBJECTIVE AND OBJECTIVE BOX
CC: f/u for infected decibitus    Patient reports hello    REVIEW OF SYSTEMS:  All other review of systems negative (Comprehensive ROS)cannot get    Antimicrobials Day #  :12/42  amoxicillin  875 milliGRAM(s)/clavulanate 1 Tablet(s) Oral every 24 hours    Other Medications Reviewed    T(F): 98 (01-04-23 @ 12:32), Max: 98.1 (01-03-23 @ 20:18)  HR: 89 (01-04-23 @ 12:32)  BP: 129/84 (01-04-23 @ 12:32)  RR: 18 (01-04-23 @ 12:32)  SpO2: 97% (01-04-23 @ 12:32)  Wt(kg): --    PHYSICAL EXAM:  General: alert, no acute distress  Eyes:  anicteric, no conjunctival injection, no discharge  Oropharynx: no lesions or injection 	  Neck: supple, without adenopathy  Lungs: clear to auscultation  Heart: regular rate and rhythm; no murmur, rubs or gallops  Abdomen: soft, nondistended, nontender, without mass or organomegaly  Skin: no lesions  Extremities: no clubbing, cyanosis, or edema, legs contracted. some areas of dti on hips  Neurologic: alert, says hello and chichi, contracted  back vac over wound  LAB RESULTS:                        8.5    7.99  )-----------( 281      ( 04 Jan 2023 07:30 )             27.7     01-03    139  |  110<H>  |  21  ----------------------------<  97  3.6   |  21<L>  |  0.92    Ca    7.7<L>      03 Jan 2023 07:10  Phos  2.9     01-03  Mg     2.0     01-03          MICROBIOLOGY:  RECENT CULTURES:      RADIOLOGY REVIEWED:        < from: CT Head No Cont (01.02.23 @ 17:18) >    ACC: [id number] EXAM:  CT BRAIN                          PROCEDURE DATE:  01/02/2023          INTERPRETATION:  CLINICAL INFORMATION: Alteration of consciousness.    TECHNIQUE:  Axial CT images were acquired through the head.  Intravenous contrast: None  Two-dimensional reformats were generated.    COMPARISON STUDY: None    FINDINGS:  There is no CT evidence of acute intracranial hemorrhage, mass effect,   midline shift or acute territorial infarct.    There is generalized cerebral and cerebellar volume loss, which is   focally severe in both temporal lobes.  Mild periventricular white matter   hypoattenuation is compatible with chronic microvascular ischemic   disease.  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    The visualized paranasal sinuses are clear.    The mastoids are clear bilaterally.  Soft tissue attenuation material   opacifying the right external auditory canal probably represents cerumen.    The patient is status post intraocular lens replacementbilaterally.    The calvarium and skull base appear within normal limits.    IMPRESSION:  No CT evidence of acute intracranial pathology.    --- End of Report ---    < end of copied text >        Assessment:  Elderly woman with dementia admitted 12/24 with a necrotic decubitus after becoming totally bedridden in the recent past, polymicrobic bacteremia, underwent debridement x 2 and matrix with vac. Given that air extended to epidural space will plan on 6 weeks of antibiotics. No toxicity is apparent,. Local care most critical  Plan:  continue  po augmentin for another 30 days  continue local wound care per surgery team  call if further input is needed

## 2023-01-04 NOTE — PROGRESS NOTE ADULT - PROBLEM SELECTOR PLAN 2
Seen by surgery, now status post excisional debridement and pulse irrigation of sacral ulcer on 12/26.  On 12/28 patient underwent washout of sacral wound, application of microcellular dermal matrix and wound vac placement.  VAC placed by surgical team today, continue 32 day course of PO augmentin

## 2023-01-04 NOTE — PROGRESS NOTE ADULT - PROBLEM SELECTOR PLAN 5
On full dose AC for acute DVT/PE  Protonix daily  Diet as tolerated with assistance, bedrest  DNR/DNI  Planning for discharge to home hospice
On full dose AC for acute DVT/PE  Protonix daily  Diet as tolerated with assistance, bedrest  DNR/DNI  For discharge home today, home care services for [facility] care
On full dose AC for acute DVT/PE  Protonix daily  Diet as tolerated with assistance, bedrest  DNR/DNI  Planning for discharge to home hospice when wound VAC discontinued

## 2023-01-04 NOTE — DISCHARGE NOTE PROVIDER - NSDCMRMEDTOKEN_GEN_ALL_CORE_FT
amoxicillin-clavulanate 875 mg-125 mg oral tablet: 1 tab(s) orally every 24 hours  Aspirin Low Dose 81 mg oral tablet, chewable: 1 tab(s) orally once a day  brimonidine 0.2% ophthalmic solution: 1 drop(s) to each affected eye every 12 hours  Cosopt PF 2%-0.5% ophthalmic solution: 1 drop(s) to each affected eye every 12 hours  Eliquis 5 mg oral tablet: 1 tab(s) orally 2 times a day  Innerclean oral tablet: 2 tab(s) orally once a day (at bedtime)  midodrine 5 mg oral tablet: 1 tab(s) orally every 8 hours  MiraLax oral powder for reconstitution: 17 gram(s) orally 2 times a day  Protonix 40 mg oral delayed release tablet: 1 tab(s) orally once a day (before a meal)  Tylenol 325 mg oral tablet: 2 tab(s) orally every 6 hours, As needed, Temp greater or equal to 38C (100.4F), Mild Pain (1 - 3)

## 2023-01-04 NOTE — PROGRESS NOTE ADULT - NS ATTEND AMEND GEN_ALL_CORE FT
pt seen and examined  comfortable   dispo planning later this week
pt seen and examined  comfortable  wound care as per surgery  antibiotics as per id  transition a/c to eliquis  monitor cbc  dispo planning to home hospice once off wound vac vs facility
1. Septic shock due to Large sacral decubiti with lactic acidosis  2. Right femoral vein DVT  3. Bilateral pulmonary emboli   4. Elevated troponin levels suspect from PE  5. Colon cancer  6. Severe Protein malnutrition with low hypoalbuminemia  7. S/p sacral debridement     - Continue current management; vitals per unit monitoring policy  - Heparin gtt with supratherapeutic ptt this AM, gtt stopped with plan for eliquis to begin in AM 12/30 , OK with surgery and doses appropriately with pharmacist   - wound vac care per surgery  - Continue pain management as per surgery  - Continue supplemental oxygen via nasal cannula prn, wean as tolerated  - Continue antibiotics per ID, IVF hydration with lactated ringer's solution; fup cultures  - Continue Midodrine for borderline bp  - h/h stable  - Monitor i/o's, bun / creatinine; avoid nephrotoxic agents
pt seen and examiend comfortable  d/w Surgery today plan for or  Cardio Consult called for clearance  d/w DTR on the phone.  updated on clinical status   discussed high risk OR candidate  all questions answered
1. Septic shock due to Large sacral decubiti with lactic acidosis  2. Right femoral vein DVT  3. Bilateral pulmonary emboli   4. Elevated troponin levels suspect from PE  5. Colon cancer  6. Severe Protein malnutrition with low hypoalbuminemia  7. S/p sacral debridement     - Continue current management  - Heparin infusion was restarted post-op, but held off due to bleed.   - Sacral decubiti debridement yesterday, repacked and dressed by surgery today  - Scheduled for washout of sacral decubiti tomorrow in the OR  - Continue pain management as per surgery  - Continue supplemental oxygen via nasal cannula  - monitor oxygen saturation, BP, heart rate  - Continue antibiotics, IVF hydration with lactated ringer's solution  - Continue Midodrine for borderline bp  - Transfuse 1 unit PRBC   - Follow up post-transfusion CBC  - Restart heparin infusion, follow up coags  - Monitor urine output, kidney function  - NPO after midnight for surgery tomorrow.   - Family does not want vasopressors  - Repeat EKG, chest xray, ABG as needed  - + blood cultures cultures, ABX as per ID  - GI and DVT prophylaxis  - Patient is at risk for sudden death, poor prognosis  - Patient is DNR/DNI with MOLST in chart  - Case d/w dtr, Bruno Dean ([phone]) - updated 12/27/22
Assessment and Plan:   · Assessment	  87-year-old female brought in by EMS to the ED by her daughter for a decubitus ulcer.  Patient was also short of breath per EMS.  Per patient's daughter she had a small ulcer that began 6 months ago, which they were doing local wound care for, however she reports she has not seen the ulcer in 2 weeks, when she looked at it today she saw that it was large and very deep.  As per daughter, patient has been declining in health for the past year, last seen by pmd 18 months ago. Patient was ambulating with poor balance, became incontinent over 6 months ago. She developed and a sacral ulcer while wearing a diaper and being less mobile. She eventually became more sedentary, bedbound about  2 weeks ago. Sacral ulcer soon developed after that and was noticed by daughter as the size of a coin. Daughter noticed today the ulcer became very large. There was no reported fever, chills no chest pain no nausea vomiting.    1. Septic shock due to Large sacral decubiti with lactic acidosis  2. Right femoral vein DVT  3. Bilateral pulmonary emboli   4. Elevated troponin levels suspect from PE  5. Colon cancer  6. Severe Protein malnutrition with low hypoalbuminemia  7. S/p sacral debridement     - Continue current management; vitals per unit monitoring policy  - Heparin infusion held at 0330 in anticipation for RTOR this AM; fup surgeon recc's post op regarding restarting   - Return to OR, plan per surgeon  - Continue pain management as per surgery  - Continue supplemental oxygen via nasal cannula prn, wean as tolerated  - Continue antibiotics per ID, IVF hydration with lactated ringer's solution; fup cultures  - Continue Midodrine for borderline bp  - trend h/h; s/p 1 unit prbc yesterday  - Monitor i/o's, bun / creatinine; avoid nephrotoxic agents  - currently NPO for OR; assess dietary status post op  - GI and DVT prophylaxis  - Patient is DNR/DNI with MOLST in chart; no pressors per prior documentation
pt seen and examined  alert responsive  d/w surgery plan to go to OR on wed for another debridement   d/w DTR bedside  supportive care
pt seen and examined  comfortable  dispo planning when able  ct head requested to see if any organic cause for pt not moving at home.
pt seen and examined  back on wound vac  dispo planning with home care and wound care  would continue eliquis for life or unless patient more ambulatory
pt seen and examined  continue current care  transition to oral Augmentin when ready for d/c, unsure of duration.
pt seen and examined  comfortable.   d/w surgery and palliative care  plan to d/c with wound care /home care @ home.

## 2023-01-04 NOTE — DISCHARGE NOTE PROVIDER - NSDCCPCAREPLAN_GEN_ALL_CORE_FT
PRINCIPAL DISCHARGE DIAGNOSIS  Diagnosis: Sacral decubitus ulcer  Assessment and Plan of Treatment:       SECONDARY DISCHARGE DIAGNOSES  Diagnosis: Pulmonary embolus  Assessment and Plan of Treatment:     Diagnosis: Dementia  Assessment and Plan of Treatment:     Diagnosis: Colon cancer  Assessment and Plan of Treatment:     Diagnosis: DVT, lower extremity  Assessment and Plan of Treatment:      PRINCIPAL DISCHARGE DIAGNOSIS  Diagnosis: Sacral decubitus ulcer  Assessment and Plan of Treatment:       SECONDARY DISCHARGE DIAGNOSES  Diagnosis: Pulmonary embolus  Assessment and Plan of Treatment:     Diagnosis: Dementia  Assessment and Plan of Treatment:     Diagnosis: Colon cancer  Assessment and Plan of Treatment:     Diagnosis: DVT, lower extremity  Assessment and Plan of Treatment:     Diagnosis: Glaucoma  Assessment and Plan of Treatment:      PRINCIPAL DISCHARGE DIAGNOSIS  Diagnosis: Sacral decubitus ulcer  Assessment and Plan of Treatment:       SECONDARY DISCHARGE DIAGNOSES  Diagnosis: DVT, lower extremity  Assessment and Plan of Treatment: plan to continue Eliquis for life    Diagnosis: Pulmonary embolus  Assessment and Plan of Treatment:     Diagnosis: Dementia  Assessment and Plan of Treatment:     Diagnosis: Colon cancer  Assessment and Plan of Treatment:     Diagnosis: Glaucoma  Assessment and Plan of Treatment:

## 2023-01-04 NOTE — DISCHARGE NOTE PROVIDER - HOSPITAL COURSE
87 year old female with PMH dementia, colon cancer s/p remote resection brought in by EMS to the ED by her daughter for a decubitus ulcer and increasing weakness on 12/24.  Patient was also short of breath per EMS.  Per patient's daughter she had a small ulcer that began 6 months ago, which they were doing local wound care for, however she reports she has not seen the ulcer for 2 weeks, when she looked at it on day of presentation she saw that it was large and very deep.  As per daughter, patient had been declining in health for the past year, last seen by PMD 18 months prior. Patient had ambulating with poor balance, became incontinent over 6 months ago. She developed and a sacral ulcer while wearing a diaper and becoming less mobile. She eventually became more sedentary, bedbound about 2 weeks ago prior to admission. Sacral ulcer soon developed after that and was noticed by daughter as the size of a coin. Daughter noticed the ulcer then became very large. There was no reported fever, chills no chest pain no nausea vomiting.    In the ED, patient was evaluated for respiratory distress and lower back tenderness. She was initiated on sepsis protocol, IVF resuscitation, started on IV antibiotics, blood cultures and lactic acid levels were obtained. A CT angiogram of chest and CT of abdomen/ pelvis were performed.  where showed Gluteal Abcess, b/l PE, Fem DVT and sacral decub.  Found to have DVT with PE, started on full anticoagulation.     Seen by surgery, status post excisional debridement and pulse irrigation of sacral ulcer on 12/26.  On 12/28 patient underwent washout of sacral wound, application of microcellular dermal matrix and wound vac placement.  VAC placed by surgical team today.  Seen by ID, to continue 32 day course of PO Augmentin for sacral wound.    Tolerating PO when fed, VAC in place.  Voiding and having formed BM's, hemodynamically stable for discharge.  This is only a summary of events during admission, please refer to full medical record for details of hospital stay. 87 year old female with PMH dementia, glaucoma, colon cancer s/p remote resection brought in by EMS to the ED by her daughter for a decubitus ulcer and increasing weakness on 12/24.  Patient was also short of breath per EMS.  Per patient's daughter she had a small ulcer that began 6 months ago, which they were doing local wound care for, however she reports she has not seen the ulcer for 2 weeks, when she looked at it on day of presentation she saw that it was large and very deep.  As per daughter, patient had been declining in health for the past year, last seen by PMD 18 months prior. Patient had ambulating with poor balance, became incontinent over 6 months ago. She developed and a sacral ulcer while wearing a diaper and becoming less mobile. She eventually became more sedentary, bedbound about 2 weeks ago prior to admission. Sacral ulcer soon developed after that and was noticed by daughter as the size of a coin. Daughter noticed the ulcer then became very large. There was no reported fever, chills no chest pain no nausea vomiting.    In the ED, patient was evaluated for respiratory distress and lower back tenderness. She was initiated on sepsis protocol, IVF resuscitation, started on IV antibiotics, blood cultures and lactic acid levels were obtained. A CT angiogram of chest and CT of abdomen/ pelvis were performed.  where showed Gluteal Abcess, b/l PE, Fem DVT and sacral decub.  Found to have DVT with PE, started on full anticoagulation.     Seen by surgery, status post excisional debridement and pulse irrigation of sacral ulcer on 12/26.  On 12/28 patient underwent washout of sacral wound, application of microcellular dermal matrix and wound vac placement.  VAC placed by surgical team today.  Seen by ID, to continue 32 day course of PO Augmentin for sacral wound.    Tolerating PO when fed, VAC in place.  Voiding and having formed BM's, hemodynamically stable for discharge.  This is only a summary of events during admission, please refer to full medical record for details of hospital stay. 87 year old female with PMH dementia, glaucoma, colon cancer s/p remote resection brought in by EMS to the ED by her daughter for a decubitus ulcer and increasing weakness on 12/24.  Patient was also short of breath per EMS.  Per patient's daughter she had a small ulcer that began 6 months ago, which they were doing local wound care for, however she reports she has not seen the ulcer for 2 weeks, when she looked at it on day of presentation she saw that it was large and very deep.  As per daughter, patient had been declining in health for the past year, last seen by PMD 18 months prior. Patient had ambulating with poor balance, became incontinent over 6 months ago. She developed and a sacral ulcer while wearing a diaper and becoming less mobile. She eventually became more sedentary, bedbound about 2 weeks ago prior to admission. Sacral ulcer soon developed after that and was noticed by daughter as the size of a coin. Daughter noticed the ulcer then became very large. There was no reported fever, chills no chest pain no nausea vomiting.    In the ED, patient was evaluated for respiratory distress and lower back tenderness. She was initiated on sepsis protocol, IVF resuscitation, started on IV antibiotics, blood cultures and lactic acid levels were obtained. A CT angiogram of chest and CT of abdomen/ pelvis were performed.  where showed Gluteal Abcess, b/l PE, Fem DVT and sacral decub.  Found to have DVT with PE, started on full anticoagulation.     Seen by surgery, status post excisional debridement and pulse irrigation of sacral ulcer on 12/26.  On 12/28 patient underwent washout of sacral wound, application of microcellular dermal matrix and wound vac placement.  VAC placed by surgical team today.  Seen by ID, to complete 32 day course of PO Augmentin for sacral wound.    Tolerating PO when fed, VAC in place.  Voiding and having formed BM's, hemodynamically stable for discharge.  This is only a summary of events during admission, please refer to full medical record for details of hospital stay. 87 year old female with PMH dementia, glaucoma, colon cancer s/p remote resection brought in by EMS to the ED by her daughter for a decubitus ulcer and increasing weakness on 12/24.  Patient was also short of breath per EMS.  Per patient's daughter she had a small ulcer that began 6 months ago, which they were doing local wound care for, however she reports she has not seen the ulcer for 2 weeks, when she looked at it on day of presentation she saw that it was large and very deep.  As per daughter, patient had been declining in health for the past year, last seen by PMD 18 months prior. Patient had ambulating with poor balance, became incontinent over 6 months ago. She developed and a sacral ulcer while wearing a diaper and becoming less mobile. She eventually became more sedentary, bedbound about 2 weeks ago prior to admission. Sacral ulcer soon developed after that and was noticed by daughter as the size of a coin. Daughter noticed the ulcer then became very large. There was no reported fever, chills no chest pain no nausea vomiting.    In the ED, patient was evaluated for respiratory distress and lower back tenderness. She was initiated on sepsis protocol, IVF resuscitation, started on IV antibiotics, blood cultures and lactic acid levels were obtained. A CT angiogram of chest and CT of abdomen/ pelvis were performed.  where showed Gluteal Abcess, b/l PE, Fem DVT and sacral decub.  Found to have DVT with PE, started on full anticoagulation.     Seen by surgery, status post excisional debridement and pulse irrigation of sacral ulcer on 12/26.  On 12/28 patient underwent washout of sacral wound, application of microcellular dermal matrix and wound vac placement.  VAC placed by surgical team today.  Seen by ID, to complete 32 day course of PO Augmentin for sacral wound.    Tolerating PO when fed, VAC in place.  Voiding and having formed BM's, hemodynamically stable for discharge.  This is only a summary of events during admission, please refer to full medical record for details of hospital stay.    For full account of hospital course please refer to actual medical records. As this is a brief summery.

## 2023-01-04 NOTE — CHART NOTE - NSCHARTNOTEFT_GEN_A_CORE
Patient requires use of hospital bed due to the fact that patient requires frequent changes in body position or an immediate need for a change in position.  Patient's condition requires positioning of the body to alleviate pain, prevent contractures and avoid respiratory infections not feasible in an ordinary bed.    Patient requires use of gel flotation overlay mattress due the fact that the patient has limited mobility, she cannot independently make changes in body position significant enough to alleviate pressure.    Patient requires use of transport wheelchair as she is unable to self-propel in a standard wheelchair and to assist in ADL's in the home.  Patient has a caregiver in the home to operate transport wheelchair.

## 2023-01-05 VITALS
OXYGEN SATURATION: 97 % | RESPIRATION RATE: 17 BRPM | WEIGHT: 107.37 LBS | SYSTOLIC BLOOD PRESSURE: 140 MMHG | DIASTOLIC BLOOD PRESSURE: 94 MMHG | TEMPERATURE: 98 F | HEART RATE: 76 BPM

## 2023-01-05 LAB
HCT VFR BLD CALC: 28.6 % — LOW (ref 34.5–45)
HGB BLD-MCNC: 9.1 G/DL — LOW (ref 11.5–15.5)
MCHC RBC-ENTMCNC: 29.8 PG — SIGNIFICANT CHANGE UP (ref 27–34)
MCHC RBC-ENTMCNC: 31.8 GM/DL — LOW (ref 32–36)
MCV RBC AUTO: 93.8 FL — SIGNIFICANT CHANGE UP (ref 80–100)
NRBC # BLD: 0 /100 WBCS — SIGNIFICANT CHANGE UP (ref 0–0)
PLATELET # BLD AUTO: 326 K/UL — SIGNIFICANT CHANGE UP (ref 150–400)
RBC # BLD: 3.05 M/UL — LOW (ref 3.8–5.2)
RBC # FLD: 18.6 % — HIGH (ref 10.3–14.5)
WBC # BLD: 7.82 K/UL — SIGNIFICANT CHANGE UP (ref 3.8–10.5)
WBC # FLD AUTO: 7.82 K/UL — SIGNIFICANT CHANGE UP (ref 3.8–10.5)

## 2023-01-05 PROCEDURE — 80048 BASIC METABOLIC PNL TOTAL CA: CPT

## 2023-01-05 PROCEDURE — 84484 ASSAY OF TROPONIN QUANT: CPT

## 2023-01-05 PROCEDURE — 96367 TX/PROPH/DG ADDL SEQ IV INF: CPT

## 2023-01-05 PROCEDURE — 82553 CREATINE MB FRACTION: CPT

## 2023-01-05 PROCEDURE — 80061 LIPID PANEL: CPT

## 2023-01-05 PROCEDURE — 84443 ASSAY THYROID STIM HORMONE: CPT

## 2023-01-05 PROCEDURE — 85027 COMPLETE CBC AUTOMATED: CPT

## 2023-01-05 PROCEDURE — 87186 SC STD MICRODIL/AGAR DIL: CPT

## 2023-01-05 PROCEDURE — 85379 FIBRIN DEGRADATION QUANT: CPT

## 2023-01-05 PROCEDURE — 87070 CULTURE OTHR SPECIMN AEROBIC: CPT

## 2023-01-05 PROCEDURE — 88311 DECALCIFY TISSUE: CPT

## 2023-01-05 PROCEDURE — 83605 ASSAY OF LACTIC ACID: CPT

## 2023-01-05 PROCEDURE — 86923 COMPATIBILITY TEST ELECTRIC: CPT

## 2023-01-05 PROCEDURE — 36430 TRANSFUSION BLD/BLD COMPNT: CPT

## 2023-01-05 PROCEDURE — 87635 SARS-COV-2 COVID-19 AMP PRB: CPT

## 2023-01-05 PROCEDURE — 86901 BLOOD TYPING SEROLOGIC RH(D): CPT

## 2023-01-05 PROCEDURE — 83880 ASSAY OF NATRIURETIC PEPTIDE: CPT

## 2023-01-05 PROCEDURE — 87040 BLOOD CULTURE FOR BACTERIA: CPT

## 2023-01-05 PROCEDURE — 93306 TTE W/DOPPLER COMPLETE: CPT

## 2023-01-05 PROCEDURE — 71045 X-RAY EXAM CHEST 1 VIEW: CPT

## 2023-01-05 PROCEDURE — 88305 TISSUE EXAM BY PATHOLOGIST: CPT

## 2023-01-05 PROCEDURE — 83735 ASSAY OF MAGNESIUM: CPT

## 2023-01-05 PROCEDURE — 86850 RBC ANTIBODY SCREEN: CPT

## 2023-01-05 PROCEDURE — 87150 DNA/RNA AMPLIFIED PROBE: CPT

## 2023-01-05 PROCEDURE — 36600 WITHDRAWAL OF ARTERIAL BLOOD: CPT

## 2023-01-05 PROCEDURE — 82550 ASSAY OF CK (CPK): CPT

## 2023-01-05 PROCEDURE — 70450 CT HEAD/BRAIN W/O DYE: CPT

## 2023-01-05 PROCEDURE — 71260 CT THORAX DX C+: CPT | Mod: MG

## 2023-01-05 PROCEDURE — 36415 COLL VENOUS BLD VENIPUNCTURE: CPT

## 2023-01-05 PROCEDURE — P9016: CPT

## 2023-01-05 PROCEDURE — 96375 TX/PRO/DX INJ NEW DRUG ADDON: CPT

## 2023-01-05 PROCEDURE — 74177 CT ABD & PELVIS W/CONTRAST: CPT | Mod: MG

## 2023-01-05 PROCEDURE — 85730 THROMBOPLASTIN TIME PARTIAL: CPT

## 2023-01-05 PROCEDURE — 96365 THER/PROPH/DIAG IV INF INIT: CPT

## 2023-01-05 PROCEDURE — 84100 ASSAY OF PHOSPHORUS: CPT

## 2023-01-05 PROCEDURE — G1004: CPT

## 2023-01-05 PROCEDURE — 85610 PROTHROMBIN TIME: CPT

## 2023-01-05 PROCEDURE — 87086 URINE CULTURE/COLONY COUNT: CPT

## 2023-01-05 PROCEDURE — 99291 CRITICAL CARE FIRST HOUR: CPT | Mod: 25

## 2023-01-05 PROCEDURE — 0225U NFCT DS DNA&RNA 21 SARSCOV2: CPT

## 2023-01-05 PROCEDURE — 99261: CPT

## 2023-01-05 PROCEDURE — C1889: CPT

## 2023-01-05 PROCEDURE — 80053 COMPREHEN METABOLIC PANEL: CPT

## 2023-01-05 PROCEDURE — 86900 BLOOD TYPING SEROLOGIC ABO: CPT

## 2023-01-05 PROCEDURE — 87075 CULTR BACTERIA EXCEPT BLOOD: CPT

## 2023-01-05 PROCEDURE — 82803 BLOOD GASES ANY COMBINATION: CPT

## 2023-01-05 PROCEDURE — 96366 THER/PROPH/DIAG IV INF ADDON: CPT

## 2023-01-05 PROCEDURE — 85025 COMPLETE CBC W/AUTO DIFF WBC: CPT

## 2023-01-05 PROCEDURE — 93005 ELECTROCARDIOGRAM TRACING: CPT

## 2023-01-05 PROCEDURE — 81001 URINALYSIS AUTO W/SCOPE: CPT

## 2023-01-05 PROCEDURE — 87077 CULTURE AEROBIC IDENTIFY: CPT

## 2023-01-05 RX ADMIN — MIDODRINE HYDROCHLORIDE 5 MILLIGRAM(S): 2.5 TABLET ORAL at 05:50

## 2023-01-05 RX ADMIN — DORZOLAMIDE HYDROCHLORIDE TIMOLOL MALEATE 1 DROP(S): 20; 5 SOLUTION/ DROPS OPHTHALMIC at 05:51

## 2023-01-05 RX ADMIN — PANTOPRAZOLE SODIUM 40 MILLIGRAM(S): 20 TABLET, DELAYED RELEASE ORAL at 05:50

## 2023-01-05 RX ADMIN — APIXABAN 5 MILLIGRAM(S): 2.5 TABLET, FILM COATED ORAL at 05:50

## 2023-01-05 RX ADMIN — POLYETHYLENE GLYCOL 3350 17 GRAM(S): 17 POWDER, FOR SOLUTION ORAL at 05:51

## 2023-01-05 RX ADMIN — BRIMONIDINE TARTRATE 1 DROP(S): 2 SOLUTION/ DROPS OPHTHALMIC at 05:51

## 2023-01-05 NOTE — PROGRESS NOTE ADULT - REASON FOR ADMISSION
decubitus ulcer
Sacral decubiti
Decubitus ulcer

## 2023-01-05 NOTE — PROGRESS NOTE ADULT - ASSESSMENT
Assessment  1. Stage 4 decub with abscess and bacteremia - with wound vac  2. DVT/PE   3. Dementia      Plan  as per ID finish course of antibiotics x 4 weeks oral  a/c for life  wound care as per surgery  diet as tolerated  PT  d/c to [facility] today

## 2023-01-05 NOTE — CHART NOTE - NSCHARTNOTEFT_GEN_A_CORE
Patient was being discharged to [facility] .   Transporters waiting for discharge paperwork to go to [facility]   Wound vac removed from sacral area and wet to dry saline dressings placed.   Wound vac to be reapplied at [facility]

## 2023-01-05 NOTE — PROGRESS NOTE ADULT - NUTRITIONAL ASSESSMENT
This patient has been assessed with a concern for Malnutrition and has been determined to have a diagnosis/diagnoses of Severe protein-calorie malnutrition and Underweight (BMI < 19).    This patient is being managed with:   Diet Regular-  Supplement Feeding Modality:  Oral  Ensure Plus High Protein Cans or Servings Per Day:  1       Frequency:  Two Times a day  Entered: Dec 28 2022  1:10PM    
This patient has been assessed with a concern for Malnutrition and has been determined to have a diagnosis/diagnoses of Severe protein-calorie malnutrition and Underweight (BMI < 19).    This patient is being managed with:   Diet NPO after Midnight-     NPO Start Date: 27-Dec-2022   NPO Start Time: 23:59  Entered: Dec 27 2022 10:12AM    Diet Regular-  Supplement Feeding Modality:  Oral  Ensure Plus High Protein Cans or Servings Per Day:  1       Frequency:  Two Times a day  Entered: Dec 26 2022 11:03AM    
This patient has been assessed with a concern for Malnutrition and has been determined to have a diagnosis/diagnoses of Severe protein-calorie malnutrition and Underweight (BMI < 19).    This patient is being managed with:   Diet Regular-  Supplement Feeding Modality:  Oral  Ensure Plus High Protein Cans or Servings Per Day:  1       Frequency:  Two Times a day  Entered: Dec 28 2022  1:10PM    
This patient has been assessed with a concern for Malnutrition and has been determined to have a diagnosis/diagnoses of Severe protein-calorie malnutrition and Underweight (BMI < 19).    This patient is being managed with:   Diet Regular-  Supplement Feeding Modality:  Oral  Ensure Plus High Protein Cans or Servings Per Day:  1       Frequency:  Two Times a day  Entered: Dec 28 2022  1:10PM      This patient has been assessed with a concern for Malnutrition and has been determined to have a diagnosis/diagnoses of Severe protein-calorie malnutrition and Underweight (BMI < 19).    This patient is being managed with:   Diet Regular-  Supplement Feeding Modality:  Oral  Ensure Plus High Protein Cans or Servings Per Day:  1       Frequency:  Two Times a day  Entered: Dec 28 2022  1:10PM    
This patient has been assessed with a concern for Malnutrition and has been determined to have a diagnosis/diagnoses of Severe protein-calorie malnutrition and Underweight (BMI < 19).    This patient is being managed with:   Diet NPO after Midnight-     NPO Start Date: 27-Dec-2022   NPO Start Time: 23:59  Entered: Dec 27 2022 10:12AM    Diet Regular-  Supplement Feeding Modality:  Oral  Ensure Plus High Protein Cans or Servings Per Day:  1       Frequency:  Two Times a day  Entered: Dec 26 2022 11:03AM    
This patient has been assessed with a concern for Malnutrition and has been determined to have a diagnosis/diagnoses of Severe protein-calorie malnutrition and Underweight (BMI < 19).    This patient is being managed with:   Diet Regular-  Supplement Feeding Modality:  Oral  Ensure Plus High Protein Cans or Servings Per Day:  1       Frequency:  Two Times a day  Entered: Dec 28 2022  1:10PM    

## 2023-01-05 NOTE — PROGRESS NOTE ADULT - SUBJECTIVE AND OBJECTIVE BOX
Follow-up Critical Care Progress Note  Chief Complaint : Other pulmonary embolism without acute cor pulmonale        pt on room air  comfortable  vitals stable  confused unable to provide history or ros        Allergies :No Known Allergies      PAST MEDICAL & SURGICAL HISTORY:  Colon cancer    Dementia    S/P colon resection        Medications:  MEDICATIONS  (STANDING):  amoxicillin  875 milliGRAM(s)/clavulanate 1 Tablet(s) Oral every 24 hours  apixaban 5 milliGRAM(s) Oral two times a day  aspirin  chewable 81 milliGRAM(s) Oral daily  brimonidine 0.2% Ophthalmic Solution 1 Drop(s) Right EYE every 12 hours  dorzolamide 2%/timolol 0.5% Ophthalmic Solution 1 Drop(s) Right EYE every 12 hours  midodrine 5 milliGRAM(s) Oral every 8 hours  pantoprazole    Tablet 40 milliGRAM(s) Oral before breakfast  polyethylene glycol 3350 17 Gram(s) Oral two times a day  senna 2 Tablet(s) Oral at bedtime    MEDICATIONS  (PRN):  acetaminophen     Tablet .. 650 milliGRAM(s) Oral every 6 hours PRN Temp greater or equal to 38C (100.4F), Mild Pain (1 - 3)      Antibiotics History  amoxicillin  875 milliGRAM(s)/clavulanate 1 Tablet(s) Oral every 24 hours, 01-02-23 @ 13:04  clindamycin IVPB    , 12-24-22 @ 21:17  clindamycin IVPB 900 milliGRAM(s) IV Intermittent once, 12-24-22 @ 20:24  clindamycin IVPB 900 milliGRAM(s) IV Intermittent every 8 hours, 12-25-22 @ 06:00  levoFLOXacin IVPB 750 milliGRAM(s) IV Intermittent once, 12-24-22 @ 11:19, Stop order after: 1 Doses  piperacillin/tazobactam IVPB. 3.375 Gram(s) IV Intermittent once, 12-24-22 @ 16:17, Stop order after: 1 Doses  piperacillin/tazobactam IVPB.- 3.375 Gram(s) IV Intermittent once, 12-24-22 @ 20:00  piperacillin/tazobactam IVPB.- 3.375 Gram(s) IV Intermittent once, 12-24-22 @ 22:29, Stop order after: 1 Doses  piperacillin/tazobactam IVPB.. 3.375 Gram(s) IV Intermittent every 8 hours, 12-24-22 @ 22:47, Stop order after: 7 Days  piperacillin/tazobactam IVPB.. 3.375 Gram(s) IV Intermittent every 8 hours, 12-25-22 @ 06:00, Stop order after: 8 Days  piperacillin/tazobactam IVPB.. 3.375 Gram(s) IV Intermittent every 8 hours, 12-26-22 @ 11:03, Stop order after: 7 Days  piperacillin/tazobactam IVPB.. 3.375 Gram(s) IV Intermittent every 8 hours, 12-28-22 @ 13:10, Stop order after: 7 Days  vancomycin  IVPB 1000 milliGRAM(s) IV Intermittent every 12 hours, 12-24-22 @ 16:16, Stop order after: 42 Days  vancomycin  IVPB 750 milliGRAM(s) IV Intermittent every 12 hours, 12-25-22 @ 10:05, Stop order after: 42 Days  vancomycin  IVPB. 1000 milliGRAM(s) IV Intermittent once, 12-24-22 @ 11:19, Stop order after: 1 Doses      Heme Medications   apixaban 5 milliGRAM(s) Oral two times a day, 12-30-22 @ 00:00  aspirin  chewable 81 milliGRAM(s) Oral daily, 12-28-22 @ 13:10      GI Medications  pantoprazole    Tablet 40 milliGRAM(s) Oral before breakfast, 12-30-22 @ 00:00, Routine  polyethylene glycol 3350 17 Gram(s) Oral two times a day, 12-28-22 @ 13:10, Routine  senna 2 Tablet(s) Oral at bedtime, 12-28-22 @ 13:10, Routine      COVID  01-04-23 @ 15:00  COVID -   NotDetec  12-24-22 @ 11:47  COVID -   NotDetec      COVID Biomarkers    12-25-22 @ 06:30 ESR --  ---  CRP --  ---  DDimer  3142<H>   ---   LDH --   ---   Ferritin --         Trend BNP  12-25-22 @ 06:28   -  6427<H>  12-24-22 @ 11:34   -  8237<H>    Procalcitonin Trend    WBC Trend  01-05-23 @ 06:30   -  7.82  01-04-23 @ 07:30   -  7.99  01-03-23 @ 07:10   -  8.30    H/H Trend  01-05-23 @ 06:30   -   9.1<L>/ 28.6<L>  01-04-23 @ 07:30   -   8.5<L>/ 27.7<L>  01-03-23 @ 07:10   -   9.5<L>/ 30.0<L>  01-02-23 @ 06:45   -   8.8<L>/ 27.5<L>  01-01-23 @ 06:50   -   8.3<L>/ 25.5<L>  12-31-22 @ 07:20   -   8.2<L>/ 25.3<L>       Platelet Trend  01-05-23 @ 06:30   -  326  01-04-23 @ 07:30   -  281  01-03-23 @ 07:10   -  250    Trend Sodium  01-03-23 @ 07:10   -  139    Trend Potassium  01-03-23 @ 07:10   -  3.6    Trend Bun/Cr  01-03-23 @ 07:10  BUN/CR -  21 / 0.92    Lactic Acid Trend  12-25-22 @ 13:10   -   1.9  12-25-22 @ 10:18   -   2.8<H>  12-25-22 @ 06:30   -   2.4<H>  12-24-22 @ 23:50   -   2.7<H>    ABG Trend  12-24-22 @ 14:45   - 7.49<H>/27<L>/289<H>/99.2<H>    Trend AST/ALT/ALK Phos/Bili  12-25-22 @ 06:28   41<H>/33/142<H>/0.5  12-24-22 @ 16:48   71<H>/55<H>/137<H>/0.6  12-24-22 @ 11:34   63<H>/59<H>/149<H>/0.7       Albumin Trend  12-25-22 @ 06:28   -   1.3<L>  12-24-22 @ 16:48   -   1.2<L>  12-24-22 @ 11:34   -   1.5<L>      PTT - PT - INR Trend  12-29-22 @ 12:15   -   40.9<H> - -- - --  12-29-22 @ 08:14   -   >200.0<HH> - -- - --  12-28-22 @ 23:15   -   47.0<H> - -- - --  12-28-22 @ 14:15   -   32.1 - -- - --  12-28-22 @ 04:00   -   64.0<H> - -- - --  12-27-22 @ 19:59   -   175.5<HH> - -- - --    Glucose Trend  01-03-23 @ 07:10   -  -- 97 --        LABS:                        9.1    7.82  )-----------( 326      ( 05 Jan 2023 06:30 )             28.6        CULTURES: (if applicable)    Culture - Tissue with Gram Stain (collected 12-26-22 @ 11:47)  Source: Bone SACRAL #4  Gram Stain (12-26-22 @ 20:10):    No polymorphonuclear leukocytes seen per low power field    Numerous Gram positive cocci in pairs seen per oil power field    Numerous Gram Variable Rods seen per oil power field  Final Report (12-28-22 @ 13:53):    Moderate Escherichia coli    Few Bacteroides fragilis "Susceptibilities not performed"    Few Clostridium ramosum "Susceptibilities not performed"    Moderate Parvimonas micra "Susceptibilities not performed"  Organism: Escherichia coli (12-28-22 @ 13:53)  Organism: Escherichia coli (12-28-22 @ 13:53)      -  Amikacin: S <=16      -  Amoxicillin/Clavulanic Acid: S <=8/4      -  Ampicillin: S <=8 These ampicillin results predict results for amoxicillin      -  Ampicillin/Sulbactam: S <=4/2 Enterobacter, Klebsiella aerogenes, Citrobacter, and Serratia may develop resistance during prolonged therapy (3-4 days)      -  Aztreonam: S <=4      -  Cefazolin: S <=2 Enterobacter, Klebsiella aerogenes, Citrobacter, and Serratia may develop resistance during prolonged therapy (3-4 days)      -  Cefepime: S <=2      -  Cefoxitin: S <=8      -  Ceftriaxone: S <=1 Enterobacter, Klebsiella aerogenes, Citrobacter, and Serratia may develop resistance during prolonged therapy      -  Ciprofloxacin: R >2      -  Ertapenem: S <=0.5      -  Gentamicin: S <=2      -  Imipenem: S <=1      -  Levofloxacin: R >4      -  Meropenem: S <=1      -  Piperacillin/Tazobactam: S <=8      -  Tobramycin: S <=2      -  Trimethoprim/Sulfamethoxazole: S <=0.5/9.5      Method Type: JUANITO    Culture - Tissue with Gram Stain (collected 12-26-22 @ 11:45)  Source: Bone SACRAL #3  Gram Stain (12-26-22 @ 20:10):    No polymorphonuclear leukocytes seen per low power field    Rare Gram positive cocci in pairs seen per oil power field    Rare Gram Variable Rods seen per oil power field  Final Report (12-28-22 @ 13:13):    Rare Escherichia coli    Mixed Anaerobic Jenise including    Rare Bacteroides fragilis "Susceptibilities not performed"    Few Clostridium ramosum "Susceptibilities not performed"    Moderate Parvimonas micra "Susceptibilities not performed"    Few Anaerobic Gram Positive Cocci Most closely resembling    Peptostreptococcus species "Susceptibilities not performed"  Organism: Escherichia coli (12-28-22 @ 13:13)  Organism: Escherichia coli (12-28-22 @ 13:13)      -  Amikacin: S <=16      -  Amoxicillin/Clavulanic Acid: S <=8/4      -  Ampicillin: S <=8 These ampicillin results predict results for amoxicillin      -  Ampicillin/Sulbactam: S <=4/2 Enterobacter, Klebsiella aerogenes, Citrobacter, and Serratia may develop resistance during prolonged therapy (3-4 days)      -  Aztreonam: S <=4      -  Cefazolin: S <=2 Enterobacter, Klebsiella aerogenes, Citrobacter, and Serratia may develop resistance during prolonged therapy (3-4 days)      -  Cefepime: S <=2      -  Cefoxitin: S <=8      -  Ceftriaxone: S <=1 Enterobacter, Klebsiella aerogenes, Citrobacter, and Serratia may develop resistance during prolonged therapy      -  Ciprofloxacin: R >2      -  Ertapenem: S <=0.5      -  Gentamicin: S <=2      -  Imipenem: S <=1      -  Levofloxacin: R >4      -  Meropenem: S <=1      -  Piperacillin/Tazobactam: S <=8      -  Tobramycin: S <=2      -  Trimethoprim/Sulfamethoxazole: S <=0.5/9.5      Method Type: JUANITO    Culture - Tissue with Gram Stain (collected 12-26-22 @ 11:42)  Source: Bone SACRAL #2  Gram Stain (12-26-22 @ 20:11):    No polymorphonuclear leukocytes seen per low power field    Moderate Gram positive cocci in pairs seen per oil power field    Moderate Gram Variable Rods seen per oil power field  Final Report (12-28-22 @ 13:09):    Few Escherichia coli    Few Mixed Anaerobic Jenise including    Few Bacteroides fragilis "Susceptibilities not performed"    Few Clostridium ramosum "Susceptibilities not performed"    Few Anaerobic Gram Positive Cocci Most closely resembling    Peptostreptococcus species "Susceptibilities not performed"  Organism: Escherichia coli (12-28-22 @ 13:09)  Organism: Escherichia coli (12-28-22 @ 13:09)      -  Amikacin: S <=16      -  Amoxicillin/Clavulanic Acid: S <=8/4      -  Ampicillin: S <=8 These ampicillin results predict results for amoxicillin      -  Ampicillin/Sulbactam: S <=4/2 Enterobacter, Klebsiella aerogenes, Citrobacter, and Serratia may develop resistance during prolonged therapy (3-4 days)      -  Aztreonam: S <=4      -  Cefazolin: S <=2 Enterobacter, Klebsiella aerogenes, Citrobacter, and Serratia may develop resistance during prolonged therapy (3-4 days)      -  Cefepime: S <=2      -  Cefoxitin: S <=8      -  Ceftriaxone: S <=1 Enterobacter, Klebsiella aerogenes, Citrobacter, and Serratia may develop resistance during prolonged therapy      -  Ciprofloxacin: R >2      -  Ertapenem: S <=0.5      -  Gentamicin: S <=2      -  Imipenem: S <=1      -  Levofloxacin: R >4      -  Meropenem: S <=1      -  Piperacillin/Tazobactam: S <=8      -  Tobramycin: S <=2      -  Trimethoprim/Sulfamethoxazole: S <=0.5/9.5      Method Type: JUANITO    Culture - Tissue with Gram Stain (collected 12-26-22 @ 11:34)  Source: Bone sacral bone  Gram Stain (12-26-22 @ 20:11):    No polymorphonuclear leukocytes seen per low power field    Few Gram positive cocci in pairs seen per oil power field    Few Gram Variable Rods seen per oil power field  Final Report (12-30-22 @ 13:26):    Growth in fluid media only Escherichia coli    Rare Streptococcus anginosus    Few Clostridium ramosum "Susceptibilities not performed"    Moderate Solobacterium moorei "Susceptibilities not performed"  Organism: Escherichia coli  Streptococcus anginosus (12-30-22 @ 13:26)  Organism: Streptococcus anginosus (12-30-22 @ 13:26)      -  Ceftriaxone: S <=0.25      -  Clindamycin: R >0.5      -  Erythromycin: R >0.5      -  Levofloxacin: S 0.5      -  Penicillin: S <=0.03      -  Vancomycin: S 1      Method Type: JUANITO  Organism: Escherichia coli (12-30-22 @ 13:26)      -  Amikacin: S <=16      -  Amoxicillin/Clavulanic Acid: S <=8/4      -  Ampicillin: S <=8 These ampicillin results predict results for amoxicillin      -  Ampicillin/Sulbactam: S <=4/2 Enterobacter, Klebsiella aerogenes, Citrobacter, and Serratia may develop resistance during prolonged therapy (3-4 days)      -  Aztreonam: S <=4      -  Cefazolin: S <=2 Enterobacter, Klebsiella aerogenes, Citrobacter, and Serratia may develop resistance during prolonged therapy (3-4 days)      -  Cefepime: S <=2      -  Cefoxitin: S <=8      -  Ceftriaxone: S <=1 Enterobacter, Klebsiella aerogenes, Citrobacter, and Serratia may develop resistance during prolonged therapy      -  Ciprofloxacin: R >2      -  Ertapenem: S <=0.5      -  Gentamicin: S <=2      -  Imipenem: S <=1      -  Levofloxacin: R >4      -  Meropenem: S <=1      -  Piperacillin/Tazobactam: S <=8      -  Tobramycin: S <=2      -  Trimethoprim/Sulfamethoxazole: S <=0.5/9.5      Method Type: JUANITO    Culture - Urine (collected 12-24-22 @ 14:42)  Source: Clean Catch Clean Catch (Midstream)  Final Report (12-25-22 @ 23:27):    No growth    Culture - Blood (collected 12-24-22 @ 11:34)  Source: .Blood Blood-Peripheral  Gram Stain (12-25-22 @ 18:36):    Growth in anaerobic bottle: Gram positive cocci in pairs and Gram    Negative Rods  Final Report (12-31-22 @ 17:51):    Growth in anaerobic bottle: Bacteroides fragilis "Susceptibilities not    performed"    Growth in anaerobic bottle: Peptoniphilus harei group "Susceptibilities    not performed"    ***Blood Panel PCR results on this specimen are available    approximately 3 hours after the Gram stain result.***    Gram stain, PCR, and/or culture results may not always    correspond due to difference in methodologies.    ************************************************************    This PCR assay was performed by multiplex PCR.This    Assay tests for 66 bacterial and resistance gene targets.    Please refer to the [facility] test directory    at [email or web address] for details.  Organism: Blood Culture PCR (12-31-22 @ 17:51)  Organism: Blood Culture PCR (12-31-22 @ 17:51)      -  Bacteroides fragilis: Detec      Method Type: PCR    Culture - Blood (collected 12-24-22 @ 11:34)  Source: .Blood Blood-Peripheral  Gram Stain (12-26-22 @ 12:25):    Upon re-evaluation of gram stain:    Growth in aerobic and anaerobic bottles: Gram Positive Cocci in Pairs and    Chains    Growth in anaerobic bottle: Gram Negative Rods    upon repeat smear after prolonged incubation    Previously reported as:    Growth in aerobic and anaerobic bottles: Gram Positive Cocci in Pairs and    Chains  Final Report (12-26-22 @ 11:30):    Growth in aerobic and anaerobic bottles: Streptococcus anginosus Alpha    hemolytic strep    (not Strep. pneumoniae or Enterococcus)    Single set isolate, possible contaminant. Contact    Microbiology if susceptibility testing clinically    indicated.    Growth in anaerobic bottle: Bacteroides fragilis "Susceptibilities not    performed"    ***Blood Panel PCR results on this specimen are available    approximately 3 hours after the Gram stain result.***    Gram stain, PCR, and/or culture results may not always    correspond due to difference in methodologies.    ************************************************************    This PCR assay was performed by multiplex PCR. This    Assay tests for 66 bacterial and resistance gene targets.    Please refer to the [facility] test directory    at [email or web address] for details.  Organism: Blood Culture PCR (12-26-22 @ 12:25)  Organism: Blood Culture PCR (12-26-22 @ 12:25)      -  Bacteroides fragilis: Detec      -  Streptococcus sp. (Not Grp A, B or S pneumoniae): Detec      Method Type: PCR      Rapid RVP Result: NotDetec (12-24-22 @ 11:47)              VITALS:  T(C): 36.7 (01-05-23 @ 06:10), Max: 36.7 (01-05-23 @ 06:10)  T(F): 98 (01-05-23 @ 06:10), Max: 98 (01-05-23 @ 06:10)  HR: 76 (01-05-23 @ 06:10) (76 - 76)  BP: 140/94 (01-05-23 @ 06:10) (140/94 - 140/94)  BP(mean): --  ABP: --  ABP(mean): --  RR: 17 (01-05-23 @ 06:10) (17 - 17)  SpO2: 97% (01-05-23 @ 06:10) (97% - 97%)  CVP(mm Hg): --  CVP(cm H2O): --    Ins and Outs     01-04-23 @ 07:01  -  01-05-23 @ 07:00  --------------------------------------------------------  IN: 340 mL / OUT: 100 mL / NET: 240 mL                I&O's Detail    04 Jan 2023 07:01  -  05 Jan 2023 07:00  --------------------------------------------------------  IN:    Oral Fluid: 340 mL  Total IN: 340 mL    OUT:    Voided (mL): 100 mL  Total OUT: 100 mL    Total NET: 240 mL          Physical Examination:  GENERAL:               Alert,  No acute distress.    HEENT:                   No JVD, Moist MM  PULM:                     Bilateral air entry, Clear to auscultation bilaterally, no significant sputum production, No Rales, No Rhonchi, No Wheezing  CVS:                         S1, S2,  No Murmur  ABD:                        Soft, nondistended, nontender, normoactive bowel sounds,    NEURO:                  Alert, Confused , interactive, nonfocal, follows commands  PSYC:                      Calm, no Insight.

## 2023-01-05 NOTE — PROGRESS NOTE ADULT - PROVIDER SPECIALTY LIST ADULT
Critical Care
Critical Care
Palliative Care
Critical Care
Critical Care
Infectious Disease
Palliative Care
Surgery
Surgery
Critical Care
Infectious Disease
Surgery
Surgery
Critical Care
Critical Care
Infectious Disease
Surgery
Critical Care
Critical Care

## 2023-01-21 ENCOUNTER — INPATIENT (INPATIENT)
Facility: HOSPITAL | Age: 88
LOS: 3 days | Discharge: NURSING, REHAB OR LONG-TERM CARE | DRG: 377 | End: 2023-01-25
Attending: INTERNAL MEDICINE | Admitting: HOSPITALIST
Payer: MEDICARE

## 2023-01-21 VITALS
SYSTOLIC BLOOD PRESSURE: 126 MMHG | WEIGHT: 106.04 LBS | TEMPERATURE: 98 F | DIASTOLIC BLOOD PRESSURE: 78 MMHG | OXYGEN SATURATION: 96 % | HEART RATE: 70 BPM

## 2023-01-21 DIAGNOSIS — D62 ACUTE POSTHEMORRHAGIC ANEMIA: ICD-10-CM

## 2023-01-21 DIAGNOSIS — Z90.49 ACQUIRED ABSENCE OF OTHER SPECIFIED PARTS OF DIGESTIVE TRACT: Chronic | ICD-10-CM

## 2023-01-21 DIAGNOSIS — R09.89 OTHER SPECIFIED SYMPTOMS AND SIGNS INVOLVING THE CIRCULATORY AND RESPIRATORY SYSTEMS: ICD-10-CM

## 2023-01-21 DIAGNOSIS — K92.1 MELENA: ICD-10-CM

## 2023-01-21 PROBLEM — C18.9 MALIGNANT NEOPLASM OF COLON, UNSPECIFIED: Chronic | Status: ACTIVE | Noted: 2022-12-24

## 2023-01-21 PROBLEM — F03.90 UNSPECIFIED DEMENTIA, UNSPECIFIED SEVERITY, WITHOUT BEHAVIORAL DISTURBANCE, PSYCHOTIC DISTURBANCE, MOOD DISTURBANCE, AND ANXIETY: Chronic | Status: ACTIVE | Noted: 2022-12-24

## 2023-01-21 LAB
ALBUMIN SERPL ELPH-MCNC: 1.4 G/DL — LOW (ref 3.3–5)
ALP SERPL-CCNC: 98 U/L — SIGNIFICANT CHANGE UP (ref 40–120)
ALT FLD-CCNC: 32 U/L — SIGNIFICANT CHANGE UP (ref 10–45)
ANION GAP SERPL CALC-SCNC: 6 MMOL/L — SIGNIFICANT CHANGE UP (ref 5–17)
ANISOCYTOSIS BLD QL: SLIGHT — SIGNIFICANT CHANGE UP
APTT BLD: 34.5 SEC — SIGNIFICANT CHANGE UP (ref 27.5–35.5)
AST SERPL-CCNC: 43 U/L — HIGH (ref 10–40)
BASOPHILS # BLD AUTO: 0 K/UL — SIGNIFICANT CHANGE UP (ref 0–0.2)
BASOPHILS NFR BLD AUTO: 0 % — SIGNIFICANT CHANGE UP (ref 0–2)
BILIRUB SERPL-MCNC: 0.3 MG/DL — SIGNIFICANT CHANGE UP (ref 0.2–1.2)
BLD GP AB SCN SERPL QL: SIGNIFICANT CHANGE UP
BUN SERPL-MCNC: 17 MG/DL — SIGNIFICANT CHANGE UP (ref 7–23)
CALCIUM SERPL-MCNC: 7.7 MG/DL — LOW (ref 8.4–10.5)
CHLORIDE SERPL-SCNC: 104 MMOL/L — SIGNIFICANT CHANGE UP (ref 96–108)
CO2 SERPL-SCNC: 24 MMOL/L — SIGNIFICANT CHANGE UP (ref 22–31)
CREAT SERPL-MCNC: 0.68 MG/DL — SIGNIFICANT CHANGE UP (ref 0.5–1.3)
EGFR: 84 ML/MIN/1.73M2 — SIGNIFICANT CHANGE UP
EOSINOPHIL # BLD AUTO: 0.07 K/UL — SIGNIFICANT CHANGE UP (ref 0–0.5)
EOSINOPHIL NFR BLD AUTO: 1 % — SIGNIFICANT CHANGE UP (ref 0–6)
GLUCOSE SERPL-MCNC: 105 MG/DL — HIGH (ref 70–99)
HCT VFR BLD CALC: 22 % — LOW (ref 34.5–45)
HCT VFR BLD CALC: 28.4 % — LOW (ref 34.5–45)
HGB BLD-MCNC: 6.9 G/DL — CRITICAL LOW (ref 11.5–15.5)
HGB BLD-MCNC: 9.5 G/DL — LOW (ref 11.5–15.5)
HYPOCHROMIA BLD QL: SIGNIFICANT CHANGE UP
INR BLD: 1.49 RATIO — HIGH (ref 0.88–1.16)
LYMPHOCYTES # BLD AUTO: 1.75 K/UL — SIGNIFICANT CHANGE UP (ref 1–3.3)
LYMPHOCYTES # BLD AUTO: 25 % — SIGNIFICANT CHANGE UP (ref 13–44)
MANUAL SMEAR VERIFICATION: SIGNIFICANT CHANGE UP
MCHC RBC-ENTMCNC: 29.1 PG — SIGNIFICANT CHANGE UP (ref 27–34)
MCHC RBC-ENTMCNC: 31.4 GM/DL — LOW (ref 32–36)
MCV RBC AUTO: 92.8 FL — SIGNIFICANT CHANGE UP (ref 80–100)
MONOCYTES # BLD AUTO: 0.35 K/UL — SIGNIFICANT CHANGE UP (ref 0–0.9)
MONOCYTES NFR BLD AUTO: 5 % — SIGNIFICANT CHANGE UP (ref 2–14)
MYELOCYTES NFR BLD: 2 % — HIGH (ref 0–0)
NEUTROPHILS # BLD AUTO: 4.33 K/UL — SIGNIFICANT CHANGE UP (ref 1.8–7.4)
NEUTROPHILS NFR BLD AUTO: 61 % — SIGNIFICANT CHANGE UP (ref 43–77)
NEUTS BAND # BLD: 1 % — SIGNIFICANT CHANGE UP (ref 0–8)
NRBC # BLD: 0 /100 — SIGNIFICANT CHANGE UP (ref 0–0)
OB PNL STL: POSITIVE
PLAT MORPH BLD: NORMAL — SIGNIFICANT CHANGE UP
PLATELET # BLD AUTO: 460 K/UL — HIGH (ref 150–400)
POTASSIUM SERPL-MCNC: 3.6 MMOL/L — SIGNIFICANT CHANGE UP (ref 3.5–5.3)
POTASSIUM SERPL-SCNC: 3.6 MMOL/L — SIGNIFICANT CHANGE UP (ref 3.5–5.3)
PROT SERPL-MCNC: 5 G/DL — LOW (ref 6–8.3)
PROTHROM AB SERPL-ACNC: 17.4 SEC — HIGH (ref 10.5–13.4)
RBC # BLD: 2.37 M/UL — LOW (ref 3.8–5.2)
RBC # FLD: 19.4 % — HIGH (ref 10.3–14.5)
RBC BLD AUTO: ABNORMAL
SARS-COV-2 RNA SPEC QL NAA+PROBE: DETECTED
SODIUM SERPL-SCNC: 134 MMOL/L — LOW (ref 135–145)
VARIANT LYMPHS # BLD: 5 % — SIGNIFICANT CHANGE UP (ref 0–6)
WBC # BLD: 6.98 K/UL — SIGNIFICANT CHANGE UP (ref 3.8–10.5)
WBC # FLD AUTO: 6.98 K/UL — SIGNIFICANT CHANGE UP (ref 3.8–10.5)

## 2023-01-21 PROCEDURE — 93010 ELECTROCARDIOGRAM REPORT: CPT

## 2023-01-21 PROCEDURE — 99285 EMERGENCY DEPT VISIT HI MDM: CPT

## 2023-01-21 PROCEDURE — 99223 1ST HOSP IP/OBS HIGH 75: CPT

## 2023-01-21 PROCEDURE — 71045 X-RAY EXAM CHEST 1 VIEW: CPT | Mod: 26

## 2023-01-21 PROCEDURE — 99285 EMERGENCY DEPT VISIT HI MDM: CPT | Mod: FS,CS

## 2023-01-21 RX ORDER — BRIMONIDINE TARTRATE 2 MG/MG
1 SOLUTION/ DROPS OPHTHALMIC EVERY 12 HOURS
Refills: 0 | Status: DISCONTINUED | OUTPATIENT
Start: 2023-01-21 | End: 2023-01-25

## 2023-01-21 RX ORDER — DORZOLAMIDE HYDROCHLORIDE TIMOLOL MALEATE 20; 5 MG/ML; MG/ML
1 SOLUTION/ DROPS OPHTHALMIC EVERY 12 HOURS
Refills: 0 | Status: DISCONTINUED | OUTPATIENT
Start: 2023-01-21 | End: 2023-01-25

## 2023-01-21 RX ORDER — LANOLIN ALCOHOL/MO/W.PET/CERES
3 CREAM (GRAM) TOPICAL AT BEDTIME
Refills: 0 | Status: DISCONTINUED | OUTPATIENT
Start: 2023-01-21 | End: 2023-01-25

## 2023-01-21 RX ORDER — MIDODRINE HYDROCHLORIDE 2.5 MG/1
5 TABLET ORAL EVERY 8 HOURS
Refills: 0 | Status: DISCONTINUED | OUTPATIENT
Start: 2023-01-21 | End: 2023-01-22

## 2023-01-21 RX ORDER — PANTOPRAZOLE SODIUM 20 MG/1
40 TABLET, DELAYED RELEASE ORAL
Refills: 0 | Status: DISCONTINUED | OUTPATIENT
Start: 2023-01-21 | End: 2023-01-23

## 2023-01-21 RX ORDER — SODIUM CHLORIDE 9 MG/ML
1000 INJECTION INTRAMUSCULAR; INTRAVENOUS; SUBCUTANEOUS
Refills: 0 | Status: DISCONTINUED | OUTPATIENT
Start: 2023-01-21 | End: 2023-01-22

## 2023-01-21 RX ORDER — POLYETHYLENE GLYCOL 3350 17 G/17G
17 POWDER, FOR SOLUTION ORAL
Refills: 0 | Status: DISCONTINUED | OUTPATIENT
Start: 2023-01-21 | End: 2023-01-25

## 2023-01-21 RX ORDER — ONDANSETRON 8 MG/1
4 TABLET, FILM COATED ORAL EVERY 8 HOURS
Refills: 0 | Status: DISCONTINUED | OUTPATIENT
Start: 2023-01-21 | End: 2023-01-25

## 2023-01-21 RX ORDER — PANTOPRAZOLE SODIUM 20 MG/1
40 TABLET, DELAYED RELEASE ORAL ONCE
Refills: 0 | Status: COMPLETED | OUTPATIENT
Start: 2023-01-21 | End: 2023-01-21

## 2023-01-21 RX ORDER — ACETAMINOPHEN 500 MG
650 TABLET ORAL EVERY 6 HOURS
Refills: 0 | Status: DISCONTINUED | OUTPATIENT
Start: 2023-01-21 | End: 2023-01-25

## 2023-01-21 RX ADMIN — BRIMONIDINE TARTRATE 1 DROP(S): 2 SOLUTION/ DROPS OPHTHALMIC at 17:46

## 2023-01-21 RX ADMIN — PANTOPRAZOLE SODIUM 40 MILLIGRAM(S): 20 TABLET, DELAYED RELEASE ORAL at 11:41

## 2023-01-21 RX ADMIN — PANTOPRAZOLE SODIUM 40 MILLIGRAM(S): 20 TABLET, DELAYED RELEASE ORAL at 17:46

## 2023-01-21 RX ADMIN — SODIUM CHLORIDE 60 MILLILITER(S): 9 INJECTION INTRAMUSCULAR; INTRAVENOUS; SUBCUTANEOUS at 17:46

## 2023-01-21 RX ADMIN — DORZOLAMIDE HYDROCHLORIDE TIMOLOL MALEATE 1 DROP(S): 20; 5 SOLUTION/ DROPS OPHTHALMIC at 17:46

## 2023-01-21 NOTE — CONSULT NOTE ADULT - TIME BILLING
Review of chart, blood work, counseling of patient with daughter and answering questions.  Discussion of plan with patient's nurse and hospitalist service.

## 2023-01-21 NOTE — H&P ADULT - HISTORY OF PRESENT ILLNESS
Unable to obtain history from patient due to nonverbal state. All hx obtained from chart review and ED.    87F with dementia, colon cancer hx of resection, recent hospitalization for bilateral PE / gluteal abscess / sacral decub debridement, returns to hospital for abnormal hemoglobin, fecal occult positive, in need for blood transfusion. + Melena in diaper. Of note, patient dx with COVID at [facility] (date unknown) and is on her final day of paxlovid. Noted DNR documented.

## 2023-01-21 NOTE — ED ADULT NURSE NOTE - CHIEF COMPLAINT QUOTE
Patient BIB EMS from [facility] for low hemoglobin 7.0. Patient non verbal, HX of Dementia. Tested + Covid x 2 weeks ago.

## 2023-01-21 NOTE — ED PROVIDER NOTE - ATTENDING APP SHARED VISIT CONTRIBUTION OF CARE
Dr. Lyle: I performed a face to face bedside interview with patient regarding history of present illness, review of symptoms and past medical history. I completed an independent physical exam.  I have discussed patient's plan of care with PA.   I agree with note as stated above, having amended the EMR as needed to reflect my findings.   This includes HISTORY OF PRESENT ILLNESS, HIV, PAST MEDICAL/SURGICAL/FAMILY/SOCIAL HISTORY, ALLERGIES AND HOME MEDICATIONS, REVIEW OF SYSTEMS, PHYSICAL EXAM, and any PROGRESS NOTES during the time I functioned as the attending physician for this patient.    dr lyle: 87 yr old female with hx of dementia, non verbal presents from [facility] for low hemoglobin 7.0. Patient non verbal. Pt tested positive for covid 2 weeks ago.  rectal done by Dr. Lyle, MELLISSA Sharma chaperon- melena noted on exam in diaper    wound vac noted to sacrum   abdomen soft non tender   labs sent, plan to admit to medicine, dr penn aware

## 2023-01-21 NOTE — CONSULT NOTE ADULT - PROBLEM SELECTOR RECOMMENDATION 2
Monitor blood count and consider endoscopic w/u, pending course and further history determination.    Please contact if any acute changes

## 2023-01-21 NOTE — ED PROVIDER NOTE - CLINICAL SUMMARY MEDICAL DECISION MAKING FREE TEXT BOX
87 yr old female with hx of dementia, non verbal presents from [facility] for low hemoglobin 7.0. Patient non verbal. Pt tested positive for covid 2 weeks ago.  rectal done by MELLISSA Loo- melena noted on exam in diaper    wound vac noted to sacrum   abdomen soft non tender   labs sent, plan to admit to medicine

## 2023-01-21 NOTE — CONSULT NOTE ADULT - ASSESSMENT
87-year-old female who is admitted for profound anemia blood transfusion and trending of her hemoglobin.  She has a known sacral decubitus ulcer which is being managed with noninvasive therapy and currently has a working of VAC dressing.  Patient's wound will be evaluated on Monday to Friday schedule where the wound VAC will be taken down to evaluate the wound.  Otherwise no emergent surgical need at this point for any debridement.  Discussed with hospitalist service.

## 2023-01-21 NOTE — H&P ADULT - NSHPLABSRESULTS_GEN_ALL_CORE
.                            6.9    6.98  )-----------( 460      ( 21 Jan 2023 11:00 )             22.0       01-21    134  |  104  |  17  ----------------------------<  105  3.6   |  24  |  0.68    Ca    7.7      21 Jan 2023 11:00    TPro  5.0  /  Alb  1.4  /  TBili  0.3  /  DBili  x   /  AST  43  /  ALT  32  /  AlkPhos  98  01-21    PT/INR - ( 21 Jan 2023 11:00 )   PT: 17.4 sec;   INR: 1.49 ratio         PTT - ( 21 Jan 2023 11:00 )  PTT:34.5 sec          12-25 Chol 110 mg/dL LDL -- HDL 37 mg/dL Trig 66 mg/dL                      COVID-19 PCR: Detected (01-21-23 @ 11:30)

## 2023-01-21 NOTE — ED PROVIDER NOTE - NS ED ATTENDING STATEMENT MOD
This was a shared visit with the TYREL. I reviewed and verified the documentation and independently performed the documented:

## 2023-01-21 NOTE — ED PROVIDER NOTE - OBJECTIVE STATEMENT
87 yr old female with hx of dementia, non verbal presents from [facility] for low hemoglobin 7.0. Patient non verbal. Pt tested positive for covid 2 weeks ago.

## 2023-01-21 NOTE — ED ADULT TRIAGE NOTE - CHIEF COMPLAINT QUOTE
Patient BIB EMS from [facility] for low hemoglobin 7.0. Patient non verbal, HX of Dementia. Tested + Covid x 2 weeks ago.

## 2023-01-21 NOTE — H&P ADULT - ASSESSMENT
87F nonverbal with dementia, hx colon ca with resection, recently hospitalization for sacral decub requiring debridement as well as newly dx bilateral PE, comes to hospital today from [facility] for abnormally low hemoglobin in setting of melena    #Acute blood loss anemia secondary to GI bleed  #Acute GI bleed, presumed upper  -IV PPI bid  -GI consult: Dr. Tavares aware  -Transfuse 1U PRBC then repeat CBC to see next steps  -CBC tonight and tomorrow AM  -NPO for now (usually on minced diet, thin liquids)  -Hold ASA and Eliquis (risk > benefit)    #History of bilateral PE (diagnosed in Dec 2022)  #History of right femoral DVT  -Hold Eliquis, risk > benefit  -Unclear indication for ASA, was not on this prior to rehab (per daughter) ... no hx CAD or CVA or PAD .... should not resume this at all, because of increased bleeding risk with Eliquis    #Dementia without behavioral disturbance  #Nonverbal due to above  -Supportive care    #Sacral wound with wound vac in place  -Dr. Liza Iglesias will see patient - he was involved with treating this patient previously     DVT ppx: SCDs to left leg (will avoid right leg as this was the site of the initial DVT)...  re-eval ability to resume Eliquis pending stability of Hb    Case d/w patient's daughter, Bruno, [phone], all questions answered.     Advanced Care Planning: DNR/DNI, copy of prior MOLST placed in chart, confirmed with daughter  87F nonverbal with dementia, hx colon ca with resection, recently hospitalization for sacral decub requiring debridement as well as newly dx bilateral PE, comes to hospital today from [facility] for abnormally low hemoglobin in setting of melena    #Acute blood loss anemia secondary to GI bleed  #Acute GI bleed, presumed upper  -IV PPI bid  -GI consult: Dr. Tavares aware  -Transfuse 1U PRBC then repeat CBC to see next steps  -CBC tonight and tomorrow AM  -NPO for now (usually on minced diet, thin liquids)  -Hold ASA and Eliquis (risk > benefit)    #History of bilateral PE (diagnosed in Dec 2022)  #History of right femoral DVT  -Hold Eliquis, risk > benefit  -Unclear indication for ASA, was not on this prior to rehab (per daughter) ... no hx CAD or CVA or PAD .... should not resume this at all, because of increased bleeding risk with Eliquis    #Dementia without behavioral disturbance  #Nonverbal due to above  -Supportive care    #Sacral wound with wound vac in place  -Dr. Liza Iglesias will see patient - he was involved with treating this patient previously     #Hypotension  -c/w Midodrine  -Would try to taper this medication once GI bleeding confirmed stable      DVT ppx: SCDs to left leg (will avoid right leg as this was the site of the initial DVT)...  re-eval ability to resume Eliquis pending stability of Hb    Case d/w patient's daughter, Bruno, [phone], all questions answered.     Advanced Care Planning: DNR/DNI, copy of prior MOLST placed in chart, confirmed with daughter

## 2023-01-21 NOTE — H&P ADULT - NSHPPHYSICALEXAM_GEN_ALL_CORE
Vital Signs Last 24 Hrs  T(F): 97.6 (21 Jan 2023 10:26), Max: 97.6 (21 Jan 2023 10:26)  HR: 70 (21 Jan 2023 10:26) (70 - 70)  BP: 126/78 (21 Jan 2023 10:26) (126/78 - 126/78)  RR: --  SpO2: 96% (21 Jan 2023 10:26) (96% - 96%)    PHYSICAL EXAM:  GENERAL: nonverbal  HEAD:  Atraumatic, Normocephalic  EYES: EOMI, conjunctiva and sclera clear  ENMT: No gross hearing impairment, midline tongue, patent nares  NECK: Supple, No JVD  CHEST/LUNG: Clear to auscultation bilaterally limited to poor effort, good air entry, non-labored breathing  HEART: RRR; S1/S2  ABDOMEN: Soft, Nontender, Nondistended; Bowel sounds present  VASCULAR: Normal pulses, Normal capillary refill  EXTREMITIES: No calf tenderness, No cyanosis, + weeping pitting edema / anasarca ; bilateral heel boots in place  LYMPH: Normal; No lymphadenopathy noted  SKIN: Warm, sacral wound vac in place with active drainage; right hip wound with eschar foci, scattered bruising   PSYCH: Poor concentration, nonverbal  NERVOUS SYSTEM:  Alert but does not follow commands, nonverbal,  No other focal deficits noted although limited exam

## 2023-01-21 NOTE — CONSULT NOTE ADULT - PROBLEM SELECTOR RECOMMENDATION 9
Melena with drop in blood count with ddx of upper or prox col bleed, with history of colon ca (resected)...    Daughter not at bedside but will try to obtain more history.    rec BID PPI  monitor serial cbc  tz as need  keep t and c active  Hold asa and eliquis  will monitor
abdominal pain

## 2023-01-21 NOTE — ED ADULT NURSE NOTE - OBJECTIVE STATEMENT
Patient BIB EMS from [facility] for low hemoglobin 7.0. Patient non verbal, HX of Dementia. Tested + Covid x 2 weeks ago.

## 2023-01-21 NOTE — H&P ADULT - NSHPOUTPATIENTPROVIDERS_GEN_ALL_CORE
Unable to obtain as patient nonverbal, listed provider Dr. Jordyn Stauffer at [facility]

## 2023-01-21 NOTE — ED PROVIDER NOTE - PHYSICAL EXAMINATION
rectal done by MELLISSA Loo- melena noted on exam in diaper rectal done by MELLISSA Loo- melena noted on exam in diaper  wound vac on sacrum  picc line rue

## 2023-01-21 NOTE — CONSULT NOTE ADULT - SUBJECTIVE AND OBJECTIVE BOX
87-year-old female who is known to my service is being admitted now for profound anemia with a lower GI bleed requiring blood transfusion.  Patient is known to the surgical service because of a recent excisional debridement and wide excision of an infected sacral decubitus ulcer and left decubitus ulcer.  She was previously admitted and sepsis and was on IV antibiotics and was subsequently discharged with a VAC dressing to subacute rehab facility.  She has since had COVID diagnosed almost 2 weeks ago and presents mainly for the profound anemia.  Surgery has been consulted because of the history of sacral decubitus ulcer.  The patient lives with her daughter.  She is unaware of any new changes or issues or complaints about the sacral decubitus ulcer.  The VAC dressing appears to be intact and in place.And patient does not seem to be in any acute distress at this time.  PAST MEDICAL & SURGICAL HISTORY:  Colon cancer      Dementia      S/P colon resection          MEDICATIONS  (STANDING):  brimonidine 0.2% Ophthalmic Solution 1 Drop(s) Both EYES every 12 hours  dorzolamide 2%/timolol 0.5% Ophthalmic Solution 1 Drop(s) Both EYES every 12 hours  midodrine 5 milliGRAM(s) Oral every 8 hours  pantoprazole  Injectable 40 milliGRAM(s) IV Push two times a day  polyethylene glycol 3350 17 Gram(s) Oral two times a day  sodium chloride 0.9%. 1000 milliLiter(s) (60 mL/Hr) IV Continuous <Continuous>    MEDICATIONS  (PRN):  acetaminophen     Tablet .. 650 milliGRAM(s) Oral every 6 hours PRN Temp greater or equal to 38C (100.4F), Mild Pain (1 - 3)  aluminum hydroxide/magnesium hydroxide/simethicone Suspension 30 milliLiter(s) Oral every 4 hours PRN Dyspepsia  melatonin 3 milliGRAM(s) Oral at bedtime PRN Insomnia  ondansetron Injectable 4 milliGRAM(s) IV Push every 8 hours PRN Nausea and/or Vomiting      Allergies    No Known Allergies    Intolerances        Physical Exam:  General: NAD, resting comfortably  HEENT: NC/AT, EOMI, normal hearing, no oral lesions, no LAD, neck supple  Pulmonary: CTA B/L  Cardiovascular: S1, S2 w/RRR  Abdominal: soft, ND/NT  Extremities:  no clubbing/cyanosis/edema  Sacrum: vac in place with no leak and on good suction.  Neuro: A/O x 3    Vital Signs Last 24 Hrs  T(C): 36.4 (21 Jan 2023 20:54), Max: 36.4 (21 Jan 2023 10:26)  T(F): 97.6 (21 Jan 2023 20:54), Max: 97.6 (21 Jan 2023 10:26)  HR: 58 (21 Jan 2023 20:54) (58 - 77)  BP: 152/76 (21 Jan 2023 20:54) (126/78 - 152/76)  BP(mean): --  RR: 17 (21 Jan 2023 20:54) (16 - 17)  SpO2: 96% (21 Jan 2023 20:54) (96% - 96%)    Parameters below as of 21 Jan 2023 20:54  Patient On (Oxygen Delivery Method): room air        I&O's Summary          LABS:                        9.5    x     )-----------( x        ( 21 Jan 2023 20:17 )             28.4     01-21    134<L>  |  104  |  17  ----------------------------<  105<H>  3.6   |  24  |  0.68    Ca    7.7<L>      21 Jan 2023 11:00    TPro  5.0<L>  /  Alb  1.4<L>  /  TBili  0.3  /  DBili  x   /  AST  43<H>  /  ALT  32  /  AlkPhos  98  01-21    PT/INR - ( 21 Jan 2023 11:00 )   PT: 17.4 sec;   INR: 1.49 ratio         PTT - ( 21 Jan 2023 11:00 )  PTT:34.5 sec    CAPILLARY BLOOD GLUCOSE        LIVER FUNCTIONS - ( 21 Jan 2023 11:00 )  Alb: 1.4 g/dL / Pro: 5.0 g/dL / ALK PHOS: 98 U/L / ALT: 32 U/L / AST: 43 U/L / GGT: x             Cultures:      RADIOLOGY & ADDITIONAL STUDIES:      
Gastroenterology Consultation:    Patient is a 87y old  Female who presents with melena noted in diaper, found to be fobt +        Admitted on: 01-21-23      HPI:  Unable to obtain history from patient due to nonverbal state. All hx obtained from chart review and ED.    87F with dementia, colon cancer hx of resection, recent hospitalization for bilateral PE / gluteal abscess / sacral decub debridement, returns to hospital for abnormal hemoglobin, fecal occult positive, in need for blood transfusion. + Melena in diaper. Of note, patient dx with COVID at [facility] (date unknown) and is on her final day of paxlovid. Noted DNR documented. (21 Jan 2023 14:08)          Medication use: Denies any use of NSAIDs, C/S, ASA, EtOh and Antithrombotics or herbal remedies like Gingko, Garlic, and Ginseng    GI Hx: Denies any hx of bleeds, dx of CLD of IBD, any malignancies, dx of PUD. Patient has not had any recent GI procedures including spincterotomy or polypectomy. Denies any vascular surgical intervention and hx of radiation therapy. Patient denies any fever, abdominal pain, weight loss, dysphagia.     Prior EGD:    Prior Colonoscopy:      PAST MEDICAL & SURGICAL HISTORY:  Colon cancer      Dementia      S/P colon resection        Home Medications:  cefepime 1 g intravenous injection:  (21 Jan 2023 14:20)  Paxlovid 150 mg-100 mg Dose Pack oral tablet:  (21 Jan 2023 14:20)        MEDICATIONS  (STANDING):  brimonidine 0.2% Ophthalmic Solution 1 Drop(s) Both EYES every 12 hours  dorzolamide 2%/timolol 0.5% Ophthalmic Solution 1 Drop(s) Both EYES every 12 hours  midodrine 5 milliGRAM(s) Oral every 8 hours  pantoprazole  Injectable 40 milliGRAM(s) IV Push two times a day  polyethylene glycol 3350 17 Gram(s) Oral two times a day  sodium chloride 0.9%. 1000 milliLiter(s) (60 mL/Hr) IV Continuous <Continuous>    MEDICATIONS  (PRN):  acetaminophen     Tablet .. 650 milliGRAM(s) Oral every 6 hours PRN Temp greater or equal to 38C (100.4F), Mild Pain (1 - 3)  aluminum hydroxide/magnesium hydroxide/simethicone Suspension 30 milliLiter(s) Oral every 4 hours PRN Dyspepsia  melatonin 3 milliGRAM(s) Oral at bedtime PRN Insomnia  ondansetron Injectable 4 milliGRAM(s) IV Push every 8 hours PRN Nausea and/or Vomiting      Allergies  No Known Allergies      Review of Systems:   Cardiovascular:  No Chest Pain, No Palpitations  Respiratory:  pe/dvt's  Gastrointestinal:  As described in HPI  Musculoskeletal: contractions  Skin:  decub            Physical Examination:  T(C): 36.4 (01-21-23 @ 10:26), Max: 36.4 (01-21-23 @ 10:26)  HR: 70 (01-21-23 @ 10:26) (70 - 70)  BP: 126/78 (01-21-23 @ 10:26) (126/78 - 126/78)  RR: --  SpO2: 96% (01-21-23 @ 10:26) (96% - 96%)    Weight (kg): 48.1 (01-21-23 @ 10:26)        GENERAL:  Appears chronically ill  HEENT:  NC/AT, anict; poor dentita  CHEST:  Full & symmetric excursion, no increased effort, breath sounds clear  HEART:  Regular rhythm, S1, S2,  ABDOMEN:  Soft, non-tender, non-distended, normoactive bowel sounds,  no masses ,no hepato-splenomegaly, no signs of chronic liver disease  EXTEREMITIES: contracted with boots on,  SKIN:  dressed decub ulcers  NEURO:  responds to verbal stim          Data:                        6.9    6.98  )-----------( 460      ( 21 Jan 2023 11:00 )             22.0     Hgb Trend:  6.9  01-21-23 @ 11:00      01-21    134<L>  |  104  |  17  ----------------------------<  105<H>  3.6   |  24  |  0.68    Ca    7.7<L>      21 Jan 2023 11:00    TPro  5.0<L>  /  Alb  1.4<L>  /  TBili  0.3  /  DBili  x   /  AST  43<H>  /  ALT  32  /  AlkPhos  98  01-21    Liver panel trend:  TBili 0.3   /   AST 43   /   ALT 32   /   AlkP 98   /   Tptn 5.0   /   Alb 1.4    /   DBili --      01-21      PT/INR - ( 21 Jan 2023 11:00 )   PT: 17.4 sec;   INR: 1.49 ratio         PTT - ( 21 Jan 2023 11:00 )  PTT:34.5 sec        Radiology:    passed study 12/2022    INTERPRETATION:  CLINICAL INFORMATION: Shortness of breath, evaluate for   DVT. Fever, assess for sacral decubitus ulceration.    COMPARISON: None.    CONTRAST/COMPLICATIONS:  IV Contrast: Omnipaque 350 (accession [id number]), IV contrast documented   in unlinked concurrent exam (accession [id number])  90 cc administered   10   cc discarded  Oral Contrast: NONE  Complications: None reported at time of study completion    PROCEDURE:  CT Angiography of the Chest was performed followed by portal venous phase   imaging of the Abdomen and Pelvis.  Sagittal and coronal reformats were performed as well as 3D (MIP)   reconstructions.    FINDINGS:  CHEST:  LUNGS AND LARGE AIRWAYS: Patent central airways. Opacity within the   inferior/posterior aspect of the right lower lobe may represent   atelectasis versus infiltrate.  PLEURA: Small bilateral pleural effusions.  VESSELS: Bilateral pulmonary emboli identified.  HEART: Heart size is normal. No pericardial effusion.  MEDIASTINUM AND SOLITARIO: No lymphadenopathy.  CHEST WALL AND LOWER NECK: Hypodense nodules measuring up to 7 mm   identified within the visualized portion of the thyroid parenchyma.    ABDOMEN AND PELVIS:  LIVER: Within normal limits.  BILE DUCTS: Normal caliber.  GALLBLADDER: Not visualized.  SPLEEN: Within normal limits.  PANCREAS: Within normal limits.  ADRENALS: Within normal limits.  KIDNEYS/URETERS: No hydronephrosis. No renal calculi. Subcentimeter   hypodense foci are noted within the bilateral renal parenchyma, too small   to characterize.    BLADDER: Within normal limits.  REPRODUCTIVE ORGANS: Uterus and adnexa within normal limits.    BOWEL: Abundant fecal material scattered throughout the colon and rectum.   An element of fecal rectal impaction cannot be fully excluded. Appendix   is not visualized.  PERITONEUM: No ascites.  VESSELS: Thrombus is identified within the right femoral vein as well as   deep femoral vein consistent with DVT.  RETROPERITONEUM/LYMPH NODES: No lymphadenopathy.  ABDOMINAL WALL: There is a marginally enhancing air/fluid collection,   which may be multiloculated measuring 4.5x 2.0 cm within the left   gluteal musculature. Subcutaneous air is identified overlying the   inferior sacrum and medial right gluteal region. Diffuse anasarca.  BONES: Droplets of epidural air are identified at the level of the sacral   canal; the patient is status post laminectomy at L4-L5. Differential   considerations of the epidural air would include extension of the sacral   decubitus infection versus secondary to degenerative change.    IMPRESSION:  1. Bilateral pulmonary emboli. Thrombusis identified within the right   femoral vein as well as deep femoral vein consistent with DVT.  2. There is a marginally enhancing air/fluid collection, which may be   multiloculated measuring 4.5 x 2.0 cm within the left gluteal   musculature. Subcutaneous air is identified overlying the inferior sacrum   and medial right gluteal region.  3. Droplets of epidural air are identified at the level of the sacral   canal; the patient is status post laminectomy at L4-L5. Differential   considerations of the epidural air would include extension of the sacral   decubitus infection versus secondary to degenerative change.

## 2023-01-22 LAB
ANION GAP SERPL CALC-SCNC: 8 MMOL/L — SIGNIFICANT CHANGE UP (ref 5–17)
BUN SERPL-MCNC: 13 MG/DL — SIGNIFICANT CHANGE UP (ref 7–23)
CALCIUM SERPL-MCNC: 7.7 MG/DL — LOW (ref 8.4–10.5)
CHLORIDE SERPL-SCNC: 103 MMOL/L — SIGNIFICANT CHANGE UP (ref 96–108)
CO2 SERPL-SCNC: 25 MMOL/L — SIGNIFICANT CHANGE UP (ref 22–31)
CREAT SERPL-MCNC: 0.63 MG/DL — SIGNIFICANT CHANGE UP (ref 0.5–1.3)
EGFR: 86 ML/MIN/1.73M2 — SIGNIFICANT CHANGE UP
GLUCOSE SERPL-MCNC: 87 MG/DL — SIGNIFICANT CHANGE UP (ref 70–99)
HCT VFR BLD CALC: 29.8 % — LOW (ref 34.5–45)
HGB BLD-MCNC: 10.1 G/DL — LOW (ref 11.5–15.5)
MAGNESIUM SERPL-MCNC: 1.8 MG/DL — SIGNIFICANT CHANGE UP (ref 1.6–2.6)
MCHC RBC-ENTMCNC: 30.4 PG — SIGNIFICANT CHANGE UP (ref 27–34)
MCHC RBC-ENTMCNC: 33.9 GM/DL — SIGNIFICANT CHANGE UP (ref 32–36)
MCV RBC AUTO: 89.8 FL — SIGNIFICANT CHANGE UP (ref 80–100)
NRBC # BLD: 0 /100 WBCS — SIGNIFICANT CHANGE UP (ref 0–0)
PHOSPHATE SERPL-MCNC: 3.2 MG/DL — SIGNIFICANT CHANGE UP (ref 2.5–4.5)
PLATELET # BLD AUTO: 429 K/UL — HIGH (ref 150–400)
POTASSIUM SERPL-MCNC: 3.3 MMOL/L — LOW (ref 3.5–5.3)
POTASSIUM SERPL-SCNC: 3.3 MMOL/L — LOW (ref 3.5–5.3)
RBC # BLD: 3.32 M/UL — LOW (ref 3.8–5.2)
RBC # FLD: 16.7 % — HIGH (ref 10.3–14.5)
SODIUM SERPL-SCNC: 136 MMOL/L — SIGNIFICANT CHANGE UP (ref 135–145)
WBC # BLD: 6.38 K/UL — SIGNIFICANT CHANGE UP (ref 3.8–10.5)
WBC # FLD AUTO: 6.38 K/UL — SIGNIFICANT CHANGE UP (ref 3.8–10.5)

## 2023-01-22 PROCEDURE — 99233 SBSQ HOSP IP/OBS HIGH 50: CPT | Mod: FS

## 2023-01-22 PROCEDURE — 99232 SBSQ HOSP IP/OBS MODERATE 35: CPT

## 2023-01-22 RX ORDER — POTASSIUM CHLORIDE 20 MEQ
40 PACKET (EA) ORAL EVERY 4 HOURS
Refills: 0 | Status: COMPLETED | OUTPATIENT
Start: 2023-01-22 | End: 2023-01-22

## 2023-01-22 RX ADMIN — DORZOLAMIDE HYDROCHLORIDE TIMOLOL MALEATE 1 DROP(S): 20; 5 SOLUTION/ DROPS OPHTHALMIC at 05:37

## 2023-01-22 RX ADMIN — PANTOPRAZOLE SODIUM 40 MILLIGRAM(S): 20 TABLET, DELAYED RELEASE ORAL at 05:38

## 2023-01-22 RX ADMIN — DORZOLAMIDE HYDROCHLORIDE TIMOLOL MALEATE 1 DROP(S): 20; 5 SOLUTION/ DROPS OPHTHALMIC at 17:19

## 2023-01-22 RX ADMIN — SODIUM CHLORIDE 60 MILLILITER(S): 9 INJECTION INTRAMUSCULAR; INTRAVENOUS; SUBCUTANEOUS at 05:38

## 2023-01-22 RX ADMIN — BRIMONIDINE TARTRATE 1 DROP(S): 2 SOLUTION/ DROPS OPHTHALMIC at 17:20

## 2023-01-22 RX ADMIN — BRIMONIDINE TARTRATE 1 DROP(S): 2 SOLUTION/ DROPS OPHTHALMIC at 05:37

## 2023-01-22 RX ADMIN — PANTOPRAZOLE SODIUM 40 MILLIGRAM(S): 20 TABLET, DELAYED RELEASE ORAL at 17:24

## 2023-01-22 NOTE — PHYSICAL THERAPY INITIAL EVALUATION ADULT - PERTINENT HX OF CURRENT PROBLEM, REHAB EVAL
87F nonverbal with dementia, hx colon ca with resection, recently hospitalization for sacral decub requiring debridement as well as newly dx bilateral PE, comes to hospital from [facility] for abnormally low hemoglobin in setting of melena, of note, patient dx with COVID at [facility] (date unknown), hx of bilateral PE (Dec 2022) and right femoral DVT

## 2023-01-22 NOTE — PHYSICAL THERAPY INITIAL EVALUATION ADULT - ADDITIONAL COMMENTS
per chart pt from [facility], level of assist/prior status unknown, pt unable to provide any info
Yes

## 2023-01-22 NOTE — PROGRESS NOTE ADULT - ASSESSMENT
87 year old female  nonverbal with dementia, hx colon ca with resection, recently hospitalization for sacral decub requiring debridement as well as newly dx bilateral PE,  Admitted to hospital for acute anemia and observed  melena found in incontinence brief.  AC held and 1 U PrBC with appropriate response Hgb 10 today.     No documented bleeding. Patient was on ASA which will be held and eliquis for PE.   Possible decline in h/h related also to wound vac losses

## 2023-01-22 NOTE — PROGRESS NOTE ADULT - PROBLEM SELECTOR PLAN 1
-c/w PPI  -Trend CBC  -transfuse for hgb <7  -keep t and c active  -plan is to dc asa and continue with eliquis for PE

## 2023-01-22 NOTE — PHYSICAL THERAPY INITIAL EVALUATION ADULT - REFERRING PHYSICIAN, REHAB EVAL
Below recommendations relayed to patient. Patient verbalized understanding and is agreeable to plan. Script modified and sent to [facility] pharmacy per patient's request.     Dr. Douglas

## 2023-01-22 NOTE — PHYSICAL THERAPY INITIAL EVALUATION ADULT - PASSIVE RANGE OF MOTION EXAMINATION, REHAB EVAL
LEs contracted, pt appears to have pain/moans with LE ROM/bilateral upper extremity Passive ROM was WFL (within functional limits)/bilateral lower extremity Passive ROM was WFL (within functional limits)

## 2023-01-22 NOTE — PROGRESS NOTE ADULT - ASSESSMENT
87F nonverbal with dementia, hx colon ca with resection, recently hospitalization for sacral decub requiring debridement as well as newly dx bilateral PE, comes to hospital today from [facility] for abnormally low hemoglobin in setting of melena    #Acute blood loss anemia secondary to GI bleed  #Acute GI bleed, presumed upper  -IV PPI bid - can switch to PO in AM assuming CBC remains stable  -GI consult: Dr. Tavares to follow  -s/p 1U PRBC with great response  -CBC in AM  -Advance to clear liquids today (usually on minced diet, thin liquids)  -Hold ASA indefinitely and Eliquis at least until tomorrow pending repeat CBC in AM     #History of bilateral PE (diagnosed in Dec 2022)  #History of right femoral DVT  -Eliquis currently on hold - would resume in AM if H/H stable... but would consider to resume at reduced dose of 2.5 mg bid, even though this is "suboptimal" - based on benefits/risks discussion with patient's daughter (who is in favor of reduced dose)  -No class I indication for ASA idenified.... was not on this prior to rehab (per daughter) ... no hx CAD or CVA or PAD .... should not resume this at all, because of increased bleeding risk with Eliquis    #Dementia without behavioral disturbance  #Nonverbal due to above  -Supportive care    #Sacral wound with wound vac in place  -Dr. Liza Iglesias following   -defer wound care to surgery team    #HTN  -hypotension in prior admission - started on Midodrine... . but now with hypertension  -STOP midodrine today, monitor BPs     DVT ppx: SCDs to left leg (will avoid right leg as this was the site of the initial DVT)...  re-eval ability to resume Eliquis pending stability of Hb in AM    Case d/w patient's daughter, Bruno, who is also a physician, [phone], all questions answered. She is in agreement with the above    Advanced Care Planning: DNR/DNI   87F nonverbal with dementia, hx colon ca with resection, recently hospitalization for sacral decub requiring debridement as well as newly dx bilateral PE, comes to hospital today from [facility] for abnormally low hemoglobin in setting of melena    #Acute blood loss anemia secondary to GI bleed  #Acute GI bleed, presumed upper  -IV PPI bid - can switch to PO in AM assuming CBC remains stable  -GI consult: Dr. Tavares to follow  -s/p 1U PRBC with great response  -CBC in AM  -Advance to clear liquids today (usually on minced diet, thin liquids)  -Hold ASA indefinitely and Eliquis at least until tomorrow pending repeat CBC in AM     #History of bilateral PE (diagnosed in Dec 2022)  #History of right femoral DVT  -Eliquis currently on hold - would resume in AM if H/H stable... but would consider to resume at reduced dose of 2.5 mg bid, even though this is "suboptimal" - based on benefits/risks discussion with patient's daughter (who is in favor of reduced dose)  -No class I indication for ASA idenified.... was not on this prior to rehab (per daughter) ... no hx CAD or CVA or PAD .... should not resume this at all, because of increased bleeding risk with Eliquis    #Dementia without behavioral disturbance  #Nonverbal due to above  -Supportive care    #Sacral wound with wound vac in place  -Dr. Liza Iglesias following   -defer wound care to surgery team    #Hypokalemia  -Replete    #HTN  -hypotension in prior admission - started on Midodrine... . but now with hypertension  -STOP midodrine today, monitor BPs     DVT ppx: SCDs to left leg (will avoid right leg as this was the site of the initial DVT)...  re-eval ability to resume Eliquis pending stability of Hb in AM    Case d/w patient's daughter, Bruno, who is also a physician, [phone], all questions answered. She is in agreement with the above    Advanced Care Planning: DNR/DNI   87F nonverbal with dementia, hx colon ca with resection, recently hospitalization for sacral decub requiring debridement as well as newly dx bilateral PE, comes to hospital today from [facility] for abnormally low hemoglobin in setting of melena    #Acute blood loss anemia secondary to GI bleed  #Acute GI bleed, presumed upper  -IV PPI bid - can switch to PO in AM assuming CBC remains stable  -GI consult: Dr. Tavares to follow  -s/p 1U PRBC with great response  -CBC in AM  -Advance to clear liquids today (usually on minced diet, thin liquids)  -Hold ASA indefinitely and Eliquis at least until tomorrow pending repeat CBC in AM     #History of bilateral PE (diagnosed in Dec 2022)  #History of right femoral DVT  -Eliquis currently on hold - would resume in AM if H/H stable... but would consider to resume at reduced dose of 2.5 mg bid, even though this is "suboptimal" - based on benefits/risks discussion with patient's daughter (who is in favor of reduced dose)  -No class I indication for ASA idenified.... was not on this prior to rehab (per daughter) ... no hx CAD or CVA or PAD .... should not resume this at all, because of increased bleeding risk with Eliquis    #COVID19 infection  -Diagnosed at [facility] .... completed Paxlovid.... date of first positive test unclear.... support care    #Dementia without behavioral disturbance  #Nonverbal due to above  -Supportive care    #Sacral wound with wound vac in place  -Dr. Liza Iglesias following   -defer wound care to surgery team    #Hypokalemia  -Replete    #HTN  -hypotension in prior admission - started on Midodrine... . but now with hypertension  -STOP midodrine today, monitor BPs     DVT ppx: SCDs to left leg (will avoid right leg as this was the site of the initial DVT)...  re-eval ability to resume Eliquis pending stability of Hb in AM    Case d/w patient's daughter, Bruno, who is also a physician, [phone], all questions answered. She is in agreement with the above    Advanced Care Planning: DNR/DNI

## 2023-01-23 LAB
ANION GAP SERPL CALC-SCNC: 8 MMOL/L — SIGNIFICANT CHANGE UP (ref 5–17)
BUN SERPL-MCNC: 12 MG/DL — SIGNIFICANT CHANGE UP (ref 7–23)
CALCIUM SERPL-MCNC: 7.8 MG/DL — LOW (ref 8.4–10.5)
CHLORIDE SERPL-SCNC: 103 MMOL/L — SIGNIFICANT CHANGE UP (ref 96–108)
CO2 SERPL-SCNC: 24 MMOL/L — SIGNIFICANT CHANGE UP (ref 22–31)
CREAT SERPL-MCNC: 0.91 MG/DL — SIGNIFICANT CHANGE UP (ref 0.5–1.3)
EGFR: 61 ML/MIN/1.73M2 — SIGNIFICANT CHANGE UP
GLUCOSE SERPL-MCNC: 98 MG/DL — SIGNIFICANT CHANGE UP (ref 70–99)
HCT VFR BLD CALC: 33.3 % — LOW (ref 34.5–45)
HGB BLD-MCNC: 10.8 G/DL — LOW (ref 11.5–15.5)
MCHC RBC-ENTMCNC: 30.5 PG — SIGNIFICANT CHANGE UP (ref 27–34)
MCHC RBC-ENTMCNC: 32.4 GM/DL — SIGNIFICANT CHANGE UP (ref 32–36)
MCV RBC AUTO: 94.1 FL — SIGNIFICANT CHANGE UP (ref 80–100)
NRBC # BLD: 0 /100 WBCS — SIGNIFICANT CHANGE UP (ref 0–0)
PLATELET # BLD AUTO: 472 K/UL — HIGH (ref 150–400)
POTASSIUM SERPL-MCNC: 3.7 MMOL/L — SIGNIFICANT CHANGE UP (ref 3.5–5.3)
POTASSIUM SERPL-SCNC: 3.7 MMOL/L — SIGNIFICANT CHANGE UP (ref 3.5–5.3)
RBC # BLD: 3.54 M/UL — LOW (ref 3.8–5.2)
RBC # FLD: 16.9 % — HIGH (ref 10.3–14.5)
SARS-COV-2 RNA SPEC QL NAA+PROBE: DETECTED
SODIUM SERPL-SCNC: 135 MMOL/L — SIGNIFICANT CHANGE UP (ref 135–145)
WBC # BLD: 5.96 K/UL — SIGNIFICANT CHANGE UP (ref 3.8–10.5)
WBC # FLD AUTO: 5.96 K/UL — SIGNIFICANT CHANGE UP (ref 3.8–10.5)

## 2023-01-23 PROCEDURE — 99233 SBSQ HOSP IP/OBS HIGH 50: CPT

## 2023-01-23 PROCEDURE — 99233 SBSQ HOSP IP/OBS HIGH 50: CPT | Mod: FS

## 2023-01-23 RX ORDER — APIXABAN 2.5 MG/1
2.5 TABLET, FILM COATED ORAL
Refills: 0 | Status: DISCONTINUED | OUTPATIENT
Start: 2023-01-23 | End: 2023-01-25

## 2023-01-23 RX ORDER — PANTOPRAZOLE SODIUM 20 MG/1
40 TABLET, DELAYED RELEASE ORAL
Refills: 0 | Status: DISCONTINUED | OUTPATIENT
Start: 2023-01-23 | End: 2023-01-25

## 2023-01-23 RX ORDER — ENOXAPARIN SODIUM 100 MG/ML
50 INJECTION SUBCUTANEOUS EVERY 12 HOURS
Refills: 0 | Status: DISCONTINUED | OUTPATIENT
Start: 2023-01-23 | End: 2023-01-23

## 2023-01-23 RX ADMIN — PANTOPRAZOLE SODIUM 40 MILLIGRAM(S): 20 TABLET, DELAYED RELEASE ORAL at 06:26

## 2023-01-23 RX ADMIN — BRIMONIDINE TARTRATE 1 DROP(S): 2 SOLUTION/ DROPS OPHTHALMIC at 17:37

## 2023-01-23 RX ADMIN — BRIMONIDINE TARTRATE 1 DROP(S): 2 SOLUTION/ DROPS OPHTHALMIC at 06:26

## 2023-01-23 RX ADMIN — APIXABAN 2.5 MILLIGRAM(S): 2.5 TABLET, FILM COATED ORAL at 18:27

## 2023-01-23 RX ADMIN — POLYETHYLENE GLYCOL 3350 17 GRAM(S): 17 POWDER, FOR SOLUTION ORAL at 17:36

## 2023-01-23 RX ADMIN — DORZOLAMIDE HYDROCHLORIDE TIMOLOL MALEATE 1 DROP(S): 20; 5 SOLUTION/ DROPS OPHTHALMIC at 17:37

## 2023-01-23 RX ADMIN — ENOXAPARIN SODIUM 50 MILLIGRAM(S): 100 INJECTION SUBCUTANEOUS at 17:36

## 2023-01-23 RX ADMIN — DORZOLAMIDE HYDROCHLORIDE TIMOLOL MALEATE 1 DROP(S): 20; 5 SOLUTION/ DROPS OPHTHALMIC at 06:26

## 2023-01-23 RX ADMIN — PANTOPRAZOLE SODIUM 40 MILLIGRAM(S): 20 TABLET, DELAYED RELEASE ORAL at 17:37

## 2023-01-23 NOTE — PROGRESS NOTE ADULT - PROBLEM SELECTOR PLAN 1
Monitor stool  Monitor H/H  Keep active type and screen  Transfuse < 7 or symptomatic  Continue PPI  Continue elqiuis for PE  Advance diet as tolerated

## 2023-01-23 NOTE — PATIENT PROFILE ADULT - FUNCTIONAL ASSESSMENT - BASIC MOBILITY 6.
1-calculated by average/Not able to assess (calculate score using [facility] averaging method)

## 2023-01-23 NOTE — DIETITIAN INITIAL EVALUATION ADULT - PERTINENT MEDS FT
MEDICATIONS  (STANDING):  brimonidine 0.2% Ophthalmic Solution 1 Drop(s) Both EYES every 12 hours  dorzolamide 2%/timolol 0.5% Ophthalmic Solution 1 Drop(s) Both EYES every 12 hours  pantoprazole    Tablet 40 milliGRAM(s) Oral two times a day  polyethylene glycol 3350 17 Gram(s) Oral two times a day    MEDICATIONS  (PRN):  acetaminophen     Tablet .. 650 milliGRAM(s) Oral every 6 hours PRN Temp greater or equal to 38C (100.4F), Mild Pain (1 - 3)  aluminum hydroxide/magnesium hydroxide/simethicone Suspension 30 milliLiter(s) Oral every 4 hours PRN Dyspepsia  melatonin 3 milliGRAM(s) Oral at bedtime PRN Insomnia  ondansetron Injectable 4 milliGRAM(s) IV Push every 8 hours PRN Nausea and/or Vomiting

## 2023-01-23 NOTE — DIETITIAN NUTRITION RISK NOTIFICATION - TREATMENT: THE FOLLOWING DIET HAS BEEN RECOMMENDED
Diet, Clear Liquid:   Supplement Feeding Modality:  Oral  Ensure Clear Cans or Servings Per Day:  1       Frequency:  Three Times a day (01-23-23 @ 12:57) [Pending Verification By Attending]  Diet, Clear Liquid (01-22-23 @ 12:29) [Active]

## 2023-01-23 NOTE — DIETITIAN INITIAL EVALUATION ADULT - ADD RECOMMEND
1. Glucerna 8oz PO TID (Provides 660kcal-30grams of Protein) to optimize nutritional status  2. Advance diet as tolerated per medical team  3. Monitor daily PO intakes, weights, and BM's

## 2023-01-23 NOTE — DIETITIAN INITIAL EVALUATION ADULT - OTHER INFO
87F with dementia, colon cancer hx of resection, recent hospitalization for bilateral PE / gluteal abscess / sacral decub debridement, returns to hospital for abnormal hemoglobin, fecal occult positive, in need for blood transfusion. + Melena in diaper. Of note, patient dx with COVID at [facility] (date unknown) and is on her final day of paxlovid. Noted DNR documented.    Patient on clear liquid diet at this time due to acute blood loss anemia 2/2 GI bleed. Consuming 26-75% of clear liquid diet per nursing flow sheets.  Per RN, patient with non-bloody BM yesterday.  (1-22-23). +1 edema noted at right hand. Recommend Ensure Clear 8oz PO TID (Provides 720kcal & 24grams of Protein) to optimize nutritional status.

## 2023-01-23 NOTE — DIETITIAN INITIAL EVALUATION ADULT - PERTINENT LABORATORY DATA
01-23    135  |  103  |  12  ----------------------------<  98  3.7   |  24  |  0.91    Ca    7.8<L>      23 Jan 2023 06:47  Phos  3.2     01-22  Mg     1.8     01-22

## 2023-01-24 LAB
ANION GAP SERPL CALC-SCNC: 8 MMOL/L — SIGNIFICANT CHANGE UP (ref 5–17)
BUN SERPL-MCNC: 12 MG/DL — SIGNIFICANT CHANGE UP (ref 7–23)
CALCIUM SERPL-MCNC: 7.7 MG/DL — LOW (ref 8.4–10.5)
CHLORIDE SERPL-SCNC: 105 MMOL/L — SIGNIFICANT CHANGE UP (ref 96–108)
CO2 SERPL-SCNC: 26 MMOL/L — SIGNIFICANT CHANGE UP (ref 22–31)
CREAT SERPL-MCNC: 0.8 MG/DL — SIGNIFICANT CHANGE UP (ref 0.5–1.3)
EGFR: 72 ML/MIN/1.73M2 — SIGNIFICANT CHANGE UP
GLUCOSE SERPL-MCNC: 99 MG/DL — SIGNIFICANT CHANGE UP (ref 70–99)
HCT VFR BLD CALC: 29 % — LOW (ref 34.5–45)
HCT VFR BLD CALC: 32.2 % — LOW (ref 34.5–45)
HGB BLD-MCNC: 10.1 G/DL — LOW (ref 11.5–15.5)
HGB BLD-MCNC: 9.4 G/DL — LOW (ref 11.5–15.5)
POTASSIUM SERPL-MCNC: 3.5 MMOL/L — SIGNIFICANT CHANGE UP (ref 3.5–5.3)
POTASSIUM SERPL-SCNC: 3.5 MMOL/L — SIGNIFICANT CHANGE UP (ref 3.5–5.3)
SODIUM SERPL-SCNC: 139 MMOL/L — SIGNIFICANT CHANGE UP (ref 135–145)

## 2023-01-24 PROCEDURE — 99233 SBSQ HOSP IP/OBS HIGH 50: CPT | Mod: FS

## 2023-01-24 PROCEDURE — 99232 SBSQ HOSP IP/OBS MODERATE 35: CPT

## 2023-01-24 RX ADMIN — POLYETHYLENE GLYCOL 3350 17 GRAM(S): 17 POWDER, FOR SOLUTION ORAL at 06:46

## 2023-01-24 RX ADMIN — PANTOPRAZOLE SODIUM 40 MILLIGRAM(S): 20 TABLET, DELAYED RELEASE ORAL at 17:06

## 2023-01-24 RX ADMIN — PANTOPRAZOLE SODIUM 40 MILLIGRAM(S): 20 TABLET, DELAYED RELEASE ORAL at 06:49

## 2023-01-24 RX ADMIN — BRIMONIDINE TARTRATE 1 DROP(S): 2 SOLUTION/ DROPS OPHTHALMIC at 06:47

## 2023-01-24 RX ADMIN — DORZOLAMIDE HYDROCHLORIDE TIMOLOL MALEATE 1 DROP(S): 20; 5 SOLUTION/ DROPS OPHTHALMIC at 17:07

## 2023-01-24 RX ADMIN — POLYETHYLENE GLYCOL 3350 17 GRAM(S): 17 POWDER, FOR SOLUTION ORAL at 17:06

## 2023-01-24 RX ADMIN — APIXABAN 2.5 MILLIGRAM(S): 2.5 TABLET, FILM COATED ORAL at 17:06

## 2023-01-24 RX ADMIN — DORZOLAMIDE HYDROCHLORIDE TIMOLOL MALEATE 1 DROP(S): 20; 5 SOLUTION/ DROPS OPHTHALMIC at 06:46

## 2023-01-24 RX ADMIN — APIXABAN 2.5 MILLIGRAM(S): 2.5 TABLET, FILM COATED ORAL at 06:47

## 2023-01-24 RX ADMIN — BRIMONIDINE TARTRATE 1 DROP(S): 2 SOLUTION/ DROPS OPHTHALMIC at 17:07

## 2023-01-24 NOTE — PROGRESS NOTE ADULT - PROBLEM SELECTOR PLAN 1
Monitor stool  Monitor H/H  Keep active type and screen  Transfuse < 7 or symptomatic  Continue PPI  Continue elqiuis for PE  Advance diet as tolerated  May d/c from GI standpoint Monitor stool  Monitor H/H  Keep active type and screen  Transfuse < 7 or symptomatic  Continue PPI  Continue eliquis for PE  Advance diet as tolerated  May d/c from GI standpoint

## 2023-01-24 NOTE — PROGRESS NOTE ADULT - NUTRITIONAL ASSESSMENT
This patient has been assessed with a concern for Malnutrition and has been determined to have a diagnosis/diagnoses of Moderate protein-calorie malnutrition.    This patient is being managed with:   Diet Minced and Moist-  Entered: Jan 23 2023  4:32PM    
This patient has been assessed with a concern for Malnutrition and has been determined to have a diagnosis/diagnoses of Moderate protein-calorie malnutrition.    This patient is being managed with:   Diet Clear Liquid-  Supplement Feeding Modality:  Oral  Ensure Clear Cans or Servings Per Day:  1       Frequency:  Three Times a day  Entered: Jan 23 2023 12:57PM    Diet Clear Liquid-  Entered: Jan 22 2023 12:29PM    The following pending diet order is being considered for treatment of Moderate protein-calorie malnutrition:null
This patient has been assessed with a concern for Malnutrition and has been determined to have a diagnosis/diagnoses of Moderate protein-calorie malnutrition.    This patient is being managed with:   Diet Minced and Moist-  Entered: Jan 23 2023  4:32PM    
This patient has been assessed with a concern for Malnutrition and has been determined to have a diagnosis/diagnoses of Moderate protein-calorie malnutrition.    This patient is being managed with:   Diet Clear Liquid-  Supplement Feeding Modality:  Oral  Ensure Clear Cans or Servings Per Day:  1       Frequency:  Three Times a day  Entered: Jan 23 2023 12:57PM    Diet Clear Liquid-  Entered: Jan 22 2023 12:29PM    The following pending diet order is being considered for treatment of Moderate protein-calorie malnutrition:null

## 2023-01-24 NOTE — PROGRESS NOTE ADULT - SUBJECTIVE AND OBJECTIVE BOX
INTERVAL HPI/OVERNIGHT EVENTS:  HPI:    88 y/o female admitted with COVID. Patient seen and examined.    MEDICATIONS  (STANDING):  apixaban 2.5 milliGRAM(s) Oral two times a day  brimonidine 0.2% Ophthalmic Solution 1 Drop(s) Both EYES every 12 hours  dorzolamide 2%/timolol 0.5% Ophthalmic Solution 1 Drop(s) Both EYES every 12 hours  pantoprazole    Tablet 40 milliGRAM(s) Oral two times a day  polyethylene glycol 3350 17 Gram(s) Oral two times a day    MEDICATIONS  (PRN):  acetaminophen     Tablet .. 650 milliGRAM(s) Oral every 6 hours PRN Temp greater or equal to 38C (100.4F), Mild Pain (1 - 3)  aluminum hydroxide/magnesium hydroxide/simethicone Suspension 30 milliLiter(s) Oral every 4 hours PRN Dyspepsia  melatonin 3 milliGRAM(s) Oral at bedtime PRN Insomnia  ondansetron Injectable 4 milliGRAM(s) IV Push every 8 hours PRN Nausea and/or Vomiting      Allergies    No Known Allergies    Intolerances        PAST MEDICAL & SURGICAL HISTORY:  Colon cancer      Dementia      S/P colon resection      PHYSICAL EXAM:   Vital Signs:  Vital Signs Last 24 Hrs  T(C): 36.7 (24 Jan 2023 09:55), Max: 36.7 (24 Jan 2023 09:55)  T(F): 98 (24 Jan 2023 09:55), Max: 98 (24 Jan 2023 09:55)  HR: 70 (24 Jan 2023 09:55) (69 - 74)  BP: 130/60 (24 Jan 2023 09:55) (122/59 - 146/67)  BP(mean): --  RR: 18 (24 Jan 2023 09:55) (18 - 18)  SpO2: 100% (24 Jan 2023 09:55) (99% - 100%)    Parameters below as of 24 Jan 2023 09:55  Patient On (Oxygen Delivery Method): room air      Daily Height in cm: 157.48 (24 Jan 2023 13:18)    Daily I&O's Summary    23 Jan 2023 07:01  -  24 Jan 2023 07:00  --------------------------------------------------------  IN: 120 mL / OUT: 0 mL / NET: 120 mL    24 Jan 2023 07:01  -  24 Jan 2023 14:55  --------------------------------------------------------  IN: 560 mL / OUT: 0 mL / NET: 560 mL        GENERAL:  Appears stated age,   HEENT:  NC/AT,  conjunctivae clear and pink,  CHEST:  Full & symmetric excursion, no increased effort, breath sounds clear  HEART:  Regular rhythm, S1, S2, no murmur  ABDOMEN:  Soft, non-tender, non-distended, normoactive bowel sounds,    EXTEREMITIES:  no edema  SKIN:  No rash/warm/dry  NEURO:  Alert      LABS:                        10.1   x     )-----------( x        ( 24 Jan 2023 14:16 )             32.2     01-24    139  |  105  |  12  ----------------------------<  99  3.5   |  26  |  0.80    Ca    7.7<L>      24 Jan 2023 07:00          amylase   lipase  RADIOLOGY & ADDITIONAL TESTS:  
Medicine Progress Note    Patient is a 87y old  Female who presents with a chief complaint of Abnormal labs (23 Jan 2023 15:39)      SUBJECTIVE / OVERNIGHT EVENTS:  seen and examined  Chart reviewed  No overnight events  non-verbal  h/h stable  no further melena    ADDITIONAL REVIEW OF SYSTEMS:  unable to obtain      MEDICATIONS  (STANDING):  brimonidine 0.2% Ophthalmic Solution 1 Drop(s) Both EYES every 12 hours  dorzolamide 2%/timolol 0.5% Ophthalmic Solution 1 Drop(s) Both EYES every 12 hours  pantoprazole    Tablet 40 milliGRAM(s) Oral two times a day  polyethylene glycol 3350 17 Gram(s) Oral two times a day    MEDICATIONS  (PRN):  acetaminophen     Tablet .. 650 milliGRAM(s) Oral every 6 hours PRN Temp greater or equal to 38C (100.4F), Mild Pain (1 - 3)  aluminum hydroxide/magnesium hydroxide/simethicone Suspension 30 milliLiter(s) Oral every 4 hours PRN Dyspepsia  melatonin 3 milliGRAM(s) Oral at bedtime PRN Insomnia  ondansetron Injectable 4 milliGRAM(s) IV Push every 8 hours PRN Nausea and/or Vomiting    CAPILLARY BLOOD GLUCOSE        I&O's Summary    23 Jan 2023 07:01  -  23 Jan 2023 16:27  --------------------------------------------------------  IN: 120 mL / OUT: 0 mL / NET: 120 mL        PHYSICAL EXAM:  Vital Signs Last 24 Hrs  T(C): 36.1 (23 Jan 2023 15:59), Max: 36.8 (23 Jan 2023 06:45)  T(F): 97 (23 Jan 2023 15:59), Max: 98.2 (23 Jan 2023 06:45)  HR: 69 (23 Jan 2023 15:59) (59 - 69)  BP: 146/67 (23 Jan 2023 15:59) (137/70 - 152/65)  BP(mean): --  RR: 18 (23 Jan 2023 15:59) (16 - 18)  SpO2: 99% (23 Jan 2023 15:59) (97% - 99%)    Parameters below as of 23 Jan 2023 15:59  Patient On (Oxygen Delivery Method): room air      GENERAL: Not in distress. Alert    HEENT: AT/NC. clear conjuctiva, MMM.   no pallor or icterus  CARDIOVASCULAR: RRR S1, S2. SM+. no rubs/gallop  LUNGS: BLAE+, no rales, no wheezing, no rhonchi.    ABDOMEN: ND. Soft,  NT, no guarding / rebound / rigidity. BS normoactive. No CVA tenderness.    BACK: No spine tenderness.  EXTREMITIES: no edema. no leg or calf TP.  SKIN: no rash. sacral decub on wound vac. with serosanguinous fluid  NEUROLOGIC: Alert, awake, non-verbal. not following commands.     PSYCHIATRIC: Calm.  No agitation.      LABS:                        10.8   5.96  )-----------( 472      ( 23 Jan 2023 06:47 )             33.3     01-23    135  |  103  |  12  ----------------------------<  98  3.7   |  24  |  0.91    Ca    7.8<L>      23 Jan 2023 06:47  Phos  3.2     01-22  Mg     1.8     01-22                COVID-19 PCR: Detected (21 Jan 2023 11:30)  COVID-19 PCR: NotDetec (04 Jan 2023 15:00)  SARS-CoV-2: NotDetec (24 Dec 2022 11:47)      RADIOLOGY & ADDITIONAL TESTS:  Imaging from Last 24 Hours:    Electrocardiogram/QTc Interval:    COORDINATION OF CARE:  Care Discussed with Consultants/Other Providers:  
Physical Exam  Mallampati: II  TM Distance: >3 FB  Neck ROM: Limited  cardiovascular exam normal  abdominal exam normal  Patient has:  Upper dentures      Legend: C=Chipped  M=Missing  L=Loose    Anesthesia Plan  ASA Status: 2  Anesthesia Type: MAC  Induction: Intravenous  Reviewed: Lab Results, NPO Status, Medications, Past Med History, Allergies, Problem List, Patient Summary and EKG  The proposed anesthetic plan, including its risks and benefits, have been discussed with the Patient - along with the risks and benefits of alternatives.  Questions were encouraged and answered and the patient and/or representative agrees to proceed.      Anesthesia ROS/Med Hx    Overall Review:  Pts. EKG was reviewed     Pulmonary Review:    Negative for pulmonary    Neuro/Psych Review:  Neuro/Psych Comments: Hx brain tumor s/p craniotomy  Negative for all neuro/psych ROS    Cardiovascular Review:  Cardiovascular ROS notes: EKG SB  Pt. positive for hypertension    GI/HEPATIC/RENAL Review:    Pt. negative for GI/Hepatic/Renal ROS    End/Other Review:    Pt. positive for arthritis    
HPI:  Unable to obtain history from patient due to nonverbal state. All hx obtained from chart review and ED.    87F with dementia, colon cancer hx of resection, recent hospitalization for bilateral PE / gluteal abscess / sacral decub debridement, returns to hospital for abnormal hemoglobin, fecal occult positive, in need for blood transfusion. + Melena in diaper. Of note, patient dx with COVID at [facility] (date unknown) and is on her final day of paxlovid. Noted DNR documented. (21 Jan 2023 14:08)    Patient has been medically rx d and now discharge planning is being done for return to [facility].  Patient is being treated with a home wound vac at [facility].  Surgery was asked to come and evaluate wound. .  Saundra , wound care nurse and myself removed wound vac.    Sacral decub was inspected and also seen by Dr Liza Iglesias ( who placed original wound vac).   Sacral decub does have granulation tissue and appears clean.   There was a very small area superiorly that needed some debridement.    An adaptic dressing was placed with saline soaked dominguez and covered with allevyn dressing,.     Patient to have wound vac reapplied when discharged to [facility] .  
Medicine Progress Note    Patient is a 87y old  Female who presents with a chief complaint of Abnormal labs (24 Jan 2023 15:05)      SUBJECTIVE / OVERNIGHT EVENTS:  non-verbal    ADDITIONAL REVIEW OF SYSTEMS:  unable ot obtain    MEDICATIONS  (STANDING):  apixaban 2.5 milliGRAM(s) Oral two times a day  brimonidine 0.2% Ophthalmic Solution 1 Drop(s) Both EYES every 12 hours  dorzolamide 2%/timolol 0.5% Ophthalmic Solution 1 Drop(s) Both EYES every 12 hours  pantoprazole    Tablet 40 milliGRAM(s) Oral two times a day  polyethylene glycol 3350 17 Gram(s) Oral two times a day    MEDICATIONS  (PRN):  acetaminophen     Tablet .. 650 milliGRAM(s) Oral every 6 hours PRN Temp greater or equal to 38C (100.4F), Mild Pain (1 - 3)  aluminum hydroxide/magnesium hydroxide/simethicone Suspension 30 milliLiter(s) Oral every 4 hours PRN Dyspepsia  melatonin 3 milliGRAM(s) Oral at bedtime PRN Insomnia  ondansetron Injectable 4 milliGRAM(s) IV Push every 8 hours PRN Nausea and/or Vomiting    CAPILLARY BLOOD GLUCOSE        I&O's Summary    23 Jan 2023 07:01  -  24 Jan 2023 07:00  --------------------------------------------------------  IN: 120 mL / OUT: 0 mL / NET: 120 mL    24 Jan 2023 07:01  -  24 Jan 2023 16:20  --------------------------------------------------------  IN: 560 mL / OUT: 0 mL / NET: 560 mL        PHYSICAL EXAM:  Vital Signs Last 24 Hrs  T(C): 36.7 (24 Jan 2023 09:55), Max: 36.7 (24 Jan 2023 09:55)  T(F): 98 (24 Jan 2023 09:55), Max: 98 (24 Jan 2023 09:55)  HR: 70 (24 Jan 2023 09:55) (70 - 74)  BP: 130/60 (24 Jan 2023 09:55) (122/59 - 130/60)  BP(mean): --  RR: 18 (24 Jan 2023 09:55) (18 - 18)  SpO2: 100% (24 Jan 2023 09:55) (100% - 100%)    Parameters below as of 24 Jan 2023 09:55  Patient On (Oxygen Delivery Method): room air    GENERAL: Not in distress. Alert    HEENT: AT/NC. clear conjuctiva, MMM.   no pallor or icterus  CARDIOVASCULAR: RRR S1, S2. SM+. no rubs/gallop  LUNGS: BLAE+, no rales, no wheezing, no rhonchi.    ABDOMEN: ND. Soft,  NT, no guarding / rebound / rigidity. BS normoactive. No CVA tenderness.    BACK: No spine tenderness.  EXTREMITIES: no edema. no leg or calf TP.  SKIN: no rash. sacral decub on wound vac. with serosanguinous fluid  NEUROLOGIC: Alert, awake, non-verbal. not following commands.     PSYCHIATRIC: Calm.  No agitation.      LABS:                        10.1   x     )-----------( x        ( 24 Jan 2023 14:16 )             32.2     01-24    139  |  105  |  12  ----------------------------<  99  3.5   |  26  |  0.80    Ca    7.7<L>      24 Jan 2023 07:00                COVID-19 PCR: Detected (23 Jan 2023 21:30)  COVID-19 PCR: Detected (21 Jan 2023 11:30)  COVID-19 PCR: NotDetec (04 Jan 2023 15:00)  SARS-CoV-2: NotDetec (24 Dec 2022 11:47)      RADIOLOGY & ADDITIONAL TESTS:  Imaging from Last 24 Hours:    Electrocardiogram/QTc Interval:    COORDINATION OF CARE:  Care Discussed with Consultants/Other Providers:  
Patient is a 87y old  Female who presents with a chief complaint of Abnormal labs (21 Jan 2023 23:22)    Patient seen and examined at bedside. No overnight events reported. Does not partake in hx due to dementia    ALLERGIES:  No Known Allergies    MEDICATIONS  (STANDING):  brimonidine 0.2% Ophthalmic Solution 1 Drop(s) Both EYES every 12 hours  dorzolamide 2%/timolol 0.5% Ophthalmic Solution 1 Drop(s) Both EYES every 12 hours  midodrine 5 milliGRAM(s) Oral every 8 hours  pantoprazole  Injectable 40 milliGRAM(s) IV Push two times a day  polyethylene glycol 3350 17 Gram(s) Oral two times a day  potassium chloride    Tablet ER 40 milliEquivalent(s) Oral every 4 hours  sodium chloride 0.9%. 1000 milliLiter(s) (60 mL/Hr) IV Continuous <Continuous>    MEDICATIONS  (PRN):  acetaminophen     Tablet .. 650 milliGRAM(s) Oral every 6 hours PRN Temp greater or equal to 38C (100.4F), Mild Pain (1 - 3)  aluminum hydroxide/magnesium hydroxide/simethicone Suspension 30 milliLiter(s) Oral every 4 hours PRN Dyspepsia  melatonin 3 milliGRAM(s) Oral at bedtime PRN Insomnia  ondansetron Injectable 4 milliGRAM(s) IV Push every 8 hours PRN Nausea and/or Vomiting    Vital Signs Last 24 Hrs  T(F): 97.5 (22 Jan 2023 05:30), Max: 97.6 (21 Jan 2023 20:54)  HR: 66 (22 Jan 2023 05:30) (58 - 77)  BP: 176/94 (22 Jan 2023 05:30) (151/78 - 176/94)  RR: 18 (22 Jan 2023 05:30) (16 - 18)  SpO2: 94% (22 Jan 2023 05:30) (94% - 96%)  I&O's Summary    21 Jan 2023 07:01  -  22 Jan 2023 07:00  --------------------------------------------------------  IN: 780 mL / OUT: 0 mL / NET: 780 mL      PHYSICAL EXAM:  General: NAD, minimally verbal although is attempting to speak more today than yesterday, more alert/interactive, A/O x 0  ENT: No gross hearing impairment, no thrush  Neck: Supple, No JVD  Lungs: Clear to auscultation bilaterally limited to poor effort, good air entry, non-labored breathing  Cardio: RRR, S1/S2  Abdomen: Soft, Nontender, Nondistended; Bowel sounds present  Extremities: No calf tenderness, No cyanosis, No pitting edema  Psych: Appropriate mood and affect, poor concentration  Skin: Sacral wound vac in place, right hip wound is dressed    LABS:                        10.1   6.38  )-----------( 429      ( 22 Jan 2023 05:52 )             29.8     01-22    136  |  103  |  13  ----------------------------<  87  3.3   |  25  |  0.63    Ca    7.7      22 Jan 2023 05:52  Phos  3.2     01-22  Mg     1.8     01-22    TPro  5.0  /  Alb  1.4  /  TBili  0.3  /  DBili  x   /  AST  43  /  ALT  32  /  AlkPhos  98  01-21          PT/INR - ( 21 Jan 2023 11:00 )   PT: 17.4 sec;   INR: 1.49 ratio         PTT - ( 21 Jan 2023 11:00 )  PTT:34.5 sec          12-25 Chol 110 mg/dL LDL -- HDL 37 mg/dL Trig 66 mg/dL    COVID-19 PCR: Detected (01-21-23 @ 11:30)  COVID-19 PCR: NotDetec (01-04-23 @ 15:00)      
87 year old female  nonverbal with dementia, hx colon ca with resection, recently hospitalization for sacral decub requiring debridement as well as newly dx bilateral PE,  Admitted to hospital for acute anemia and observed  melena found in incontinence brief  1 U PrBC with appropriate response Hgb 10 today  No documented bleeding    VITAL SIGNS  T(F): 97.5  HR: 66  BP: 176/94  RR: 18  SpO2: 94%      LAB                        10.1   6.38  )-----------( 429      ( 22 Jan 2023 05:52 )             29.8   136  |  103  |  13  ----------------------------<  87  3.3<L>   |  25  |  0.63       PT/INR - ( 21 Jan 2023 11:00 )   PT: 17.4 sec;   INR: 1.49 ratio    PTT - ( 21 Jan 2023 11:00 )  PTT:34.5 sec    DIAGNOSTICS      MEDICATIONS  brimonidine 0.2% Ophthalmic Solution 1 Drop(s) Both EYES every 12 hours  dorzolamide 2%/timolol 0.5% Ophthalmic Solution 1 Drop(s) Both EYES every 12 hours  pantoprazole  Injectable 40 milliGRAM(s) IV Push two times a day  polyethylene glycol 3350 17 Gram(s) Oral two times a day  potassium chloride    Tablet ER 40 milliEquivalent(s) Oral every 4 hours      PHYSICAL EXAM:  General: NAD observed, minimally verbal  ENT: No gross hearing impairment, no thrush  Neck: Supple, No JVD  Lungs: Clear to auscultation bilaterally limited to poor effort, good air entry, non-labored breathing  Cardio: RRR, S1/S2  Abdomen: Soft, Nontender, Nondistended; Bowel sounds present  Extremities: No calf tenderness, No cyanosis, No pitting edema  Psych: Appropriate mood and affect, poor concentration  Skin: Sacral wound vac, R hip with dressing   
INTERVAL HPI/OVERNIGHT EVENTS:  HPI:    88 y/o female admittd with COVID. Patient seen and examined.     MEDICATIONS  (STANDING):  brimonidine 0.2% Ophthalmic Solution 1 Drop(s) Both EYES every 12 hours  dorzolamide 2%/timolol 0.5% Ophthalmic Solution 1 Drop(s) Both EYES every 12 hours  pantoprazole    Tablet 40 milliGRAM(s) Oral two times a day  polyethylene glycol 3350 17 Gram(s) Oral two times a day    MEDICATIONS  (PRN):  acetaminophen     Tablet .. 650 milliGRAM(s) Oral every 6 hours PRN Temp greater or equal to 38C (100.4F), Mild Pain (1 - 3)  aluminum hydroxide/magnesium hydroxide/simethicone Suspension 30 milliLiter(s) Oral every 4 hours PRN Dyspepsia  melatonin 3 milliGRAM(s) Oral at bedtime PRN Insomnia  ondansetron Injectable 4 milliGRAM(s) IV Push every 8 hours PRN Nausea and/or Vomiting      Allergies    No Known Allergies    Intolerances        PAST MEDICAL & SURGICAL HISTORY:  Colon cancer      Dementia      S/P colon resection      PHYSICAL EXAM:   Vital Signs:  Vital Signs Last 24 Hrs  T(C): 36.8 (23 Jan 2023 06:45), Max: 36.8 (23 Jan 2023 06:45)  T(F): 98.2 (23 Jan 2023 06:45), Max: 98.2 (23 Jan 2023 06:45)  HR: 61 (23 Jan 2023 06:45) (59 - 79)  BP: 152/65 (23 Jan 2023 06:45) (137/70 - 152/65)  BP(mean): --  RR: 16 (23 Jan 2023 06:45) (16 - 18)  SpO2: 97% (23 Jan 2023 06:45) (96% - 98%)    Parameters below as of 23 Jan 2023 06:45  Patient On (Oxygen Delivery Method): room air      Daily     Daily I&O's Summary    23 Jan 2023 07:01  -  23 Jan 2023 15:40  --------------------------------------------------------  IN: 120 mL / OUT: 0 mL / NET: 120 mL        GENERAL:  Appears stated age,   HEENT:  NC/AT,  conjunctivae clear and pink  CHEST:  Full & symmetric excursion, no increased effort, breath sounds clear  HEART:  Regular rhythm, S1, S2, no murmur  ABDOMEN:  Soft, non-tender, non-distended, normoactive bowel sounds  EXTEREMITIES:  no edema  SKIN:  No rash/warm/dry  NEURO:  Alert,       LABS:                        10.8   5.96  )-----------( 472      ( 23 Jan 2023 06:47 )             33.3     01-23    135  |  103  |  12  ----------------------------<  98  3.7   |  24  |  0.91    Ca    7.8<L>      23 Jan 2023 06:47  Phos  3.2     01-22  Mg     1.8     01-22          amylase   lipase  RADIOLOGY & ADDITIONAL TESTS:

## 2023-01-24 NOTE — PROGRESS NOTE ADULT - ASSESSMENT
87F nonverbal with dementia, hx colon ca with resection, recently hospitalization for sacral decub requiring debridement as well as newly dx bilateral PE, comes to hospital today from [facility] for abnormally low hemoglobin in setting of melena    #Acute blood loss anemia secondary to GIIB  - h/h stable  - no further bleed/ melena reported  - c/w PPI BID to PO  - GI consult: noted. no intervention planned. advanced die to minced and thin. (usually on minced diet, thin liquids). tolerating  - s/p 1U PRBC with great response  -CBC in AM  -Hold ASA indefinitely  - resumed  Eliquis     #History of bilateral PE (diagnosed in Dec 2022)  #History of right femoral DVT  -Eliquis resumed at lower dose of 2.5 mg BID as per discussion with daughter by . daughter who is a MD also favours lower dose considering risk/benefit/alt.   -No class I indication for ASA idenified.... was not on this prior to rehab (per daughter) ... no hx CAD or CVA or PAD .... should not resume this at all, because of increased bleeding risk with Eliquis    #COVID19 infection  -Diagnosed at [facility] .... completed Paxlovid.... date of first positive test unclear.... support care    #Dementia without behavioral disturbance  #Nonverbal due to above  -Supportive care    #Sacral wound with wound vac in place  -Dr. Liza Iglesias following   -defer wound care to surgery team  - wound vac changed and bedside debridement by surgical and wound care team. will need wound vac on DC to [facility].    #Hypokalemia  -Replete    #HTN  -hypotension in prior admission - started on Midodrine... . but now with hypertension.   - off midodrine, monitor BPs . resume anti-HTN    DVT ppx: eliquis    called  patient's daughter, Bruno, who is also a physician, [phone], left  to call back 1/23 at 5:37 pm . updated  Anil 1/24  Advanced Care Planning: DNR/DNI  Dispo: DC to [facility] tomorrow at 12/30 in h/h stable, needs wound vac for dc

## 2023-01-25 VITALS
OXYGEN SATURATION: 98 % | SYSTOLIC BLOOD PRESSURE: 151 MMHG | RESPIRATION RATE: 16 BRPM | DIASTOLIC BLOOD PRESSURE: 75 MMHG | TEMPERATURE: 98 F | HEART RATE: 73 BPM

## 2023-01-25 LAB
HCT VFR BLD CALC: 33.4 % — LOW (ref 34.5–45)
HGB BLD-MCNC: 10 G/DL — LOW (ref 11.5–15.5)

## 2023-01-25 PROCEDURE — 84100 ASSAY OF PHOSPHORUS: CPT

## 2023-01-25 PROCEDURE — 85025 COMPLETE CBC W/AUTO DIFF WBC: CPT

## 2023-01-25 PROCEDURE — 85027 COMPLETE CBC AUTOMATED: CPT

## 2023-01-25 PROCEDURE — 36415 COLL VENOUS BLD VENIPUNCTURE: CPT

## 2023-01-25 PROCEDURE — 82272 OCCULT BLD FECES 1-3 TESTS: CPT

## 2023-01-25 PROCEDURE — 99285 EMERGENCY DEPT VISIT HI MDM: CPT

## 2023-01-25 PROCEDURE — 85018 HEMOGLOBIN: CPT

## 2023-01-25 PROCEDURE — 86850 RBC ANTIBODY SCREEN: CPT

## 2023-01-25 PROCEDURE — 86900 BLOOD TYPING SEROLOGIC ABO: CPT

## 2023-01-25 PROCEDURE — 83735 ASSAY OF MAGNESIUM: CPT

## 2023-01-25 PROCEDURE — 85014 HEMATOCRIT: CPT

## 2023-01-25 PROCEDURE — 99239 HOSP IP/OBS DSCHRG MGMT >30: CPT

## 2023-01-25 PROCEDURE — 93005 ELECTROCARDIOGRAM TRACING: CPT

## 2023-01-25 PROCEDURE — 97162 PT EVAL MOD COMPLEX 30 MIN: CPT

## 2023-01-25 PROCEDURE — 87635 SARS-COV-2 COVID-19 AMP PRB: CPT

## 2023-01-25 PROCEDURE — 36430 TRANSFUSION BLD/BLD COMPNT: CPT

## 2023-01-25 PROCEDURE — 80048 BASIC METABOLIC PNL TOTAL CA: CPT

## 2023-01-25 PROCEDURE — 71045 X-RAY EXAM CHEST 1 VIEW: CPT

## 2023-01-25 PROCEDURE — 80053 COMPREHEN METABOLIC PANEL: CPT

## 2023-01-25 PROCEDURE — 86923 COMPATIBILITY TEST ELECTRIC: CPT

## 2023-01-25 PROCEDURE — P9016: CPT

## 2023-01-25 PROCEDURE — 96374 THER/PROPH/DIAG INJ IV PUSH: CPT

## 2023-01-25 PROCEDURE — 85610 PROTHROMBIN TIME: CPT

## 2023-01-25 PROCEDURE — 86901 BLOOD TYPING SEROLOGIC RH(D): CPT

## 2023-01-25 PROCEDURE — 85730 THROMBOPLASTIN TIME PARTIAL: CPT

## 2023-01-25 RX ORDER — CHLORHEXIDINE GLUCONATE 213 G/1000ML
1 SOLUTION TOPICAL DAILY
Refills: 0 | Status: DISCONTINUED | OUTPATIENT
Start: 2023-01-25 | End: 2023-01-25

## 2023-01-25 RX ORDER — CHLORHEXIDINE GLUCONATE 213 G/1000ML
1 SOLUTION TOPICAL
Qty: 0 | Refills: 0 | DISCHARGE
Start: 2023-01-25

## 2023-01-25 RX ORDER — NIRMATRELVIR AND RITONAVIR 150-100 MG
0 KIT ORAL
Qty: 0 | Refills: 0 | DISCHARGE

## 2023-01-25 RX ORDER — CEFEPIME 1 G/1
0 INJECTION, POWDER, FOR SOLUTION INTRAMUSCULAR; INTRAVENOUS
Qty: 0 | Refills: 0 | DISCHARGE

## 2023-01-25 RX ORDER — APIXABAN 2.5 MG/1
1 TABLET, FILM COATED ORAL
Qty: 0 | Refills: 0 | DISCHARGE
Start: 2023-01-25

## 2023-01-25 RX ADMIN — BRIMONIDINE TARTRATE 1 DROP(S): 2 SOLUTION/ DROPS OPHTHALMIC at 05:32

## 2023-01-25 RX ADMIN — PANTOPRAZOLE SODIUM 40 MILLIGRAM(S): 20 TABLET, DELAYED RELEASE ORAL at 05:33

## 2023-01-25 RX ADMIN — APIXABAN 2.5 MILLIGRAM(S): 2.5 TABLET, FILM COATED ORAL at 05:33

## 2023-01-25 RX ADMIN — POLYETHYLENE GLYCOL 3350 17 GRAM(S): 17 POWDER, FOR SOLUTION ORAL at 05:32

## 2023-01-25 RX ADMIN — DORZOLAMIDE HYDROCHLORIDE TIMOLOL MALEATE 1 DROP(S): 20; 5 SOLUTION/ DROPS OPHTHALMIC at 05:32

## 2023-01-25 NOTE — DISCHARGE NOTE PROVIDER - NSDCMRMEDTOKEN_GEN_ALL_CORE_FT
aluminum hydroxide-magnesium hydroxide 200 mg-200 mg/5 mL oral suspension: 30 milliliter(s) orally every 4 hours, As needed, Dyspepsia  apixaban 2.5 mg oral tablet: 1 tab(s) orally 2 times a day  brimonidine 0.2% ophthalmic solution: 1 drop(s) to each affected eye every 12 hours  Cosopt PF 2%-0.5% ophthalmic solution: 1 drop(s) to each affected eye every 12 hours  MiraLax oral powder for reconstitution: 17 gram(s) orally 2 times a day  Protonix 40 mg oral delayed release tablet: 1 tab(s) orally once a day (before a meal)  Tylenol 325 mg oral tablet: 2 tab(s) orally every 6 hours, As needed, Temp greater or equal to 38C (100.4F), Mild Pain (1 - 3)   aluminum hydroxide-magnesium hydroxide 200 mg-200 mg/5 mL oral suspension: 30 milliliter(s) orally every 4 hours, As needed, Dyspepsia  apixaban 2.5 mg oral tablet: 1 tab(s) orally 2 times a day  brimonidine 0.2% ophthalmic solution: 1 drop(s) to each affected eye every 12 hours  chlorhexidine 2% topical pad: 1 application topically once a day  Cosopt PF 2%-0.5% ophthalmic solution: 1 drop(s) to each affected eye every 12 hours  MiraLax oral powder for reconstitution: 17 gram(s) orally 2 times a day  Protonix 40 mg oral delayed release tablet: 1 tab(s) orally once a day (before a meal)  Tylenol 325 mg oral tablet: 2 tab(s) orally every 6 hours, As needed, Temp greater or equal to 38C (100.4F), Mild Pain (1 - 3)

## 2023-01-25 NOTE — DISCHARGE NOTE PROVIDER - HOSPITAL COURSE
87F nonverbal with dementia, hx colon ca with resection, recently hospitalization for sacral decub requiring debridement as well as newly dx bilateral PE, comes to hospital today from [facility] for abnormally low hemoglobin in setting of melena    #Acute blood loss anemia secondary to GIIB  - h/h stable  - no further bleed/ melena reported  - c/w PPI BID to PO  - GI consult: noted. no intervention planned. advanced die to minced and thin. (usually on minced diet, thin liquids). tolerating. GI cleared for DC  - s/p 1U PRBC with great response  -Hold ASA indefinitely  - c/w Eliquis     #History of bilateral PE (diagnosed in Dec 2022)  #History of right femoral DVT  -Eliquis resumed at lower dose of 2.5 mg BID as per discussion with daughter by . daughter who is a MD also favours lower dose considering risk/benefit/alt. I   was unable to reach her as she did not answer my phone and I was old to speak to her .     #COVID19 infection  #Dementia without behavioral disturbance  #Nonverbal due to above  - support care    #Sacral wound with wound vac in place  -Dr. Liza Iglesias following   - wound vac changed and bedside debridement by surgical and wound care team. discussed with Surgery. Patient is on wound care inpatient.. will be placed on wound vac in [facility].     #Hypokalemia  -Replete    #HTN  # Hypotension: improved  - off midodrine, monitor BPs . resume anti-HTN    DVT ppx: eliquis    patient's daughter, Bruno, who is also a physician, [phone],  Advanced Care Planning: DNR/DNI  Dispo: stable for DC to [facility]. Bruno's  agreed. unable to communicate with Bruno  Discharging Provider: Shirlene Olson MD. cell: [phone]. call with Qs   87F nonverbal with dementia, hx colon ca with resection, recently hospitalization for sacral decub requiring debridement as well as newly dx bilateral PE, comes to hospital today from [facility] for abnormally low hemoglobin in setting of melena    #Acute blood loss anemia secondary to GIIB  - h/h stable  - no further bleed/ melena reported  - c/w PPI BID to PO  - GI consult: noted. no intervention planned. advanced die to minced and thin. (usually on minced diet, thin liquids). tolerating. GI cleared for DC  - s/p 1U PRBC with great response  -Hold ASA indefinitely  - c/w Eliquis     #History of bilateral PE (diagnosed in Dec 2022)  #History of right femoral DVT  -Eliquis resumed at lower dose of 2.5 mg BID as per discussion with daughter by . daughter who is a MD also favours lower dose considering risk/benefit/alt. I   was unable to reach her as she did not answer my phone and I was old to speak to her .     #COVID19 infection  #Dementia without behavioral disturbance  #Nonverbal due to above  - support care    #Sacral wound with wound vac in place  -Dr. Liza Iglesias following   - wound vac changed and bedside debridement by surgical and wound care team. discussed with Surgery. Patient is on wound care inpatient.. will be placed on wound vac in [facility].     #Hypokalemia  -Replete    #HTN  # Hypotension: improved  - off midodrine, monitor BPs . resume anti-HTN    DVT ppx: eliquis    patient's daughter, Bruno, who is also a physician, [phone],  Advanced Care Planning: DNR/DNI  Dispo: stable for DC to [facility]. Bruno's  agreed on 1/24 unable to communicate with Bruno. left  today 1/25 to call back and informing mom is going to [facility] noon time  Discharging Provider: Shirlene Olson MD. cell: [phone]. call with Qs

## 2023-01-25 NOTE — DISCHARGE NOTE NURSING/CASE MANAGEMENT/SOCIAL WORK - NSDCPEFALRISK_GEN_ALL_CORE
For information on Fall & Injury Prevention, visit: [email or web address] OR  [email or web address] OR  https://www.cdc.gov/steadi/patient.html

## 2023-01-25 NOTE — DISCHARGE NOTE NURSING/CASE MANAGEMENT/SOCIAL WORK - PATIENT PORTAL LINK FT
You can access the FollowMyHealth Patient Portal offered by [facility] by registering at the following website: [email or web address]. By joining [facility]’s FollowMyHealth portal, you will also be able to view your health information using other applications (apps) compatible with our system.

## 2023-01-25 NOTE — DISCHARGE NOTE PROVIDER - DETAILS OF MALNUTRITION DIAGNOSIS/DIAGNOSES
This patient has been assessed with a concern for Malnutrition and was treated during this hospitalization for the following Nutrition diagnosis/diagnoses:     -  01/23/2023: Moderate protein-calorie malnutrition

## 2023-01-25 NOTE — DISCHARGE NOTE PROVIDER - NSDCCPCAREPLAN_GEN_ALL_CORE_FT
PRINCIPAL DISCHARGE DIAGNOSIS  Diagnosis: Anemia due to acute blood loss  Assessment and Plan of Treatment: bleedign stopped. hemoglbin stable. continue protonix. see GI in 2 weeks. monitor for bleeding. we stopped your aspirin and reduced eliquis dose from 5 mg 2.5 mg twice daily, after discussion with GI and your daughter Bruno. watch for clot/Stroke. seek immmediate medical attention if you have unusal headaches, limb weakness or numbness, leg pain, chest pain, shortness of breath, or any other concerning symptoms.      SECONDARY DISCHARGE DIAGNOSES  Diagnosis: Melena  Assessment and Plan of Treatment: resolved    Diagnosis: Hypotension  Assessment and Plan of Treatment: Blood pressure improved. off midodrin. get blood pressure checked by primary doctor in 1-2 days.    Diagnosis: DVT, lower extremity  Assessment and Plan of Treatment: take eliquis 2.5 mg BID.    Diagnosis: Sacral decubitus ulcer, stage IV  Assessment and Plan of Treatment: wound vac was chnaged by surgical team with small amount of bedside debridgement, wound vac needs to be placed in rehab. follow up wound care team in rehab.

## 2023-01-25 NOTE — DISCHARGE NOTE PROVIDER - NSDCFUADDINST_GEN_ALL_CORE_FT
please bring all DC papers and medications to all health care providers. Return to ER if symptoms recur and any new concerning symptoms.

## 2023-02-06 NOTE — CHART NOTE - NSCHARTNOTEFT_GEN_A_CORE
On Jan. 24, patient was seen by myself and Saundra the wound care nurse.   The Wound vac was removed , sacral decub inspected and cleaned.   Wet dressings applied and covered with Abdominal pads and tegaderm.   There was no debridement done at this time.    Patient to have wound vac reapplied by wound care once   admitted back to [facility]

## 2023-02-23 NOTE — DISCHARGE NOTE PROVIDER - DISCHARGE DIET
Minced and Moist Diet Tetracycline Counseling: Patient counseled regarding possible photosensitivity and increased risk for sunburn.  Patient instructed to avoid sunlight, if possible.  When exposed to sunlight, patients should wear protective clothing, sunglasses, and sunscreen.  The patient was instructed to call the office immediately if the following severe adverse effects occur:  hearing changes, easy bruising/bleeding, severe headache, or vision changes.  The patient verbalized understanding of the proper use and possible adverse effects of tetracycline.  All of the patient's questions and concerns were addressed. Patient understands to avoid pregnancy while on therapy due to potential birth defects.
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
## 2024-02-01 NOTE — PROGRESS NOTE ADULT - SUBJECTIVE AND OBJECTIVE BOX
Awake, alert and interactive.  Appears to be pain free, tolerating PO when fed.  Voiding with assistance, BM documented within past 24 hours.    Vital Signs Last 24 Hrs  T(C): 36.4 (31 Dec 2022 05:35), Max: 36.6 (30 Dec 2022 20:44)  T(F): 97.5 (31 Dec 2022 05:35), Max: 97.8 (30 Dec 2022 20:44)  HR: 60 (31 Dec 2022 05:35) (60 - 69)  BP: 134/57 (31 Dec 2022 05:35) (134/57 - 150/74)  RR: 18 (31 Dec 2022 05:35) (18 - 18)  SpO2: 100% RA (31 Dec 2022 05:35) (100% - 100%)    Labs                        8.2    6.84  )-----------( 163      ( 31 Dec 2022 07:20 )             25.3     139  |  107  |  23  ----------------------------<  109<H>  3.5   |  24  |  1.14    Ca    7.7<L>      31 Dec 2022 07:20  Phos  3.1     12-31  Mg     2.0     12-31    Current Medications  apixaban 5 milliGRAM(s) Oral two times a day  aspirin  chewable 81 milliGRAM(s) Oral daily  brimonidine 0.2% Ophthalmic Solution 1 Drop(s) Right EYE every 12 hours  dorzolamide 2%/timolol 0.5% Ophthalmic Solution 1 Drop(s) Right EYE every 12 hours  midodrine 5 milliGRAM(s) Oral every 8 hours  pantoprazole    Tablet 40 milliGRAM(s) Oral before breakfast  piperacillin/tazobactam IVPB.. 3.375 Gram(s) IV Intermittent every 8 hours  polyethylene glycol 3350 17 Gram(s) Oral two times a day  senna 2 Tablet(s) Oral at bedtime  acetaminophen     Tablet .. 650 milliGRAM(s) Oral every 6 hours PRN Temp greater or equal to 38C (100.4F), Mild Pain (1 - 3)      Physical Exam  Gen:  WN/WD Female resting in bed, NAD  ENT:  NC/AT, no JVD noted  Thorax:  Symmetric, no retractions  Lung:  CTA b/l  CV:  S1, S2. RRR  Abd:  Soft, NT/ND.  BS normoactive, no masses to palp  Extrem:  No C/C/E, lower extremities contracted  Neuro:  No gross motor/sensory deficits  Psych:  Alert and oriented x3, calm and cooperative md pinzon

## 2024-02-21 NOTE — ED PROVIDER NOTE - UNABLE TO OBTAIN
Discharge Instructions following Sedation or Anesthesia:  You have  received  a sedative/anesthetic therefore, you should not consume any alcoholic beverages for minimum of 12 hours.  Do not drive or operate machinery for 24 hours.  Do not sign legal documents for 24 hours.  Dizziness, drowsiness, and unsteadiness may occur.  Rest when need to.  Increase diet as tolerated.  Keep up on fluids if diet allows.      General Instructions:  Do not take a tub bath for 72 hours after procedure (this includes hot tubs and swimming pools).  You may shower, but avoid hot water to injection site.   Avoid strenuous activity TODAY especially if you experience dizziness.   Remove band-aid the next day.  Wash off any residual iodine   Do not use heat, heating pad, or any other heating device over the injection site for 3 days after the procedure.  If you experience pain after your procedure, you may continue with your current pain medication as prescribed.  (DO NOT INCREASE YOUR PAIN MEDICATION WITHOUT TALKING TO DOCTOR)  Soreness and pain at injection site is common, may use ice to reduce soreness.    Call [facility] at [phone] if you experience:   Fever, chills or temperature over 100    Vomiting, Headache, persistent stiff neck, nausea, blurred vision   Difficulty in urinating or unable to urinate with 8 hours   Increase in weakness, numbness or loss of function   Increased redness, swelling or drainage at the injection site      Dementia
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
## (undated) DEVICE — DRAPE LIGHT HANDLE COVER (BLUE)

## (undated) DEVICE — PREP BETADINE KIT

## (undated) DEVICE — GLV 8 PROTEXIS (WHITE)

## (undated) DEVICE — GLV 7.5 PROTEXIS (WHITE)

## (undated) DEVICE — DRSG MASTISOL

## (undated) DEVICE — DRAPE 3/4 SHEET W REINFORCEMENT 56X77"

## (undated) DEVICE — PACKING GAUZE IODOFORM 0.5"

## (undated) DEVICE — WARMING BLANKET UPPER ADULT

## (undated) DEVICE — DRSG VAC GRANUFOAM MEDIUM (BLACK)

## (undated) DEVICE — GOWN LG

## (undated) DEVICE — DRSG XEROFORM 1 X 8"

## (undated) DEVICE — WARMING BLANKET FULL ADULT

## (undated) DEVICE — POSITIONER STRAP ARMBOARD VELCRO TS-30

## (undated) DEVICE — DRAIN RESERVOIR FOR JACKSON PRATT 100CC CARDINAL

## (undated) DEVICE — POSITIONER FOAM EGG CRATE ULNAR 2PCS (PINK)

## (undated) DEVICE — SOL IRR POUR NS 0.9% 500ML

## (undated) DEVICE — CANISTER KCI 500ML GEL SENSA TRAC

## (undated) DEVICE — DRAPE 1/2 SHEET 40X57"

## (undated) DEVICE — DRSG VAC GRANUFOAM LARGE (BLACK)

## (undated) DEVICE — DRSG TELFA 2 X 3

## (undated) DEVICE — ZIMMER PULSAVAC PLUS FAN KIT

## (undated) DEVICE — SOL IRR POUR H2O 250ML

## (undated) DEVICE — STRYKER INTERPULSE HANDPIECE W IRR SUCTION TUBE

## (undated) DEVICE — DRSG COMBINE 5X9"

## (undated) DEVICE — DRSG ACE BANDAGE 6"

## (undated) DEVICE — MARKING PEN DEVON X-FINE TIP W RULER

## (undated) DEVICE — PACK MAJOR ABDOMINAL SUPINE

## (undated) DEVICE — CANISTER W/GEL INFOVAC 1000ML

## (undated) DEVICE — SPECIMEN CONTAINER 100ML

## (undated) DEVICE — DRAPE MAYO STAND 30"

## (undated) DEVICE — ELCTR BOVIE TIP BLADE INSULATED 2.75" EDGE

## (undated) DEVICE — STAPLER SKIN VISI-STAT 35 WIDE

## (undated) DEVICE — DRSG XEROFORM 5 X 9"

## (undated) DEVICE — LAP PAD 18 X 18"

## (undated) DEVICE — DRSG KERLIX ROLL 4.5"

## (undated) DEVICE — GLV 8.5 PROTEXIS (WHITE)

## (undated) DEVICE — MEDICATION LABELS W MARKER

## (undated) DEVICE — VENODYNE/SCD SLEEVE CALF MEDIUM

## (undated) DEVICE — GLV 7 PROTEXIS (WHITE)

## (undated) DEVICE — GOWN TRIMAX LG

## (undated) DEVICE — ELCTR STRYKER NEPTUNE SMOKE EVACUATION PENCIL (GREEN)

## (undated) DEVICE — BLADE SCALPEL SAFETYLOCK #15

## (undated) DEVICE — GLV 6.5 PROTEXIS (WHITE)

## (undated) DEVICE — SOL IRR BAG NS 0.9% 3000ML

## (undated) DEVICE — CANISTER W/GEL INFOVAC 500ML

## (undated) DEVICE — DRAIN JACKSON PRATT 7MM FLAT FULL NO TROCAR

## (undated) DEVICE — DRSG VAC GRANUFOAM SMALL (BLACK)

## (undated) DEVICE — DRAPE SPLIT SHEET 77" X 120"

## (undated) DEVICE — MARKING PEN W RULER

## (undated) DEVICE — Device

## (undated) DEVICE — VENODYNE/SCD SLEEVE CALF LARGE

## (undated) DEVICE — ELCTR GROUNDING PAD ADULT COVIDIEN

## (undated) DEVICE — PACK MINOR

## (undated) DEVICE — VISITEC 4X4

## (undated) DEVICE — DRAPE TOWEL BLUE 17" X 24"

## (undated) DEVICE — BLADE SCALPEL SAFETYLOCK #10

## (undated) DEVICE — MARKING PEN WRITESITE PLUS

## (undated) DEVICE — FOLEY TRAY 16FR 5CC LTX UMETER CLOSED

## (undated) DEVICE — POSITIONER FOAM EGGCRATE PADS 20 X 72 X 2"

## (undated) DEVICE — DRAIN JACKSON PRATT 10MM FLAT FULL NO TROCAR

## (undated) DEVICE — TUBING SUCTION 20FT

## (undated) DEVICE — WARMING BLANKET LOWER ADULT